# Patient Record
Sex: MALE | Race: WHITE | NOT HISPANIC OR LATINO | Employment: OTHER | URBAN - NONMETROPOLITAN AREA
[De-identification: names, ages, dates, MRNs, and addresses within clinical notes are randomized per-mention and may not be internally consistent; named-entity substitution may affect disease eponyms.]

---

## 2018-01-11 ENCOUNTER — OFFICE VISIT (OUTPATIENT)
Dept: CARDIOLOGY | Facility: CLINIC | Age: 53
End: 2018-01-11

## 2018-01-11 VITALS
OXYGEN SATURATION: 97 % | SYSTOLIC BLOOD PRESSURE: 110 MMHG | HEART RATE: 66 BPM | BODY MASS INDEX: 30.71 KG/M2 | WEIGHT: 247 LBS | DIASTOLIC BLOOD PRESSURE: 75 MMHG | HEIGHT: 75 IN

## 2018-01-11 DIAGNOSIS — R06.02 SHORTNESS OF BREATH: ICD-10-CM

## 2018-01-11 DIAGNOSIS — R07.9 CHEST PAIN, UNSPECIFIED TYPE: ICD-10-CM

## 2018-01-11 DIAGNOSIS — I42.9 CARDIOMYOPATHY, UNSPECIFIED TYPE (HCC): Primary | ICD-10-CM

## 2018-01-11 PROCEDURE — 93289 INTERROG DEVICE EVAL HEART: CPT | Performed by: INTERNAL MEDICINE

## 2018-01-11 PROCEDURE — 99204 OFFICE O/P NEW MOD 45 MIN: CPT | Performed by: PHYSICIAN ASSISTANT

## 2018-01-11 RX ORDER — PANTOPRAZOLE SODIUM 40 MG/1
40 TABLET, DELAYED RELEASE ORAL DAILY
COMMUNITY
Start: 2017-12-03 | End: 2018-06-27 | Stop reason: SDUPTHER

## 2018-01-11 RX ORDER — TRAZODONE HYDROCHLORIDE 100 MG/1
100 TABLET ORAL NIGHTLY
COMMUNITY

## 2018-01-11 RX ORDER — SIMVASTATIN 20 MG
20 TABLET ORAL NIGHTLY
COMMUNITY
Start: 2018-01-07 | End: 2019-01-28 | Stop reason: SDUPTHER

## 2018-01-11 RX ORDER — FLUOXETINE HYDROCHLORIDE 20 MG/1
20 CAPSULE ORAL DAILY
COMMUNITY
End: 2018-02-19 | Stop reason: DRUGHIGH

## 2018-01-11 RX ORDER — DICYCLOMINE HYDROCHLORIDE 10 MG/1
10 CAPSULE ORAL DAILY
COMMUNITY
Start: 2018-01-09 | End: 2018-06-27 | Stop reason: SDUPTHER

## 2018-01-11 RX ORDER — FUROSEMIDE 20 MG/1
20 TABLET ORAL DAILY
COMMUNITY
End: 2018-02-21 | Stop reason: DRUGHIGH

## 2018-01-11 RX ORDER — ALBUTEROL SULFATE 90 UG/1
2 AEROSOL, METERED RESPIRATORY (INHALATION) EVERY 4 HOURS PRN
COMMUNITY
End: 2018-06-27

## 2018-01-11 RX ORDER — RAMIPRIL 5 MG/1
5 CAPSULE ORAL DAILY
COMMUNITY
End: 2018-06-27

## 2018-01-11 RX ORDER — WARFARIN SODIUM 5 MG/1
5 TABLET ORAL
COMMUNITY
Start: 2017-11-06 | End: 2018-11-19 | Stop reason: SDUPTHER

## 2018-01-11 RX ORDER — NITROGLYCERIN 0.4 MG/1
TABLET SUBLINGUAL
Qty: 100 TABLET | Refills: 11 | Status: SHIPPED | OUTPATIENT
Start: 2018-01-11

## 2018-01-11 RX ORDER — BUSPIRONE HYDROCHLORIDE 10 MG/1
10 TABLET ORAL 2 TIMES DAILY
COMMUNITY
End: 2021-01-22

## 2018-01-11 RX ORDER — CARVEDILOL 25 MG/1
25 TABLET ORAL 2 TIMES DAILY WITH MEALS
COMMUNITY
Start: 2018-01-03 | End: 2018-07-03 | Stop reason: DRUGHIGH

## 2018-01-11 RX ORDER — AMIODARONE HYDROCHLORIDE 200 MG/1
200 TABLET ORAL DAILY
Qty: 30 TABLET | Refills: 5 | Status: SHIPPED | OUTPATIENT
Start: 2018-01-11 | End: 2018-02-09 | Stop reason: SDUPTHER

## 2018-01-11 RX ORDER — AMIODARONE HYDROCHLORIDE 200 MG/1
200 TABLET ORAL 3 TIMES DAILY
Qty: 42 TABLET | Refills: 0 | Status: SHIPPED | OUTPATIENT
Start: 2018-01-11 | End: 2018-02-09

## 2018-01-11 NOTE — PROGRESS NOTES
Subjective   Bill Umana is a 52 y.o. male     Chief Complaint   Patient presents with   • Atrial Fibrillation     presents as a new patient   • Palpitations   • Shortness of Breath       HPI    Problem list  1.  History of nonischemic dilated cardiomyopathy status post AICD implant ([employer])   1.1 recent interrogation July 11, 2018 demonstrates 4 episodes of nonsustained ventricular tachycardia and one episode of ventricular tachycardia converted with ATP without defibrillation  2.  Chronic systolic heart failure  3.  Atrial fibrillation on Coumadin therapy  4.  Dyslipidemia  5.  GERD    Patient is a 52-year-old male that presents today to establish care.  Patient recently moved here from [place] to be with family.  He presents today to establish cardiac care.    Patient describes that he has been feeling episodes of chest pressure and left arm discomfort.  This is been ongoing and intermittent approximately 4 times a week per his report.  It is short-lived and resolves spontaneously but he has noticed this change.  He also has moderate levels of dyspnea at baseline but nothing that has been progressive.  No PND orthopnea.  He does sense palpitations.  He has had no dizziness presyncope or syncope.  Otherwise he voices no other complaints      Current Outpatient Prescriptions   Medication Sig Dispense Refill   • albuterol (PROVENTIL HFA;VENTOLIN HFA) 108 (90 Base) MCG/ACT inhaler Inhale 2 puffs Every 4 (Four) Hours As Needed for Wheezing.     • busPIRone (BUSPAR) 10 MG tablet Take 10 mg by mouth 2 (Two) Times a Day.     • carvedilol (COREG) 25 MG tablet Take 25 mg by mouth Daily.     • dicyclomine (BENTYL) 10 MG capsule Take 10 mg by mouth.     • FLUoxetine (PROzac) 20 MG capsule Take 20 mg by mouth Daily.     • FLUOXETINE HCL PO Take 40 mg by mouth Daily.     • Fluticasone Furoate-Vilanterol (BREO ELLIPTA IN) Inhale.     • furosemide (LASIX) 20 MG tablet Take 20 mg by mouth Daily.     • pantoprazole  (PROTONIX) 40 MG EC tablet      • ramipril (ALTACE) 5 MG capsule Take 5 mg by mouth Daily.     • simvastatin (ZOCOR) 20 MG tablet      • traZODone (DESYREL) 100 MG tablet Take 100 mg by mouth 3 (Three) Times a Day.     • warfarin (COUMADIN) 5 MG tablet      • amiodarone (PACERONE) 200 MG tablet Take 1 tablet by mouth 3 (Three) Times a Day. 42 tablet 0   • amiodarone (PACERONE) 200 MG tablet Take 1 tablet by mouth Daily. 30 tablet 5   • nitroglycerin (NITROSTAT) 0.4 MG SL tablet 1 under the tongue as needed for angina, may repeat q5mins for up three doses 100 tablet 11     No current facility-administered medications for this visit.        Review of patient's allergies indicates no known allergies.    Past Medical History:   Diagnosis Date   • Atrial fibrillation    • Hypertension    • ICD (implantable cardioverter-defibrillator) in place        Social History     Social History   • Marital status:      Spouse name: N/A   • Number of children: N/A   • Years of education: N/A     Occupational History   • Not on file.     Social History Main Topics   • Smoking status: Never Smoker   • Smokeless tobacco: Never Used   • Alcohol use No   • Drug use: No   • Sexual activity: Defer     Other Topics Concern   • Not on file     Social History Narrative   • No narrative on file         History reviewed. No pertinent family history.    Review of Systems   Constitutional: Positive for fatigue.   HENT: Negative for sinus pressure.    Eyes: Positive for visual disturbance ( wears glasses).   Respiratory: Positive for shortness of breath.    Cardiovascular: Positive for chest pain, palpitations and leg swelling (ankle swelling).   Gastrointestinal: Negative for constipation and diarrhea.   Endocrine: Negative.    Genitourinary: Negative for difficulty urinating.   Musculoskeletal: Positive for myalgias (RLS and toes turning colors ).   Skin: Negative.    Allergic/Immunologic: Negative.  Negative for environmental allergies.  "  Neurological: Positive for headaches.   Hematological: Bruises/bleeds easily.   Psychiatric/Behavioral: The patient is nervous/anxious.        Objective   Vitals:    01/11/18 1251   BP: 110/75   BP Location: Left arm   Patient Position: Sitting   Pulse: 66   SpO2: 97%   Weight: 112 kg (247 lb)   Height: 190.5 cm (75\")      /75 (BP Location: Left arm, Patient Position: Sitting)  Pulse 66  Ht 190.5 cm (75\")  Wt 112 kg (247 lb)  SpO2 97%  BMI 30.87 kg/m2    Lab Results (most recent)     None          Physical Exam   Constitutional: He is oriented to person, place, and time. He appears well-developed and well-nourished. No distress.   HENT:   Head: Normocephalic and atraumatic.   Eyes: EOM are normal. Pupils are equal, round, and reactive to light.   Neck: No JVD present.   Cardiovascular: Normal rate, regular rhythm and normal heart sounds.  Exam reveals no gallop and no friction rub.    No murmur heard.  Pulmonary/Chest: Effort normal and breath sounds normal. No respiratory distress. He has no wheezes. He has no rales. He exhibits no tenderness.   Musculoskeletal: Normal range of motion. He exhibits no edema.   Neurological: He is alert and oriented to person, place, and time. No cranial nerve deficit.   Skin: Skin is warm and dry. No rash noted. No erythema. No pallor.   Psychiatric: He has a normal mood and affect. His behavior is normal.   Nursing note and vitals reviewed.      Procedure   Procedures         Assessment/Plan     Problems Addressed this Visit        Cardiovascular and Mediastinum    Cardiomyopathy - Primary    Relevant Medications    carvedilol (COREG) 25 MG tablet    nitroglycerin (NITROSTAT) 0.4 MG SL tablet    Other Relevant Orders    Stress Test With Myocardial Perfusion One Day    Adult Transthoracic Echo Complete W/ Cont if Necessary Per Protocol       Respiratory    Shortness of breath    Relevant Orders    Stress Test With Myocardial Perfusion One Day    Adult Transthoracic Echo " Complete W/ Cont if Necessary Per Protocol       Nervous and Auditory    Chest pain    Relevant Orders    Stress Test With Myocardial Perfusion One Day    Adult Transthoracic Echo Complete W/ Cont if Necessary Per Protocol                Recommendation  1.  Patient with chest discomfort and history of nonobstructive disease.  Would like to risk stratify at this time we'll schedule for Lexiscan stress test.  2.  Would like to obtain an echocardiogram as well because of ventricular tachycardia with chest pain and dyspnea.  We would like to reevaluate LV function.  3.  Because of patient's several runs of ventricular tachycardia, would like to start on amiodarone loading dose for 2 weeks and then 200 mg daily.  4.  We will see him back for follow-up after above testing.  Any chest pain not resolved by nitroglycerin, he will go to the ER.  He will follow-up with primary as scheduled.  Follow-up with our office as scheduled         Electronically signed by:

## 2018-01-30 ENCOUNTER — TELEPHONE (OUTPATIENT)
Dept: CARDIOLOGY | Facility: CLINIC | Age: 53
End: 2018-01-30

## 2018-01-30 ENCOUNTER — CLINICAL SUPPORT (OUTPATIENT)
Dept: CARDIOLOGY | Facility: CLINIC | Age: 53
End: 2018-01-30

## 2018-01-30 DIAGNOSIS — I42.9 CARDIOMYOPATHY, UNSPECIFIED TYPE (HCC): Primary | ICD-10-CM

## 2018-01-30 DIAGNOSIS — I47.29 NSVT (NONSUSTAINED VENTRICULAR TACHYCARDIA) (HCC): Primary | ICD-10-CM

## 2018-01-30 DIAGNOSIS — I47.29 NSVT (NONSUSTAINED VENTRICULAR TACHYCARDIA) (HCC): ICD-10-CM

## 2018-01-30 DIAGNOSIS — R00.0 TACHYCARDIA: Primary | ICD-10-CM

## 2018-01-30 PROCEDURE — 93289 INTERROG DEVICE EVAL HEART: CPT | Performed by: INTERNAL MEDICINE

## 2018-01-30 RX ORDER — DIGOXIN 125 MCG
125 TABLET ORAL DAILY
Qty: 30 TABLET | Refills: 5 | Status: SHIPPED | OUTPATIENT
Start: 2018-01-30 | End: 2018-03-05

## 2018-01-30 NOTE — TELEPHONE ENCOUNTER
PATIENT STATES DESPITE BEING ON THE AMIODARONE, WITH ACTIVITY PALPS ARE WORSE AND STILL HAS AT REST, AND HIS FINGER TIPS BECOME NUMB. VERBAL ORDER PER POLLO DURAN, PAC REFER TO [facility] DX: NSVT PER DEFIB. INTERROGATION, HAVE DEVICE INTERROGATED AGAIN, AND CONTINUE AMIODARONE AS PRESECRIBED. PATIENT FINE WITH HERE OR AIME. APPT. WHICHEVER IS SOONER. RIGOBERTO WITH [facility] COMING TODAY AT 2:15 TO INTERROGATE DEVICE, PATIENT AWARE AND TO CONTINUE AMIOD. PH,LPN

## 2018-01-30 NOTE — TELEPHONE ENCOUNTER
PATIENT HERE FOR DEFIB. INTERROGATION, CHECKED BY RIGOBERTO WITH [employer]. INTERROGATION DISCUSSED BY HER WITH POLLO DURAN, PAC. VERBAL ORDER PER HIM TO SEND IN DIGOXIN 0.125 MG PO DAILY # 30 WITH 5 REFILLS IN TO PHARMACY AND HAVE PATIENT CALL THE OFFICE IN 1 WEEK WITH SX CHECK AND V/S'S. PATIENT AWARE. PH,LPN

## 2018-01-30 NOTE — TELEPHONE ENCOUNTER
----- Message from Jewels Canales LPN sent at 1/30/2018 11:02 AM EST -----   Patient states he was recently started on a new medication for palpitations. He states he is still having these and would like to speak with a nurse.

## 2018-02-05 ENCOUNTER — HOSPITAL ENCOUNTER (OUTPATIENT)
Dept: CARDIOLOGY | Facility: HOSPITAL | Age: 53
Discharge: HOME | End: 2018-02-05

## 2018-02-05 ENCOUNTER — APPOINTMENT (OUTPATIENT)
Dept: CARDIOLOGY | Facility: HOSPITAL | Age: 53
End: 2018-02-05

## 2018-02-05 ENCOUNTER — OUTSIDE FACILITY SERVICE (OUTPATIENT)
Dept: CARDIOLOGY | Facility: CLINIC | Age: 53
End: 2018-02-05

## 2018-02-05 LAB
MAXIMAL PREDICTED HEART RATE: 167 BPM
MAXIMAL PREDICTED HEART RATE: 167 BPM
STRESS TARGET HR: 142 BPM
STRESS TARGET HR: 142 BPM

## 2018-02-05 PROCEDURE — 78452 HT MUSCLE IMAGE SPECT MULT: CPT

## 2018-02-05 PROCEDURE — A9500 TC99M SESTAMIBI: HCPCS | Performed by: INTERNAL MEDICINE

## 2018-02-05 PROCEDURE — 93017 CV STRESS TEST TRACING ONLY: CPT

## 2018-02-05 PROCEDURE — 25010000002 REGADENOSON 0.4 MG/5ML SOLUTION: Performed by: INTERNAL MEDICINE

## 2018-02-05 PROCEDURE — 93306 TTE W/DOPPLER COMPLETE: CPT

## 2018-02-05 PROCEDURE — 0 TECHNETIUM SESTAMIBI: Performed by: INTERNAL MEDICINE

## 2018-02-05 RX ADMIN — TECHNETIUM TC 99M SESTAMIBI 1 DOSE: 1 INJECTION INTRAVENOUS at 08:30

## 2018-02-05 RX ADMIN — REGADENOSON 0.4 MG: 0.08 INJECTION, SOLUTION INTRAVENOUS at 08:30

## 2018-02-06 PROCEDURE — 78452 HT MUSCLE IMAGE SPECT MULT: CPT | Performed by: INTERNAL MEDICINE

## 2018-02-06 PROCEDURE — 93306 TTE W/DOPPLER COMPLETE: CPT | Performed by: INTERNAL MEDICINE

## 2018-02-06 PROCEDURE — 93018 CV STRESS TEST I&R ONLY: CPT | Performed by: INTERNAL MEDICINE

## 2018-02-07 ENCOUNTER — DOCUMENTATION (OUTPATIENT)
Dept: CARDIOLOGY | Facility: CLINIC | Age: 53
End: 2018-02-07

## 2018-02-08 PROCEDURE — 78802 RP LOCLZJ TUM WHBDY 1 D IMG: CPT

## 2018-02-09 DIAGNOSIS — I48.91 ATRIAL FIBRILLATION, UNSPECIFIED TYPE (HCC): Primary | ICD-10-CM

## 2018-02-09 RX ORDER — AMIODARONE HYDROCHLORIDE 200 MG/1
200 TABLET ORAL DAILY
Qty: 90 TABLET | Refills: 3 | OUTPATIENT
Start: 2018-02-09 | End: 2018-03-23 | Stop reason: ALTCHOICE

## 2018-02-19 ENCOUNTER — OFFICE VISIT (OUTPATIENT)
Dept: CARDIOLOGY | Facility: CLINIC | Age: 53
End: 2018-02-19

## 2018-02-19 VITALS
DIASTOLIC BLOOD PRESSURE: 79 MMHG | OXYGEN SATURATION: 98 % | SYSTOLIC BLOOD PRESSURE: 117 MMHG | HEART RATE: 67 BPM | HEIGHT: 75 IN | BODY MASS INDEX: 30.59 KG/M2 | WEIGHT: 246 LBS

## 2018-02-19 DIAGNOSIS — R07.9 CHEST PAIN, UNSPECIFIED TYPE: ICD-10-CM

## 2018-02-19 DIAGNOSIS — R06.02 SHORTNESS OF BREATH: Primary | ICD-10-CM

## 2018-02-19 DIAGNOSIS — I42.0 DILATED CARDIOMYOPATHY (HCC): ICD-10-CM

## 2018-02-19 DIAGNOSIS — I50.20 SYSTOLIC CONGESTIVE HEART FAILURE, UNSPECIFIED CONGESTIVE HEART FAILURE CHRONICITY: ICD-10-CM

## 2018-02-19 PROCEDURE — 99214 OFFICE O/P EST MOD 30 MIN: CPT | Performed by: PHYSICIAN ASSISTANT

## 2018-02-19 RX ORDER — METHOCARBAMOL 750 MG/1
750 TABLET, FILM COATED ORAL 3 TIMES DAILY
Refills: 1 | COMMUNITY
Start: 2018-01-09 | End: 2022-10-24

## 2018-02-19 RX ORDER — IBUPROFEN 800 MG/1
800 TABLET ORAL EVERY 6 HOURS PRN
COMMUNITY
Start: 2018-02-01

## 2018-02-19 RX ORDER — GABAPENTIN 300 MG/1
CAPSULE ORAL
Refills: 2 | COMMUNITY
Start: 2018-01-29

## 2018-02-19 NOTE — PROGRESS NOTES
Problem list     Subjective   Bill Umana is a 52 y.o. male     Chief Complaint   Patient presents with   • Cardiomyopathy     Here for 2-3 week testing f/u   • Atrial Fibrillation   • Hypertension       HPI    Problem list  1.  History of nonischemic dilated cardiomyopathy status post AICD implant ([employer])   1.1 recent interrogation demonstrates 4 episodes of nonsustained ventricular tachycardia and one episode of ventricular tachycardia converted with ATP without defibrillation  1.2 patient on amiodarone with no evidence of ventricular tachycardia  2.  Chronic systolic heart failure  2.1 EF estimated at 35-40% by echocardiogram February 2018  2.2 EF estimated in the low 40s post stress December 2018  3.  Atrial fibrillation on Coumadin therapy  4.  Dyslipidemia  5.  GERD  6.  Mild mitral and tricuspid regurgitation    Patient is a 52-year-old male that presents back for follow-up.  He is here today to review stress test and echocardiogram findings.  Stress test did not demonstrate any evidence of ischemia.  There was questionable inferior infarct but not confirmed.  Post stress EF in the low 40s.  Echocardiogram demonstrated an EF of 35-40% with grade 2 diastolic dysfunction, mild-to-moderate TR, mild MR, and poorly pressures 35-40 mmHg.    Patient feels more shortness of breath since the last office visit.  He describes that he has had a chronic two-pillow orthopnea.  He has increase that to 3 pillows.  He has noticed lower extremity edema.  He has not noted a weight gain.  However he notices more shortness of breath when trying to exert.  He has felt sharp discomfort in his chest that is short-lived and resolving spontaneously.  It is not associated with nausea or diaphoresis or referral of pain.  He does sense palpitations.  No dizziness presyncope or syncope.  He has mild to moderate levels of fatigue but otherwise is feeling well        Outpatient Encounter Prescriptions as of 2/19/2018    Medication Sig Dispense Refill   • albuterol (PROVENTIL HFA;VENTOLIN HFA) 108 (90 Base) MCG/ACT inhaler Inhale 2 puffs Every 4 (Four) Hours As Needed for Wheezing.     • amiodarone (PACERONE) 200 MG tablet Take 1 tablet by mouth Daily. 90 tablet 3   • busPIRone (BUSPAR) 10 MG tablet Take 10 mg by mouth 2 (Two) Times a Day.     • carvedilol (COREG) 25 MG tablet Take 25 mg by mouth Daily.     • dicyclomine (BENTYL) 10 MG capsule Take 10 mg by mouth.     • digoxin (LANOXIN) 125 MCG tablet Take 1 tablet by mouth Daily. 30 tablet 5   • FLUOXETINE HCL PO Take 40 mg by mouth Daily.     • Fluticasone Furoate-Vilanterol (BREO ELLIPTA IN) Inhale.     • furosemide (LASIX) 20 MG tablet Take 20 mg by mouth Daily.     • gabapentin (NEURONTIN) 300 MG capsule 2 (Two) Times a Day.  2   • ibuprofen (ADVIL,MOTRIN) 800 MG tablet prn     • methocarbamol (ROBAXIN) 750 MG tablet 3 (Three) Times a Day.  1   • pantoprazole (PROTONIX) 40 MG EC tablet Take 40 mg by mouth Daily.     • ramipril (ALTACE) 5 MG capsule Take 5 mg by mouth Daily.     • simvastatin (ZOCOR) 20 MG tablet Take 20 mg by mouth Every Night.     • traZODone (DESYREL) 100 MG tablet Take 100 mg by mouth 3 (Three) Times a Day.     • warfarin (COUMADIN) 5 MG tablet Take 5 mg by mouth Daily.     • nitroglycerin (NITROSTAT) 0.4 MG SL tablet 1 under the tongue as needed for angina, may repeat q5mins for up three doses 100 tablet 11   • [DISCONTINUED] FLUoxetine (PROzac) 20 MG capsule Take 20 mg by mouth Daily.       No facility-administered encounter medications on file as of 2/19/2018.        Review of patient's allergies indicates no known allergies.    Past Medical History:   Diagnosis Date   • Atrial fibrillation    • Hypertension    • ICD (implantable cardioverter-defibrillator) in place        Social History     Social History   • Marital status:      Spouse name: N/A   • Number of children: N/A   • Years of education: N/A     Occupational History   • Not on file.  "    Social History Main Topics   • Smoking status: Never Smoker   • Smokeless tobacco: Never Used   • Alcohol use No   • Drug use: No   • Sexual activity: Defer     Other Topics Concern   • Not on file     Social History Narrative       History reviewed. No pertinent family history.    Review of Systems   Constitutional: Positive for fatigue.   HENT:        Scratchy throat   Eyes: Positive for visual disturbance (glasses).   Respiratory: Positive for shortness of breath.    Cardiovascular: Positive for chest pain, palpitations and leg swelling.   Gastrointestinal: Negative.    Endocrine: Negative.    Genitourinary: Negative.    Musculoskeletal: Negative.    Skin: Negative.    Allergic/Immunologic: Negative.    Neurological: Positive for light-headedness.   Hematological: Bruises/bleeds easily (bleed).   Psychiatric/Behavioral: Negative.        Objective   Vitals:    02/19/18 0840   BP: 117/79   BP Location: Left arm   Patient Position: Sitting   Pulse: 67   SpO2: 98%   Weight: 112 kg (246 lb)   Height: 190.5 cm (75\")      /79 (BP Location: Left arm, Patient Position: Sitting)  Pulse 67  Ht 190.5 cm (75\")  Wt 112 kg (246 lb)  SpO2 98%  BMI 30.75 kg/m2    Lab Results (most recent)     None          Physical Exam   Constitutional: He is oriented to person, place, and time. He appears well-developed and well-nourished. No distress.   HENT:   Head: Normocephalic and atraumatic.   Eyes: EOM are normal. Pupils are equal, round, and reactive to light.   Neck: No JVD present.   Cardiovascular: Normal rate, regular rhythm and normal heart sounds.  Exam reveals no gallop and no friction rub.    No murmur heard.  Pulmonary/Chest: Effort normal and breath sounds normal. No respiratory distress. He has no wheezes. He has no rales. He exhibits no tenderness.   Musculoskeletal: Normal range of motion. He exhibits no edema.   Neurological: He is alert and oriented to person, place, and time. No cranial nerve deficit. "   Skin: Skin is warm and dry. No rash noted. No erythema. No pallor.   Psychiatric: He has a normal mood and affect. His behavior is normal.   Nursing note and vitals reviewed.      Procedure   Procedures       Assessment/Plan     Problems Addressed this Visit        Cardiovascular and Mediastinum    Systolic congestive heart failure    Dilated cardiomyopathy       Respiratory    Shortness of breath - Primary       Nervous and Auditory    Chest pain              Recommendation  1.  I feel patient has mildly decompensated congestive heart failure based on patient's history and physical exam findings.  Stress test did not demonstrate any evidence of ischemia.  EF estimated in the low 40s based on stress test and echo cardiac exam findings.  I would like to adjust medications to hopefully improve the patient's symptoms.  I would like to stop ramipril and replace with entresto which would help in regards to patient's energy level but also with diuresis.  I would like to see him back in one to 2 weeks..  He is on maximum dose of carvedilol and at this time I would like to stop digoxin.  He does not have severe cardiomyopathy at this time like to stop at this time.  2.  I feel his chest discomfort shortness of breath and symptoms are likely from the LAD compensated heart failure with no evidence of ischemia on stress testing.  I would like to see him back in one to 2 weeks to reevaluate clinical course and adjust medications.  For any chest pain not resolved by nitroglycerin, he is to go to the ER.  Follow-up primary as scheduled       Electronically signed by:

## 2018-02-21 ENCOUNTER — TELEPHONE (OUTPATIENT)
Dept: CARDIOLOGY | Facility: CLINIC | Age: 53
End: 2018-02-21

## 2018-02-21 DIAGNOSIS — I42.9 CARDIOMYOPATHY, UNSPECIFIED TYPE (HCC): ICD-10-CM

## 2018-02-21 DIAGNOSIS — R07.2 PRECORDIAL PAIN: Primary | ICD-10-CM

## 2018-02-21 DIAGNOSIS — R06.02 SHORTNESS OF BREATH: ICD-10-CM

## 2018-02-21 DIAGNOSIS — R60.9 EDEMA, UNSPECIFIED TYPE: ICD-10-CM

## 2018-02-21 DIAGNOSIS — R94.39 ABNORMAL STRESS TEST: ICD-10-CM

## 2018-02-21 RX ORDER — RANOLAZINE 500 MG/1
500 TABLET, EXTENDED RELEASE ORAL EVERY 12 HOURS SCHEDULED
Qty: 60 TABLET | Refills: 5 | Status: SHIPPED | OUTPATIENT
Start: 2018-02-21 | End: 2018-03-05

## 2018-02-21 RX ORDER — POTASSIUM CHLORIDE 750 MG/1
10 TABLET, FILM COATED, EXTENDED RELEASE ORAL DAILY
Qty: 30 TABLET | Refills: 5 | Status: SHIPPED | OUTPATIENT
Start: 2018-02-21 | End: 2018-09-04 | Stop reason: SDUPTHER

## 2018-02-21 RX ORDER — FUROSEMIDE 40 MG/1
40 TABLET ORAL DAILY
Qty: 30 TABLET | Refills: 5 | Status: SHIPPED | OUTPATIENT
Start: 2018-02-21 | End: 2018-07-30 | Stop reason: SDUPTHER

## 2018-02-21 NOTE — TELEPHONE ENCOUNTER
----- Message from Jewels Canales LPN sent at 2/21/2018  2:08 PM EST -----   Patient states he was instructed to call back and speak with Devaugh[facility]'s nurse with a follow up on how he has been doing.

## 2018-02-21 NOTE — TELEPHONE ENCOUNTER
CALLED PATIENT BACK AND HE IS STILL LIGHTHEADED, DIAPHORETIC ALL THE TIME, CENTER CHEST PRESSURE/DULL PAIN AT REST AND GETS A LOT WORSE WITH ACTIVITY WITH SHARP PAINS THEN THAT COMES AND GOES. DOES HAVE SHORTNESS OF BREATH, PAIN IN MID BACK TO LEFT SIDE, MILD LE EDEMA BY EVENING BUT GOES AWAY BY MORNING. HE DOES HAVE SL NTG. AT HOME. B/P 120/83 H/R 71 AT FAMILY PCP THIS AM. POLLO DURAN, PAC AWARE OF ABOVE, FOLLOWING TELEPHONE ORDERS RECEIVED: START RANEXA 500 MG PO BID # 60 WITH 5 REFILLS, INCREASE LASIX TO 40 MG PO DAILY, START K+ 10 MEQ 1 PO DAILY, BMP IN 1 WEEK, SCHEDULE FOR [facility] DX: CARDIOMYOPATHY, C/P, SOB, ABNL. STRESS TEST, TO GO TO ER FOR WORSENING C/P ETC., CALL OFFICE NEXT WEEK WITH SX CHECK, AND KEEP F/U APPT. ON 3-5-18. ALL NEW ORDERS ABOVE DISCUSSED IN DETAIL WITH PATIENT AND HE VERBALIZED HE UNDERSTOOD. HE REQUESTED 1 WEEK LAB ORDER BE FAXED TO [facility]. ORDER FAXED. ABOVE MEDS CHANGES/ADDITION'S ELECTRO'D IN TO [facility]. PH,LPN

## 2018-03-05 ENCOUNTER — OFFICE VISIT (OUTPATIENT)
Dept: CARDIOLOGY | Facility: CLINIC | Age: 53
End: 2018-03-05

## 2018-03-05 ENCOUNTER — APPOINTMENT (OUTPATIENT)
Dept: LAB | Facility: HOSPITAL | Age: 53
End: 2018-03-05

## 2018-03-05 VITALS
BODY MASS INDEX: 29.89 KG/M2 | DIASTOLIC BLOOD PRESSURE: 80 MMHG | HEART RATE: 63 BPM | SYSTOLIC BLOOD PRESSURE: 122 MMHG | WEIGHT: 240.4 LBS | OXYGEN SATURATION: 97 % | HEIGHT: 75 IN

## 2018-03-05 DIAGNOSIS — Z95.810 AICD (AUTOMATIC CARDIOVERTER/DEFIBRILLATOR) PRESENT: ICD-10-CM

## 2018-03-05 DIAGNOSIS — I48.0 PAROXYSMAL ATRIAL FIBRILLATION (HCC): ICD-10-CM

## 2018-03-05 DIAGNOSIS — E78.5 HYPERLIPIDEMIA, UNSPECIFIED HYPERLIPIDEMIA TYPE: ICD-10-CM

## 2018-03-05 DIAGNOSIS — Z00.00 HEALTHCARE MAINTENANCE: ICD-10-CM

## 2018-03-05 DIAGNOSIS — I42.0 DILATED CARDIOMYOPATHY (HCC): ICD-10-CM

## 2018-03-05 DIAGNOSIS — I10 ESSENTIAL HYPERTENSION: ICD-10-CM

## 2018-03-05 DIAGNOSIS — R06.02 SHORTNESS OF BREATH: ICD-10-CM

## 2018-03-05 DIAGNOSIS — R94.39 ABNORMAL STRESS TEST: ICD-10-CM

## 2018-03-05 DIAGNOSIS — R07.9 CHEST PAIN, UNSPECIFIED TYPE: Primary | ICD-10-CM

## 2018-03-05 PROCEDURE — 93000 ELECTROCARDIOGRAM COMPLETE: CPT | Performed by: NURSE PRACTITIONER

## 2018-03-05 PROCEDURE — 99214 OFFICE O/P EST MOD 30 MIN: CPT | Performed by: NURSE PRACTITIONER

## 2018-03-05 PROCEDURE — 80048 BASIC METABOLIC PNL TOTAL CA: CPT | Performed by: NURSE PRACTITIONER

## 2018-03-05 PROCEDURE — 83735 ASSAY OF MAGNESIUM: CPT | Performed by: NURSE PRACTITIONER

## 2018-03-05 NOTE — PATIENT INSTRUCTIONS
MyPlate from [employer]  The general, healthful diet is based on the 2010 Dietary Guidelines for Americans. The amount of food you need to eat from each food group depends on your age, sex, and level of physical activity and can be individualized by a dietitian. Go to ChooseMyPlate.gov for more information.  What do I need to know about the MyPlate plan?  · Enjoy your food, but eat less.  · Avoid oversized portions.  ¨ ½ of your plate should include fruits and vegetables.  ¨ ¼ of your plate should be grains.  ¨ ¼ of your plate should be protein.  Grains   · Make at least half of your grains whole grains.  · For a 2,000 calorie daily food plan, eat 6 oz every day.  · 1 oz is about 1 slice bread, 1 cup cereal, or ½ cup cooked rice, cereal, or pasta.  Vegetables   · Make half your plate fruits and vegetables.  · For a 2,000 calorie daily food plan, eat 2½ cups every day.  · 1 cup is about 1 cup raw or cooked vegetables or vegetable juice or 2 cups raw leafy greens.  Fruits   · Make half your plate fruits and vegetables.  · For a 2,000 calorie daily food plan, eat 2 cups every day.  · 1 cup is about 1 cup fruit or 100% fruit juice or ½ cup dried fruit.  Protein   · For a 2,000 calorie daily food plan, eat 5½ oz every day.  · 1 oz is about 1 oz meat, poultry, or fish, ¼ cup cooked beans, 1 egg, 1 Tbsp peanut butter, or ½ oz nuts or seeds.  Dairy   · Switch to fat-free or low-fat (1%) milk.  · For a 2,000 calorie daily food plan, eat 3 cups every day.  · 1 cup is about 1 cup milk or yogurt or soy milk (soy beverage), 1½ oz natural cheese, or 2 oz processed cheese.  Fats, Oils, and Empty Calories   · Only small amounts of oils are recommended.  · Empty calories are calories from solid fats or added sugars.  · Compare sodium in foods like soup, bread, and frozen meals. Choose the foods with lower numbers.  · Drink water instead of sugary drinks.  What foods can I eat?  Grains   Whole grains such as whole wheat, quinoa, millet,  and bulgur. Bread, rolls, and pasta made from whole grains. Brown or wild rice. Hot or cold cereals made from whole grains and without added sugar.  Vegetables   All fresh vegetables, especially fresh red, dark green, or orange vegetables. Peas and beans. Low-sodium frozen or canned vegetables prepared without added salt. Low-sodium vegetable juices.  Fruits   All fresh, frozen, and dried fruits. Canned fruit packed in water or fruit juice without added sugar. Fruit juices without added sugar.  Meats and Other Protein Sources   Boiled, baked, or grilled lean meat trimmed of fat. Skinless poultry. Fresh seafood and shellfish. Canned seafood packed in water. Unsalted nuts and unsalted nut butters. Tofu. Dried beans and pea. Eggs.  Dairy   Low-fat or fat-free milk, yogurt, and cheeses.  Sweets and Desserts   Frozen desserts made from low-fat milk.  Fats and Oils   Olive, peanut, and canola oils and margarine. Salad dressing and mayonnaise made from these oils.  Other   Soups and casseroles made from allowed ingredients and without added fat or salt.  The items listed above may not be a complete list of recommended foods or beverages. Contact your dietitian for more options.   What foods are not recommended?  Grains   Sweetened, low-fiber cereals. Packaged baked goods. Snack crackers and chips. Cheese crackers, butter crackers, and biscuits. Frozen waffles, sweet breads, doughnuts, pastries, packaged baking mixes, pancakes, cakes, and cookies.  Vegetables   Regular canned or frozen vegetables or vegetables prepared with salt. Canned tomatoes. Canned tomato sauce. Fried vegetables. Vegetables in cream sauce or cheese sauce.  Fruits   Fruits packed in syrup or made with added sugar.  Meats and Other Protein Sources   Marbled or fatty meats such as ribs. Poultry with skin. Fried meats, poultry, eggs, or fish. Sausages, hot dogs, and deli meats such as pastrami, bologna, or salami.  Dairy   Whole milk, cream, cheeses made  from whole milk, sour cream. Ice cream or yogurt made from whole milk or with added sugar.  Beverages   For adults, no more than one alcoholic drink per day. Regular soft drinks or other sugary beverages. Juice drinks.  Sweets and Desserts   Sugary or fatty desserts, candy, and other sweets.  Fats and Oils   Solid shortening or partially hydrogenated oils. Solid margarine. Margarine that contains trans fats. Butter.  The items listed above may not be a   Coronary Angiogram With Stent  Coronary angiogram with stent placement is a procedure to widen or open a narrow blood vessel of the heart (coronary artery). Arteries may become blocked by cholesterol buildup (plaques) in the lining or wall. When a coronary artery becomes partially blocked, blood flow to that area decreases. This may lead to chest pain or a heart attack (myocardial infarction).  A stent is a small piece of metal that looks like mesh or a spring. Stent placement may be done as treatment for a heart attack or right after a coronary angiogram in which a blocked artery is found.  Let your health care provider know about:  · Any allergies you have.  · All medicines you are taking, including vitamins, herbs, eye drops, creams, and over-the-counter medicines.  · Any problems you or family members have had with anesthetic medicines.  · Any blood disorders you have.  · Any surgeries you have had.  · Any medical conditions you have.  · Whether you are pregnant or may be pregnant.  What are the risks?  Generally, this is a safe procedure. However, problems may occur, including:  · Damage to the heart or its blood vessels.  · A return of blockage.  · Bleeding, infection, or bruising at the insertion site.  · A collection of blood under the skin (hematoma) at the insertion site.  · A blood clot in another part of the body.  · Kidney injury.  · Allergic reaction to the dye or contrast that is used.  · Bleeding into the abdomen (retroperitoneal bleeding).  What  happens before the procedure?  Staying hydrated   Follow instructions from your health care provider about hydration, which may include:  · Up to 2 hours before the procedure - you may continue to drink clear liquids, such as water, clear fruit juice, black coffee, and plain tea.  Eating and drinking restrictions   Follow instructions from your health care provider about eating and drinking, which may include:  · 8 hours before the procedure - stop eating heavy meals or foods such as meat, fried foods, or fatty foods.  · 6 hours before the procedure - stop eating light meals or foods, such as toast or cereal.  · 2 hours before the procedure - stop drinking clear liquids.  Ask your health care provider about:  · Changing or stopping your regular medicines. This is especially important if you are taking diabetes medicines or blood thinners.  · Taking medicines such as ibuprofen. These medicines can thin your blood. Do not take these medicines before your procedure if your health care provider instructs you not to. Generally, aspirin is recommended before a procedure of passing a small, thin tube (catheter) through a blood vessel and into the heart (cardiac catheterization).  What happens during the procedure?  · An IV tube will be inserted into one of your veins.  · You will be given one or more of the following:  ¨ A medicine to help you relax (sedative).  ¨ A medicine to numb the area where the catheter will be inserted into an artery (local anesthetic).  · To reduce your risk of infection:  ¨ Your health care team will wash or sanitize their hands.  ¨ Your skin will be washed with soap.  ¨ Hair may be removed from the area where the catheter will be inserted.  · Using a guide wire, the catheter will be inserted into an artery. The location may be in your groin, in your wrist, or in the fold of your arm (near your elbow).  · A type of X-ray (fluoroscopy) will be used to help guide the catheter to the opening of the  arteries in the heart.  · A dye will be injected into the catheter, and X-rays will be taken. The dye will help to show where any narrowing or blockages are located in the arteries.  · A tiny wire will be guided to the blocked spot, and a balloon will be inflated to make the artery wider.  · The stent will be expanded and will crush the plaques into the wall of the vessel. The stent will hold the area open and improve the blood flow. Most stents have a drug coating to reduce the risk of the stent narrowing over time.  · The artery may be made wider using a drill, laser, or other tools to remove plaques.  · When the blood flow is better, the catheter will be removed. The lining of the artery will grow over the stent, which stays where it was placed.  This procedure may vary among health care providers and hospitals.  What happens after the procedure?  · If the procedure is done through the leg, you will be kept in bed lying flat for about 6 hours. You will be instructed to not bend and not cross your legs.  · The insertion site will be checked frequently.  · The pulse in your foot or wrist will be checked frequently.  · You may have additional blood tests, X-rays, and a test that records the electrical activity of your heart (electrocardiogram, or ECG).  This information is not intended to replace advice given to you by your health care provider. Make sure you discuss any questions you have with your health care provider.  Document Released: 06/23/2004 Document Revised: 08/17/2017 Document Reviewed: 07/23/2017  [employer] Interactive Patient Education © 2017 [employer]  complete list of foods and beverages to avoid. Contact your dietitian for more information.   This information is not intended to replace advice given to you by your health care provider. Make sure you discuss any questions you have with your health care provider.  Document Released: 01/06/2009 Document Revised: 05/25/2017 Document Reviewed:  11/26/2014  [employer] Interactive Patient Education © 2017 [employer]

## 2018-03-05 NOTE — PROGRESS NOTES
Subjective   Bill Umana is a 52 y.o. male     Chief Complaint   Patient presents with   • Results     presents for testing follow up   • Chest Pain   • Shortness of Breath       HPI    Problem list    1.  History of nonischemic dilated cardiomyopathy status post AICD implant ([employer])   1.1 recent interrogation demonstrates 4 episodes of nonsustained ventricular tachycardia and one episode of ventricular tachycardia converted with ATP without defibrillation  1.2 patient on amiodarone with no evidence of ventricular tachycardia  2.  Chronic systolic heart failure  2.1 EF estimated at 35-40% by echocardiogram February 2018  2.2 EF estimated in the low 40s post stress December 2018  2.3 Echo 2/5/18-mild LVH, EF 35-40%, diastolic dysfunction 2, mild MR, mild-to-moderate TR, PA 35-40  3.  Atrial fibrillation on Coumadin therapy  4.  Dyslipidemia  5.  GERD  6.  Mild mitral and tricuspid regurgitation  7.  Chest pain  7.1 stress test 2/5/18-possible inferior infarct, no ischemia, mildly to moderately decreased global LVEF    Patient is a 52-year-old male who presents today for a follow-up.  Patient has left anterior burning and pain that radiates into his left arm that he notices mostly with activity.  He says he will take a nitroglycerin and just 1 and with time and will resolve.  He does get nauseated and short of breath when this occurs.  He says he has palpitations almost every day in the last about 5 minutes.  He says this is very common for him and has not changed.  He does get dizzy with position change but denies any presyncope or syncope.  He denies any orthopnea, PND or edema.  He says that for about the last week and a half he's had increased shortness of breath with activity.  Devaughn had patient call back to advise if he was having any symptoms and he was.  He was advised over the phone that we will proceed with left heart catheter he is here today to have that set up.  He denies any signs of  bleeding or cerebral ischemic events.    Current Outpatient Prescriptions   Medication Sig Dispense Refill   • albuterol (PROVENTIL HFA;VENTOLIN HFA) 108 (90 Base) MCG/ACT inhaler Inhale 2 puffs Every 4 (Four) Hours As Needed for Wheezing.     • amiodarone (PACERONE) 200 MG tablet Take 1 tablet by mouth Daily. 90 tablet 3   • busPIRone (BUSPAR) 10 MG tablet Take 10 mg by mouth 2 (Two) Times a Day.     • carvedilol (COREG) 25 MG tablet Take 25 mg by mouth Daily.     • dicyclomine (BENTYL) 10 MG capsule Take 10 mg by mouth.     • FLUOXETINE HCL PO Take 40 mg by mouth Daily.     • Fluticasone Furoate-Vilanterol (BREO ELLIPTA IN) Inhale.     • furosemide (LASIX) 40 MG tablet Take 1 tablet by mouth Daily. 30 tablet 5   • gabapentin (NEURONTIN) 300 MG capsule 2 (Two) Times a Day.  2   • ibuprofen (ADVIL,MOTRIN) 800 MG tablet prn     • methocarbamol (ROBAXIN) 750 MG tablet 3 (Three) Times a Day.  1   • nitroglycerin (NITROSTAT) 0.4 MG SL tablet 1 under the tongue as needed for angina, may repeat q5mins for up three doses 100 tablet 11   • pantoprazole (PROTONIX) 40 MG EC tablet Take 40 mg by mouth Daily.     • potassium chloride (K-DUR) 10 MEQ CR tablet Take 1 tablet by mouth Daily. 30 tablet 5   • ramipril (ALTACE) 5 MG capsule Take 5 mg by mouth Daily.     • simvastatin (ZOCOR) 20 MG tablet Take 20 mg by mouth Every Night.     • traZODone (DESYREL) 100 MG tablet Take 100 mg by mouth 3 (Three) Times a Day.     • warfarin (COUMADIN) 5 MG tablet Take 5 mg by mouth Daily. PCP follows patient     • aspirin 81 MG tablet Take 1 tablet by mouth Daily. 30 tablet 11     No current facility-administered medications for this visit.        ALLERGIES    Review of patient's allergies indicates no known allergies.    Past Medical History:   Diagnosis Date   • Atrial fibrillation    • Hypertension    • ICD (implantable cardioverter-defibrillator) in place        Social History     Social History   • Marital status:      Spouse  "name: N/A   • Number of children: N/A   • Years of education: N/A     Occupational History   • Not on file.     Social History Main Topics   • Smoking status: Never Smoker   • Smokeless tobacco: Never Used   • Alcohol use No   • Drug use: No   • Sexual activity: Defer     Other Topics Concern   • Not on file     Social History Narrative       Family History   Problem Relation Age of Onset   • No Known Problems Mother    • No Known Problems Father        Review of Systems   Constitutional: Positive for diaphoresis (constantly ) and fatigue.   HENT: Positive for rhinorrhea. Negative for sinus pressure and sneezing.    Eyes: Positive for visual disturbance ( wears glasses).   Respiratory: Positive for cough, shortness of breath (with CP and with activity worse for the past 1 1/2 weeks ) and wheezing. Negative for chest tightness.    Cardiovascular: Positive for chest pain (left anterior burning and pain that radiates into the left arm mostly notices with activity; took nitro; only 1) and palpitations (everyday, common, last about 5 min ). Negative for leg swelling.   Gastrointestinal: Positive for diarrhea and nausea (with CP ). Negative for constipation and vomiting.   Endocrine: Negative.    Genitourinary: Negative for difficulty urinating.   Musculoskeletal: Positive for back pain (upper back ), myalgias and neck pain. Negative for arthralgias.   Skin: Negative.    Allergic/Immunologic: Negative for environmental allergies.   Neurological: Positive for dizziness (with position change ) and headaches ( migraines). Negative for syncope and light-headedness.   Hematological: Does not bruise/bleed easily.   Psychiatric/Behavioral: The patient is nervous/anxious.        Objective   /80 (BP Location: Left arm, Patient Position: Sitting)  Pulse 63  Ht 190.5 cm (75\")  Wt 109 kg (240 lb 6.4 oz)  SpO2 97%  BMI 30.05 kg/m2  Vitals:    03/05/18 0837   BP: 122/80   BP Location: Left arm   Patient Position: Sitting " "  Pulse: 63   SpO2: 97%   Weight: 109 kg (240 lb 6.4 oz)   Height: 190.5 cm (75\")      Lab Results (most recent)     None        Physical Exam   Constitutional: He is oriented to person, place, and time. Vital signs are normal. He appears well-developed and well-nourished. He is active and cooperative.   HENT:   Head: Normocephalic.   Eyes: Lids are normal.   Wears glasses    Neck: Normal carotid pulses, no hepatojugular reflux and no JVD present. Carotid bruit is not present.   Cardiovascular: Normal rate, regular rhythm and normal heart sounds.    Pulses:       Radial pulses are 2+ on the right side, and 2+ on the left side.        Dorsalis pedis pulses are 2+ on the right side, and 2+ on the left side.        Posterior tibial pulses are 2+ on the right side, and 2+ on the left side.   No edema BLE.    Pulmonary/Chest: Effort normal and breath sounds normal.   Abdominal: Normal appearance and bowel sounds are normal.   Neurological: He is alert and oriented to person, place, and time.   Skin: Skin is warm, dry and intact.   Psychiatric: He has a normal mood and affect. His speech is normal and behavior is normal. Judgment and thought content normal. Cognition and memory are normal.       Procedure     ECG 12 Lead  Date/Time: 3/5/2018 1:27 PM  Performed by: ANNA SANCHEZ  Authorized by: ANNA SANCHEZ   Comparison: not compared with previous ECG   Rhythm: sinus bradycardia  Rate: bradycardic  BPM: 54  T wave flattening noted on lead: nonspecific changes   QRS axis: normal  Clinical impression: non-specific ECG                 Assessment/Plan      Diagnosis Plan   1. Chest pain, unspecified type  Cardiac catheterization    aspirin 81 MG tablet   2. Dilated cardiomyopathy  Cardiac catheterization   3. Shortness of breath  Cardiac catheterization   4. Paroxysmal atrial fibrillation  Cardiac catheterization    Magnesium    Magnesium   5. AICD (automatic cardioverter/defibrillator) present  Cardiac catheterization "   6. Hyperlipidemia, unspecified hyperlipidemia type  Cardiac catheterization   7. Essential hypertension  Cardiac catheterization    Basic Metabolic Panel    Magnesium    Basic Metabolic Panel    Magnesium   8. Abnormal stress test  Cardiac catheterization   9. Healthcare maintenance  Basic Metabolic Panel    Magnesium    Basic Metabolic Panel    Magnesium       Return for After testing.       chest pain/cardiomyopathy/shortness of breath/A. fib/hyperlipidemia/hypertension/abnormal stress test-patient will proceed with left heart catheter.  He will start aspirin 81 mg.  He will hold Coumadin for 4 days prior to heart catheter and resume afterwards.  He will get a BMP and magnesium prior to heart catheter.  He will use nitroglycerin when necessary for chest pain no resolution he will go to the ER.  Patient will continue his medication regimen otherwise.  Patient will follow-up after left heart catheter or sooner if any changes.    Needed consider overnight pulse ox in the future due to the fact he has elevated pulmonary pressures.    Patient's BMI is above normal parameters. Follow-up plan includes:  educational material.      Electronically signed by:

## 2018-03-06 LAB
ANION GAP SERPL CALCULATED.3IONS-SCNC: 4.7 MMOL/L (ref 3.6–11.2)
BUN BLD-MCNC: 12 MG/DL (ref 7–21)
BUN/CREAT SERPL: 11.4 (ref 7–25)
CALCIUM SPEC-SCNC: 9.2 MG/DL (ref 7.7–10)
CHLORIDE SERPL-SCNC: 106 MMOL/L (ref 99–112)
CO2 SERPL-SCNC: 28.3 MMOL/L (ref 24.3–31.9)
CREAT BLD-MCNC: 1.05 MG/DL (ref 0.43–1.29)
GFR SERPL CREATININE-BSD FRML MDRD: 74 ML/MIN/1.73
GLUCOSE BLD-MCNC: 103 MG/DL (ref 70–110)
MAGNESIUM SERPL-MCNC: 2.1 MG/DL (ref 1.7–2.6)
OSMOLALITY SERPL CALC.SUM OF ELEC: 277.5 MOSM/KG (ref 273–305)
POTASSIUM BLD-SCNC: 4.5 MMOL/L (ref 3.5–5.3)
SODIUM BLD-SCNC: 139 MMOL/L (ref 135–153)

## 2018-03-15 ENCOUNTER — OUTSIDE FACILITY SERVICE (OUTPATIENT)
Dept: CARDIOLOGY | Facility: CLINIC | Age: 53
End: 2018-03-15

## 2018-03-15 PROCEDURE — 93458 L HRT ARTERY/VENTRICLE ANGIO: CPT | Performed by: INTERNAL MEDICINE

## 2018-03-21 NOTE — PROGRESS NOTES
Electrophysiology Consult     Bill Umana  1965  [phone]      03/23/18    DATE OF ADMISSION: (Not on file)  [facility] CARDIOLOGY    Les Mann MD  [address] [facility] [address]    Chief Complaint   Patient presents with   • Rapid Heart Rate     Problem List:    1. Nonischemic Cardiomyopathy   A. ICD Implant ([employer]) - 2005 - [facility]   B. Generator change 2012   C. LHC 3/15/18: no angiographically significant epicardial coronary disease, EF 40-45%  2. Ventricular Tachycardia   A. Device interrogation demonstrates 4 episodes of nonsustained ventricular tachycardia and one episode of ventricular tachycardia converted with ATP without  Defibrillation 1/2018   B. Initiation on Amiodarone 1/11/18  2.  Chronic systolic heart failure   A. Echocardiogram 2/5/2018: EF 35-40%,  diastolic dysfunction 2, mild MR, mild-to-moderate TR, PA 35-40   B. Stress Test 2/2018: EF low 40s, possible inferior infarct, no ischemia  3.  Atrial fibrillation    A. CHADSVasc = 2  On coumadin since 2016  4.  Dyslipidemia  5. HTN  6.  GERD  7. 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 He was seen in January and noted to have NSVT by device interrogation. He was symptomatic with these episodes with palpitations. He was started on Amiodarone and had a stress test and echocardiogram at that time showing EF 35-40%. Since then, he has been experiencing some chest pain and ended up having a LHC on 3/15/18, which showed essentially normal coronary arteries  and EF 40-45%. Since being on Amiodarone, his episodes have decreased. He still has episodes of tachycardia a couple times per day, that lasts a couple of minutes. He states that it is worse with activity. He is a  for his [employer] and is fairly active. He also has an apparent history of atrial fibrillation and has been on coumadin since 2016. He denies tobacco, ETOH, excessive caffeine. He drinks 2 cups of coffee daily. He has recently located from [place] to [place] in the last year and a half.     No Known Allergies     Cannot display prior to admission medications because the patient has not been admitted in this contact.            Current Outpatient Prescriptions:   •  albuterol (PROVENTIL HFA;VENTOLIN HFA) 108 (90 Base) MCG/ACT inhaler, Inhale 2 puffs Every 4 (Four) Hours As Needed for Wheezing., Disp: , Rfl:   •  amiodarone (PACERONE) 200 MG tablet, Take 1 tablet by mouth Daily., Disp: 90 tablet, Rfl: 3  •  aspirin 81 MG tablet, Take 1 tablet by mouth Daily., Disp: 30 tablet, Rfl: 11  •  busPIRone (BUSPAR) 10 MG tablet, Take 10 mg by mouth 2 (Two) Times a Day., Disp: , Rfl:   •  carvedilol (COREG) 25 MG tablet, Take 25 mg by mouth 2 (Two) Times a Day With Meals., Disp: , Rfl:   •  dicyclomine (BENTYL) 10 MG capsule, Take 10 mg by mouth Daily., Disp: , Rfl:   •  FLUOXETINE HCL PO, Take 40 mg by mouth Daily., Disp: , Rfl:   •  Fluticasone Furoate-Vilanterol (BREO ELLIPTA IN), Inhale., Disp: , Rfl:   •  furosemide (LASIX) 40 MG tablet, Take 1 tablet by mouth Daily., Disp: 30 tablet, Rfl: 5  •  gabapentin (NEURONTIN) 300 MG capsule, 2 (Two) Times a Day., Disp: , Rfl: 2  •  ibuprofen (ADVIL,MOTRIN) 800 MG tablet, prn, Disp: , Rfl:   •  methocarbamol (ROBAXIN) 750 MG tablet, 3 (Three) Times a Day., Disp: , Rfl: 1  •  nitroglycerin (NITROSTAT) 0.4 MG SL tablet, 1 under the tongue as needed for angina, may repeat q5mins for up three doses, Disp: 100 tablet, Rfl: 11  •  pantoprazole (PROTONIX) 40 MG EC  "tablet, Take 40 mg by mouth Daily., Disp: , Rfl:   •  potassium chloride (K-DUR) 10 MEQ CR tablet, Take 1 tablet by mouth Daily., Disp: 30 tablet, Rfl: 5  •  ramipril (ALTACE) 5 MG capsule, Take 5 mg by mouth Daily., Disp: , Rfl:   •  simvastatin (ZOCOR) 20 MG tablet, Take 20 mg by mouth Every Night., Disp: , Rfl:   •  traZODone (DESYREL) 100 MG tablet, Take 100 mg by mouth 3 (Three) Times a Day., Disp: , Rfl:   •  warfarin (COUMADIN) 5 MG tablet, Take 5 mg by mouth Daily. PCP follows patient, Disp: , Rfl:     Social History     Social History   • Marital status:      Social History Main Topics   • Smoking status: Never Smoker   • Smokeless tobacco: Never Used   • Alcohol use No   • Drug use: No   • Sexual activity: Defer     Other Topics Concern   • Not on file       Family History   Problem Relation Age of Onset   • No Known Problems Mother    • No Known Problems Father    Mother: CABG x 3, valve replacement, stents.  at age 84.   Father: \"hole in heart\".  in his 70s of emphysema   Sister: conduction disorder, alive age 56   Grandfather:  suddenly in his 20s    REVIEW OF SYSTEMS:   CONST:  No weight loss, fever, chills, weakness or fatigue.   HEENT:  No visual loss, blurred vision, double vision, yellow sclerae.                   No hearing loss, congestion, sore throat.   SKIN:      No rashes, urticaria, ulcers, sores.     RESP:     No shortness of breath, hemoptysis, cough, sputum.   GI:           No anorexia, nausea, vomiting, diarrhea. No abdominal pain, melena.   :         No burning on urination, hematuria or increased frequency.  ENDO:    No diaphoresis, cold or heat intolerance. No polyuria or polydipsia.   NEURO:  No headache, dizziness, syncope, paralysis, ataxia, or parasthesias.                  No change in bowel or bladder control. No history of CVA/TIA  MUSC:    No muscle, back pain, joint pain or stiffness.   HEME:    No anemia, bleeding, bruising. No history of " "DVT/PE.  PSYCH:  No history of depression, anxiety    Vitals:    03/23/18 1217   BP: 104/76   BP Location: Left arm   Patient Position: Sitting   Pulse: 78   Weight: 108 kg (237 lb)   Height: 190.5 cm (75\")                 Physical Exam:  GEN: Well nourished, Well- developed  No acute distress  HEENT: Normocephalic, Atraumatic, PERRLA, moist mucous membranes  NECK: supple, NO JVD, no thyromegaly, no lymphadenopathy  CARD: S1S2  RRR S3 No S4, gallop, rub  LUNGS: Clear to auscultataion, normal respiratory effort  ABDOMEN: Soft, nontender, normal bowel sounds  EXTREMITIES:No gross deformities,  No clubbing, cyanosis, or edema  SKIN: Warm, dry  NEURO: No focal deficits  PSYCHIATRIC: Normal affect and mood        ECG 12 Lead  Date/Time: 3/23/2018 1:06 PM  Performed by: MICAH KEARNEY  Authorized by: MICAH KEARNEY   Rhythm: sinus rhythm  BPM: 72               ICD Check: normal function, 5.5 years on battery. No AFIB or VT.       ICD-10-CM ICD-9-CM   1. VT (ventricular tachycardia) I47.2 427.1   2. Dilated cardiomyopathy I42.0 425.4   3. Chronic systolic congestive heart failure I50.22 428.22     428.0       Assessment and Plan:   1. VT: Currently on Amiodarone, due to potential side effects in his young age, would stop Amiodarone. After being off for one week, will start Sotalol 80 mg BID and lower Coreg to 12.5 mg BID. He will need an EKG 48 hours after starting Sotalol.   2. Dilated Cardiomyopathy/Chronic systolic CHF - EF 40-45%, Class II symptoms on Coreg and ACE. Normal ICD function  3. HTN - controlled.     Scribed for Micah Kearney MD by Chhaya German PA-C. 3/23/2018  1:08 PM     Micah SIN MD, personally performed the services described in this documentation as scribed by the above named individual in my presence, and it is both accurate and complete.  3/23/2018  1:10 PM    "

## 2018-03-23 ENCOUNTER — CONSULT (OUTPATIENT)
Dept: CARDIOLOGY | Facility: CLINIC | Age: 53
End: 2018-03-23

## 2018-03-23 VITALS
HEART RATE: 78 BPM | BODY MASS INDEX: 29.47 KG/M2 | HEIGHT: 75 IN | SYSTOLIC BLOOD PRESSURE: 104 MMHG | WEIGHT: 237 LBS | DIASTOLIC BLOOD PRESSURE: 76 MMHG

## 2018-03-23 DIAGNOSIS — I47.20 VT (VENTRICULAR TACHYCARDIA) (HCC): Primary | ICD-10-CM

## 2018-03-23 DIAGNOSIS — I50.22 CHRONIC SYSTOLIC CONGESTIVE HEART FAILURE (HCC): ICD-10-CM

## 2018-03-23 DIAGNOSIS — I42.0 DILATED CARDIOMYOPATHY (HCC): ICD-10-CM

## 2018-03-23 PROCEDURE — 99204 OFFICE O/P NEW MOD 45 MIN: CPT | Performed by: INTERNAL MEDICINE

## 2018-03-23 PROCEDURE — 93283 PRGRMG EVAL IMPLANTABLE DFB: CPT | Performed by: INTERNAL MEDICINE

## 2018-03-23 RX ORDER — SOTALOL HYDROCHLORIDE 80 MG/1
80 TABLET ORAL 2 TIMES DAILY
Qty: 60 TABLET | Refills: 5 | Status: SHIPPED | OUTPATIENT
Start: 2018-03-23 | End: 2018-08-31 | Stop reason: SDUPTHER

## 2018-03-29 ENCOUNTER — OFFICE VISIT (OUTPATIENT)
Dept: CARDIOLOGY | Facility: CLINIC | Age: 53
End: 2018-03-29

## 2018-03-29 VITALS
BODY MASS INDEX: 29.19 KG/M2 | SYSTOLIC BLOOD PRESSURE: 106 MMHG | WEIGHT: 234.8 LBS | DIASTOLIC BLOOD PRESSURE: 72 MMHG | HEIGHT: 75 IN | OXYGEN SATURATION: 98 % | HEART RATE: 67 BPM

## 2018-03-29 DIAGNOSIS — R06.02 SHORTNESS OF BREATH: Primary | ICD-10-CM

## 2018-03-29 DIAGNOSIS — I42.9 CARDIOMYOPATHY, UNSPECIFIED TYPE (HCC): ICD-10-CM

## 2018-03-29 DIAGNOSIS — I50.20 SYSTOLIC CONGESTIVE HEART FAILURE, UNSPECIFIED CONGESTIVE HEART FAILURE CHRONICITY: ICD-10-CM

## 2018-03-29 DIAGNOSIS — I48.0 PAROXYSMAL ATRIAL FIBRILLATION (HCC): ICD-10-CM

## 2018-03-29 DIAGNOSIS — I50.9 CONGESTIVE HEART FAILURE, UNSPECIFIED CONGESTIVE HEART FAILURE CHRONICITY, UNSPECIFIED CONGESTIVE HEART FAILURE TYPE: ICD-10-CM

## 2018-03-29 DIAGNOSIS — I42.0 DILATED CARDIOMYOPATHY (HCC): ICD-10-CM

## 2018-03-29 DIAGNOSIS — I47.20 VT (VENTRICULAR TACHYCARDIA) (HCC): ICD-10-CM

## 2018-03-29 DIAGNOSIS — R53.83 FATIGUE, UNSPECIFIED TYPE: ICD-10-CM

## 2018-03-29 DIAGNOSIS — R07.9 CHEST PAIN, UNSPECIFIED TYPE: ICD-10-CM

## 2018-03-29 DIAGNOSIS — I95.9 HYPOTENSION, UNSPECIFIED HYPOTENSION TYPE: ICD-10-CM

## 2018-03-29 PROCEDURE — 99213 OFFICE O/P EST LOW 20 MIN: CPT | Performed by: PHYSICIAN ASSISTANT

## 2018-03-29 NOTE — PROGRESS NOTES
Problem list     Subjective   Bill Umana is a 52 y.o. male     Chief Complaint   Patient presents with   • Congestive Heart Failure     Here for heart cath. f/u   • Cardiomyopathy   • Atrial Fibrillation   • Hyperlipidemia   • Hypertension       HPI    Problem list  1.  History of nonischemic dilated cardiomyopathy status post AICD implant ([employer])   1.1 recent interrogation demonstrates 4 episodes of nonsustained ventricular tachycardia and one episode of ventricular tachycardia converted with ATP without defibrillation  1.2 patient on Sotalol for ventricular tachycardia followed by EP services  2.  Chronic systolic heart failure  2.1 EF estimated at 35-40% by echocardiogram February 2018  2.2 EF estimated in the low 40s post stress December 2018  2.3 EF by ventriculography during catheterization March 2018 at 40-45%  3.  Atrial fibrillation on Coumadin therapy  4.  Dyslipidemia  5.  GERD  6.  Mild mitral and tricuspid regurgitation  7.  Cardiac catheterization March 2018 with normal coronaries and an EF estimated at 4045%    Patient is a 52-year-old male that presents back for follow-up he has done well.  Occasionally he will experience chest pressure but this happens on occasion and usually is experienced with exertion.  He will experience mild shortness of breath when exerting to moderate levels but nothing that has been progressive.  No PND orthopnea.  He doesn't palpitate or dysrhythmic symptoms and otherwise is doing well.  He recently seen in EP services who discontinued the amiodarone and start him on sotalol at this time.      Outpatient Encounter Prescriptions as of 3/29/2018   Medication Sig Dispense Refill   • albuterol (PROVENTIL HFA;VENTOLIN HFA) 108 (90 Base) MCG/ACT inhaler Inhale 2 puffs Every 4 (Four) Hours As Needed for Wheezing.     • aspirin 81 MG tablet Take 1 tablet by mouth Daily. 30 tablet 11   • busPIRone (BUSPAR) 10 MG tablet Take 10 mg by mouth 2 (Two) Times a Day.     •  carvedilol (COREG) 25 MG tablet Take 25 mg by mouth 2 (Two) Times a Day With Meals.     • dicyclomine (BENTYL) 10 MG capsule Take 10 mg by mouth Daily.     • FLUOXETINE HCL PO Take 40 mg by mouth Daily.     • Fluticasone Furoate-Vilanterol (BREO ELLIPTA IN) Inhale.     • furosemide (LASIX) 40 MG tablet Take 1 tablet by mouth Daily. 30 tablet 5   • gabapentin (NEURONTIN) 300 MG capsule 2 (Two) Times a Day.  2   • ibuprofen (ADVIL,MOTRIN) 800 MG tablet prn     • methocarbamol (ROBAXIN) 750 MG tablet 3 (Three) Times a Day.  1   • pantoprazole (PROTONIX) 40 MG EC tablet Take 40 mg by mouth Daily.     • potassium chloride (K-DUR) 10 MEQ CR tablet Take 1 tablet by mouth Daily. 30 tablet 5   • ramipril (ALTACE) 5 MG capsule Take 5 mg by mouth Daily.     • simvastatin (ZOCOR) 20 MG tablet Take 20 mg by mouth Every Night.     • sotalol (BETAPACE) 80 MG tablet Take 1 tablet by mouth 2 (Two) Times a Day. 60 tablet 5   • traZODone (DESYREL) 100 MG tablet Take 100 mg by mouth 3 (Three) Times a Day.     • warfarin (COUMADIN) 5 MG tablet Take 5 mg by mouth Daily. PCP follows.  Patient takes 7.5 mg 3 x week and 5 mg all other day's     • nitroglycerin (NITROSTAT) 0.4 MG SL tablet 1 under the tongue as needed for angina, may repeat q5mins for up three doses 100 tablet 11     No facility-administered encounter medications on file as of 3/29/2018.        Review of patient's allergies indicates no known allergies.    Past Medical History:   Diagnosis Date   • Atrial fibrillation    • Hypertension    • ICD (implantable cardioverter-defibrillator) in place        Social History     Social History   • Marital status:      Spouse name: N/A   • Number of children: N/A   • Years of education: N/A     Occupational History   • Not on file.     Social History Main Topics   • Smoking status: Never Smoker   • Smokeless tobacco: Never Used   • Alcohol use No   • Drug use: No   • Sexual activity: Defer     Other Topics Concern   • Not on  "file     Social History Narrative   • No narrative on file       Family History   Problem Relation Age of Onset   • No Known Problems Mother    • No Known Problems Father        Review of Systems   Constitutional: Positive for fatigue.   HENT: Negative.    Eyes: Positive for visual disturbance (glasses).   Respiratory: Positive for shortness of breath.    Cardiovascular: Positive for chest pain (\"tightness\"). Negative for palpitations and leg swelling.   Gastrointestinal: Negative.    Endocrine: Negative.    Genitourinary: Negative.    Musculoskeletal: Positive for arthralgias and myalgias.   Skin: Negative.    Allergic/Immunologic: Positive for environmental allergies.   Neurological: Negative.    Hematological: Bruises/bleeds easily (bleed).   Psychiatric/Behavioral: Positive for sleep disturbance.       Objective   Vitals:    03/29/18 0905   BP: 106/72   BP Location: Left arm   Patient Position: Sitting   Pulse: 67   SpO2: 98%   Weight: 107 kg (234 lb 12.8 oz)   Height: 190.5 cm (75\")      /72 (BP Location: Left arm, Patient Position: Sitting)   Pulse 67   Ht 190.5 cm (75\")   Wt 107 kg (234 lb 12.8 oz)   SpO2 98%   BMI 29.35 kg/m²     Lab Results (most recent)     None          Physical Exam   Constitutional: He is oriented to person, place, and time. He appears well-developed and well-nourished. No distress.   HENT:   Head: Normocephalic and atraumatic.   Eyes: EOM are normal. Pupils are equal, round, and reactive to light.   Neck: No JVD present.   Cardiovascular: Normal rate, regular rhythm, normal heart sounds and intact distal pulses.  Exam reveals no gallop and no friction rub.    No murmur heard.  Pulmonary/Chest: Effort normal and breath sounds normal. No respiratory distress. He has no wheezes. He has no rales. He exhibits no tenderness.   Musculoskeletal: Normal range of motion. He exhibits no edema.   Neurological: He is alert and oriented to person, place, and time. No cranial nerve " deficit.   Skin: Skin is warm and dry. No rash noted. No erythema. No pallor.   Psychiatric: He has a normal mood and affect. His behavior is normal.       Procedure   Procedures       Assessment/Plan     Problems Addressed this Visit        Cardiovascular and Mediastinum    Systolic congestive heart failure    Dilated cardiomyopathy    Paroxysmal atrial fibrillation    VT (ventricular tachycardia)       Respiratory    Shortness of breath - Primary       Nervous and Auditory    Chest pain      Other Visit Diagnoses    None.             Recommendation  1.  Patient doing well at this time.  We discussed possible therapy in regards to patient's recurrent chest pain but he would like to monitor for now.  He has good coronaries and systolic function is not critically depressed.  He will monitor his symptoms closely.  He will start sotalol on Sunday with recommendations by EP services and follow-up EKG.  We will need to sit back up for repeat interrogation.  2.  Otherwise he is doing remarkably well.  He is compensated and we will see him back for follow-up in 3 months.  Follow-up primary as scheduled              Discussed the patient's BMI with him. BMI is within normal parameters. No follow-up required.       Electronically signed by:

## 2018-05-24 ENCOUNTER — DOCUMENTATION (OUTPATIENT)
Dept: CARDIOLOGY | Facility: CLINIC | Age: 53
End: 2018-05-24

## 2018-05-24 NOTE — PROGRESS NOTES
Cardiac clearance req was received in office from  [facility]. This was reviewed by Devaughn Terrazas PA-C and faxed back . -;[facility]

## 2018-06-27 ENCOUNTER — OFFICE VISIT (OUTPATIENT)
Dept: CARDIOLOGY | Facility: CLINIC | Age: 53
End: 2018-06-27

## 2018-06-27 VITALS
BODY MASS INDEX: 28 KG/M2 | HEART RATE: 66 BPM | SYSTOLIC BLOOD PRESSURE: 102 MMHG | WEIGHT: 225.2 LBS | DIASTOLIC BLOOD PRESSURE: 67 MMHG | HEIGHT: 75 IN | OXYGEN SATURATION: 99 %

## 2018-06-27 DIAGNOSIS — R07.9 CHEST PAIN, UNSPECIFIED TYPE: ICD-10-CM

## 2018-06-27 DIAGNOSIS — I42.0 DILATED CARDIOMYOPATHY (HCC): ICD-10-CM

## 2018-06-27 DIAGNOSIS — R06.02 SHORTNESS OF BREATH: Primary | ICD-10-CM

## 2018-06-27 PROCEDURE — 99213 OFFICE O/P EST LOW 20 MIN: CPT | Performed by: PHYSICIAN ASSISTANT

## 2018-06-27 RX ORDER — DICYCLOMINE HYDROCHLORIDE 10 MG/1
10 CAPSULE ORAL DAILY
Qty: 30 CAPSULE | Refills: 11 | Status: SHIPPED | OUTPATIENT
Start: 2018-06-27

## 2018-06-27 RX ORDER — PANTOPRAZOLE SODIUM 40 MG/1
40 TABLET, DELAYED RELEASE ORAL DAILY
Qty: 30 TABLET | Refills: 11 | Status: SHIPPED | OUTPATIENT
Start: 2018-06-27 | End: 2019-05-24 | Stop reason: SDUPTHER

## 2018-06-27 NOTE — PATIENT INSTRUCTIONS
Obesity, Adult  Obesity is the condition of having too much total body fat. Being overweight or obese means that your weight is greater than what is considered healthy for your body size. Obesity is determined by a measurement called BMI. BMI is an estimate of body fat and is calculated from height and weight. For adults, a BMI of 30 or higher is considered obese.  Obesity can eventually lead to other health concerns and major illnesses, including:  · Stroke.  · Coronary artery disease (CAD).  · Type 2 diabetes.  · Some types of cancer, including cancers of the colon, breast, uterus, and gallbladder.  · Osteoarthritis.  · High blood pressure (hypertension).  · High cholesterol.  · Sleep apnea.  · Gallbladder stones.  · Infertility problems.    What are the causes?  The main cause of obesity is taking in (consuming) more calories than your body uses for energy. Other factors that contribute to this condition may include:  · Being born with genes that make you more likely to become obese.  · Having a medical condition that causes obesity. These conditions include:  ? Hypothyroidism.  ? Polycystic ovarian syndrome (PCOS).  ? Binge-eating disorder.  ? Cushing syndrome.  · Taking certain medicines, such as steroids, antidepressants, and seizure medicines.  · Not being physically active (sedentary lifestyle).  · Living where there are limited places to exercise safely or buy healthy foods.  · Not getting enough sleep.    What increases the risk?  The following factors may increase your risk of this condition:  · Having a family history of obesity.  · Being a woman of -American descent.  · Being a man of  descent.    What are the signs or symptoms?  Having excessive body fat is the main symptom of this condition.  How is this diagnosed?  This condition may be diagnosed based on:  · Your symptoms.  · Your medical history.  · A physical exam. Your health care provider may measure:  ? Your BMI. If you are an  adult with a BMI between 25 and less than 30, you are considered overweight. If you are an adult with a BMI of 30 or higher, you are considered obese.  ? The distances around your hips and your waist (circumferences). These may be compared to each other to help diagnose your condition.  ? Your skinfold thickness. Your health care provider may gently pinch a fold of your skin and measure it.    How is this treated?  Treatment for this condition often includes changing your lifestyle. Treatment may include some or all of the following:  · Dietary changes. Work with your health care provider and a dietitian to set a weight-loss goal that is healthy and reasonable for you. Dietary changes may include eating:  ? Smaller portions. A portion size is the amount of a particular food that is healthy for you to eat at one time. This varies from person to person.  ? Low-calorie or low-fat options.  ? More whole grains, fruits, and vegetables.  · Regular physical activity. This may include aerobic activity (cardio) and strength training.  · Medicine to help you lose weight. Your health care provider may prescribe medicine if you are unable to lose 1 pound a week after 6 weeks of eating more healthily and doing more physical activity.  · Surgery. Surgical options may include gastric banding and gastric bypass. Surgery may be done if:  ? Other treatments have not helped to improve your condition.  ? You have a BMI of 40 or higher.  ? You have life-threatening health problems related to obesity.    Follow these instructions at home:    Eating and drinking    · Follow recommendations from your health care provider about what you eat and drink. Your health care provider may advise you to:  ? Limit fast foods, sweets, and processed snack foods.  ? Choose low-fat options, such as low-fat milk instead of whole milk.  ? Eat 5 or more servings of fruits or vegetables every day.  ? Eat at home more often. This gives you more control over  what you eat.  ? Choose healthy foods when you eat out.  ? Learn what a healthy portion size is.  ? Keep low-fat snacks on hand.  ? Avoid sugary drinks, such as soda, fruit juice, iced tea sweetened with sugar, and flavored milk.  ? Eat a healthy breakfast.  · Drink enough water to keep your urine clear or pale yellow.  · Do not go without eating for long periods of time (do not fast) or follow a fad diet. Fasting and fad diets can be unhealthy and even dangerous.  Physical Activity  · Exercise regularly, as told by your health care provider. Ask your health care provider what types of exercise are safe for you and how often you should exercise.  · Warm up and stretch before being active.  · Cool down and stretch after being active.  · Rest between periods of activity.  Lifestyle  · Limit the time that you spend in front of your TV, computer, or video game system.  · Find ways to reward yourself that do not involve food.  · Limit alcohol intake to no more than 1 drink a day for nonpregnant women and 2 drinks a day for men. One drink equals 12 oz of beer, 5 oz of wine, or 1½ oz of hard liquor.  General instructions  · Keep a weight loss journal to keep track of the food you eat and how much you exercise you get.  · Take over-the-counter and prescription medicines only as told by your health care provider.  · Take vitamins and supplements only as told by your health care provider.  · Consider joining a support group. Your health care provider may be able to recommend a support group.  · Keep all follow-up visits as told by your health care provider. This is important.  Contact a health care provider if:  · You are unable to meet your weight loss goal after 6 weeks of dietary and lifestyle changes.  This information is not intended to replace advice given to you by your health care provider. Make sure you discuss any questions you have with your health care provider.  Document Released: 01/25/2006 Document Revised:  05/22/2017 Document Reviewed: 10/05/2016  [employer] Interactive Patient Education © 2018 Else[employer] Inc.  MyPlate from [employer]  The general, healthful diet is based on the 2010 Dietary Guidelines for Americans. The amount of food you need to eat from each food group depends on your age, sex, and level of physical activity and can be individualized by a dietitian. Go to ChooseMyPlate.gov for more information.  What do I need to know about the MyPlate plan?  · Enjoy your food, but eat less.  · Avoid oversized portions.  ? ½ of your plate should include fruits and vegetables.  ? ¼ of your plate should be grains.  ? ¼ of your plate should be protein.  Grains  · Make at least half of your grains whole grains.  · For a 2,000 calorie daily food plan, eat 6 oz every day.  · 1 oz is about 1 slice bread, 1 cup cereal, or ½ cup cooked rice, cereal, or pasta.  Vegetables  · Make half your plate fruits and vegetables.  · For a 2,000 calorie daily food plan, eat 2½ cups every day.  · 1 cup is about 1 cup raw or cooked vegetables or vegetable juice or 2 cups raw leafy greens.  Fruits  · Make half your plate fruits and vegetables.  · For a 2,000 calorie daily food plan, eat 2 cups every day.  · 1 cup is about 1 cup fruit or 100% fruit juice or ½ cup dried fruit.  Protein  · For a 2,000 calorie daily food plan, eat 5½ oz every day.  · 1 oz is about 1 oz meat, poultry, or fish, ¼ cup cooked beans, 1 egg, 1 Tbsp peanut butter, or ½ oz nuts or seeds.  Dairy  · Switch to fat-free or low-fat (1%) milk.  · For a 2,000 calorie daily food plan, eat 3 cups every day.  · 1 cup is about 1 cup milk or yogurt or soy milk (soy beverage), 1½ oz natural cheese, or 2 oz processed cheese.  Fats, Oils, and Empty Calories  · Only small amounts of oils are recommended.  · Empty calories are calories from solid fats or added sugars.  · Compare sodium in foods like soup, bread, and frozen meals. Choose the foods with lower numbers.  · Drink water instead of  sugary drinks.  What foods can I eat?  Grains  Whole grains such as whole wheat, quinoa, millet, and bulgur. Bread, rolls, and pasta made from whole grains. Brown or wild rice. Hot or cold cereals made from whole grains and without added sugar.  Vegetables  All fresh vegetables, especially fresh red, dark green, or orange vegetables. Peas and beans. Low-sodium frozen or canned vegetables prepared without added salt. Low-sodium vegetable juices.  Fruits  All fresh, frozen, and dried fruits. Canned fruit packed in water or fruit juice without added sugar. Fruit juices without added sugar.  Meats and Other Protein Sources  Boiled, baked, or grilled lean meat trimmed of fat. Skinless poultry. Fresh seafood and shellfish. Canned seafood packed in water. Unsalted nuts and unsalted nut butters. Tofu. Dried beans and pea. Eggs.  Dairy  Low-fat or fat-free milk, yogurt, and cheeses.  Sweets and Desserts  Frozen desserts made from low-fat milk.  Fats and Oils  Olive, peanut, and canola oils and margarine. Salad dressing and mayonnaise made from these oils.  Other  Soups and casseroles made from allowed ingredients and without added fat or salt.  The items listed above may not be a complete list of recommended foods or beverages. Contact your dietitian for more options.  What foods are not recommended?  Grains  Sweetened, low-fiber cereals. Packaged baked goods. Snack crackers and chips. Cheese crackers, butter crackers, and biscuits. Frozen waffles, sweet breads, doughnuts, pastries, packaged baking mixes, pancakes, cakes, and cookies.  Vegetables  Regular canned or frozen vegetables or vegetables prepared with salt. Canned tomatoes. Canned tomato sauce. Fried vegetables. Vegetables in cream sauce or cheese sauce.  Fruits  Fruits packed in syrup or made with added sugar.  Meats and Other Protein Sources  Marbled or fatty meats such as ribs. Poultry with skin. Fried meats, poultry, eggs, or fish. Sausages, hot dogs, and deli  meats such as pastrami, bologna, or salami.  Dairy  Whole milk, cream, cheeses made from whole milk, sour cream. Ice cream or yogurt made from whole milk or with added sugar.  Beverages  For adults, no more than one alcoholic drink per day. Regular soft drinks or other sugary beverages. Juice drinks.  Sweets and Desserts  Sugary or fatty desserts, candy, and other sweets.  Fats and Oils  Solid shortening or partially hydrogenated oils. Solid margarine. Margarine that contains trans fats. Butter.  The items listed above may not be a complete list of foods and beverages to avoid. Contact your dietitian for more information.  This information is not intended to replace advice given to you by your health care provider. Make sure you discuss any questions you have with your health care provider.  Document Released: 01/06/2009 Document Revised: 05/25/2017 Document Reviewed: 11/26/2014  [employer] Interactive Patient Education © 2018 Elsevier Inc.

## 2018-06-27 NOTE — PROGRESS NOTES
Problem list     Subjective   Bill Umana is a 53 y.o. male     Chief Complaint   Patient presents with   • Cardiomyopathy     Here for 3 mo. f/u   • Congestive Heart Failure   • Atrial Fibrillation   • Hyperlipidemia   • Hypertension       HPI    Problem list  1.  History of nonischemic dilated cardiomyopathy status post AICD implant ([employer])   1.1 recent interrogation demonstrates 4 episodes of nonsustained ventricular tachycardia and one episode of ventricular tachycardia converted with ATP without defibrillation  1.2 patient on Sotalol for ventricular tachycardia followed by EP services  2.  Chronic systolic heart failure  2.1 EF estimated at 35-40% by echocardiogram February 2018  2.2 EF estimated in the low 40s post stress December 2018  2.3 EF by ventriculography during catheterization March 2018 at 40-45%  3.  Atrial fibrillation on Coumadin therapy  4.  Dyslipidemia  5.  GERD  6.  Mild mitral and tricuspid regurgitation  7.  Cardiac catheterization March 2018 with normal coronaries and an EF estimated at 4045%    Patient is a 53-year-old male that presents back for follow-up.  Patient has been doing well other than fatigue.  He has noticed significant hypotension home.  He describes a lack anergy and fatigue which have been concerning.  He has chest tightness that occurs on occasion but that has not changed and is chronic.  Shortness of breath is mild but not progressive dyspnea.  No PND orthopnea.  Edema is controlled and otherwise he is doing well    Outpatient Encounter Prescriptions as of 6/27/2018   Medication Sig Dispense Refill   • aspirin 81 MG tablet Take 1 tablet by mouth Daily. 30 tablet 11   • busPIRone (BUSPAR) 10 MG tablet Take 10 mg by mouth 2 (Two) Times a Day.     • carvedilol (COREG) 25 MG tablet Take 25 mg by mouth 2 (Two) Times a Day With Meals.     • dicyclomine (BENTYL) 10 MG capsule Take 1 capsule by mouth Daily. 30 capsule 11   • FLUOXETINE HCL PO Take 40 mg by mouth  Daily.     • Fluticasone Furoate-Vilanterol (BREO ELLIPTA IN) Inhale 2 puffs Daily As Needed.     • furosemide (LASIX) 40 MG tablet Take 1 tablet by mouth Daily. 30 tablet 5   • gabapentin (NEURONTIN) 300 MG capsule 2 (Two) Times a Day.  2   • ibuprofen (ADVIL,MOTRIN) 800 MG tablet prn     • methocarbamol (ROBAXIN) 750 MG tablet 3 (Three) Times a Day.  1   • pantoprazole (PROTONIX) 40 MG EC tablet Take 1 tablet by mouth Daily. 30 tablet 11   • potassium chloride (K-DUR) 10 MEQ CR tablet Take 1 tablet by mouth Daily. 30 tablet 5   • simvastatin (ZOCOR) 20 MG tablet Take 20 mg by mouth Every Night.     • sotalol (BETAPACE) 80 MG tablet Take 1 tablet by mouth 2 (Two) Times a Day. 60 tablet 5   • traZODone (DESYREL) 100 MG tablet Take 200 mg by mouth Every Night.     • warfarin (COUMADIN) 5 MG tablet Take 5 mg by mouth Daily. PCP follows.  Patient takes 7.5 mg 3 x week and 5 mg all other day's     • [DISCONTINUED] dicyclomine (BENTYL) 10 MG capsule Take 10 mg by mouth Daily.     • [DISCONTINUED] pantoprazole (PROTONIX) 40 MG EC tablet Take 40 mg by mouth Daily.     • [DISCONTINUED] ramipril (ALTACE) 5 MG capsule Take 5 mg by mouth Daily.     • nitroglycerin (NITROSTAT) 0.4 MG SL tablet 1 under the tongue as needed for angina, may repeat q5mins for up three doses 100 tablet 11   • sacubitril-valsartan (ENTRESTO) 24-26 MG tablet Take 1 tablet by mouth Every 12 (Twelve) Hours. 60 tablet 6   • [DISCONTINUED] albuterol (PROVENTIL HFA;VENTOLIN HFA) 108 (90 Base) MCG/ACT inhaler Inhale 2 puffs Every 4 (Four) Hours As Needed for Wheezing.       No facility-administered encounter medications on file as of 6/27/2018.        Patient has no known allergies.    Past Medical History:   Diagnosis Date   • Atrial fibrillation    • Hyperlipidemia    • Hypertension    • ICD (implantable cardioverter-defibrillator) in place        Social History     Social History   • Marital status:      Spouse name: N/A   • Number of children:  "N/A   • Years of education: N/A     Occupational History   • Not on file.     Social History Main Topics   • Smoking status: Never Smoker   • Smokeless tobacco: Never Used   • Alcohol use No   • Drug use: No   • Sexual activity: Defer     Other Topics Concern   • Not on file     Social History Narrative   • No narrative on file       Family History   Problem Relation Age of Onset   • No Known Problems Mother    • No Known Problems Father        Review of Systems   Constitutional: Positive for fatigue.   HENT: Negative.    Eyes: Positive for visual disturbance (glasses).   Respiratory: Positive for shortness of breath (occas.).    Cardiovascular: Positive for chest pain (\"tightness\"), palpitations and leg swelling (occas.).   Gastrointestinal: Negative.    Endocrine: Negative.    Genitourinary: Negative.    Musculoskeletal: Negative.    Skin: Negative.    Allergic/Immunologic: Positive for environmental allergies.   Neurological: Positive for light-headedness.   Hematological: Bruises/bleeds easily.   Psychiatric/Behavioral: Positive for sleep disturbance.   All other systems reviewed and are negative.      Objective   Vitals:    06/27/18 0859   BP: 102/67   BP Location: Left arm   Patient Position: Sitting   Pulse: 66   SpO2: 99%   Weight: 102 kg (225 lb 3.2 oz)   Height: 190.5 cm (75\")      /67 (BP Location: Left arm, Patient Position: Sitting)   Pulse 66   Ht 190.5 cm (75\")   Wt 102 kg (225 lb 3.2 oz)   SpO2 99%   BMI 28.15 kg/m²     Lab Results (most recent)     None          Physical Exam   Constitutional: He is oriented to person, place, and time. He appears well-developed and well-nourished. No distress.   HENT:   Head: Normocephalic and atraumatic.   Eyes: EOM are normal. Pupils are equal, round, and reactive to light.   Neck: No JVD present.   Cardiovascular: Normal rate, regular rhythm, normal heart sounds and intact distal pulses.  Exam reveals no gallop and no friction rub.    No murmur " heard.  Pulmonary/Chest: Effort normal and breath sounds normal. No respiratory distress. He has no wheezes. He has no rales. He exhibits no tenderness.   Musculoskeletal: Normal range of motion. He exhibits no edema.   Neurological: He is alert and oriented to person, place, and time. No cranial nerve deficit.   Skin: Skin is warm and dry. No rash noted. No erythema. No pallor.   Psychiatric: He has a normal mood and affect. His behavior is normal.   Nursing note and vitals reviewed.      Procedure   Procedures       Assessment/Plan     Problems Addressed this Visit        Cardiovascular and Mediastinum    Dilated cardiomyopathy    Relevant Medications    sacubitril-valsartan (ENTRESTO) 24-26 MG tablet       Respiratory    Shortness of breath - Primary       Nervous and Auditory    Chest pain            Recommendation  1.  To help treat patient's cardiomyopathy, I would like to stop his ramipril and transition to entresto.  We use a low-dose of entresto because of hypotension.  He will start this on Friday after stopping ramipril today.  He will monitor blood pressures closely.  We may have to decrease carvedilol to help in regards to patient's fatigue and hypotension.  He will call back in a week in that regard.  2.  Chest tightness is atypical and recent catheterization demonstrating normal coronaries.  He is on appropriate medications otherwise.  We will see back for follow-up in 6 months or sooner symptoms discussed.  Follow-up primary as scheduled          Patient's Body mass index is 28.15 kg/m². BMI is above normal parameters. Recommendations include: educational material.       Electronically signed by:

## 2018-07-02 ENCOUNTER — PRIOR AUTHORIZATION (OUTPATIENT)
Dept: CARDIOLOGY | Facility: CLINIC | Age: 53
End: 2018-07-02

## 2018-07-03 ENCOUNTER — TELEPHONE (OUTPATIENT)
Dept: CARDIOLOGY | Facility: CLINIC | Age: 53
End: 2018-07-03

## 2018-07-03 DIAGNOSIS — I42.9 CARDIOMYOPATHY, UNSPECIFIED TYPE (HCC): Primary | ICD-10-CM

## 2018-07-03 RX ORDER — CARVEDILOL 12.5 MG/1
12.5 TABLET ORAL 2 TIMES DAILY
Qty: 60 TABLET | Refills: 5 | Status: SHIPPED | OUTPATIENT
Start: 2018-07-03 | End: 2019-01-28

## 2018-07-03 NOTE — TELEPHONE ENCOUNTER
"PATIENT STATES STILL WITH DIZZINESS AND LIGHTHEADEDNESS AND B/P RUNNING 99/60. STATES WENT TO [facility] LAST NIGHT DUE TO \"PASSED OUT COMPLETELY\", WAS OUT FOR APPROX. A MINUTE OR SO. ER RECORDS PRINTED. PER POLLO DURAN, PAC DECREASE COREG TO 12.5 MG PO BID, MONITOR B/P AND H/R. PATIENT IS AWARE OF THIS AND TO CALL OFFICE BACK IN 1-2 WEEKS WITH SX UPDATE AND HOW B/P AND H/R IS. VERBALIZED HE WOULD. [place] FRAGOSO          ----- Message from April Johnston MA sent at 7/3/2018  1:25 PM EDT -----  Patient called stating that Pollo recently started him on entresto. Patient states he has been having issues with his blood pressure and has passed out. Call back is [phone]    "

## 2018-07-09 ENCOUNTER — TELEPHONE (OUTPATIENT)
Dept: CARDIOLOGY | Facility: CLINIC | Age: 53
End: 2018-07-09

## 2018-07-09 NOTE — TELEPHONE ENCOUNTER
PATIENT STATES B/P HAS BEEN RUNNING 90/62 TODAY, 80-90'S / 50-60'S AND H/R IN 50'S. STATES THE BACK OF HIS HEAD HAS BEEN POUNDING FOR 4 DAYS NOW. STATES HE ALMOST PASSED OUT AGAIN OVER WEEKEND, DIZZINESS.PER POLLO DURAN, PAC HOLD LASIX FOR A COUPLE OF DAYS, INCREASE FLUID INTAKE, INCREASE NA INTAKE, AND CALL OFFICE ON Thursday WITH B/P AND SX CHECK. PATIENT AWARE VERBALIZED OK. [place] FRAGOSO        ----- Message from April Johnston MA sent at 7/9/2018  9:32 AM EDT -----  Patient called to report blood pressure log. He also states he has had a massive headache recently and cloudy vision in left eye. Call back for the patient is [phone]

## 2018-07-12 ENCOUNTER — TELEPHONE (OUTPATIENT)
Dept: CARDIOLOGY | Facility: CLINIC | Age: 53
End: 2018-07-12

## 2018-07-12 DIAGNOSIS — I95.9 HYPOTENSION, UNSPECIFIED HYPOTENSION TYPE: Primary | ICD-10-CM

## 2018-07-12 DIAGNOSIS — I42.9 CARDIOMYOPATHY, UNSPECIFIED TYPE (HCC): ICD-10-CM

## 2018-07-12 DIAGNOSIS — R53.83 FATIGUE, UNSPECIFIED TYPE: ICD-10-CM

## 2018-07-12 DIAGNOSIS — I50.9 CONGESTIVE HEART FAILURE, UNSPECIFIED CONGESTIVE HEART FAILURE CHRONICITY, UNSPECIFIED CONGESTIVE HEART FAILURE TYPE: ICD-10-CM

## 2018-07-12 RX ORDER — FLUDROCORTISONE ACETATE 0.1 MG/1
0.1 TABLET ORAL DAILY
Qty: 30 TABLET | Refills: 5 | Status: SHIPPED | OUTPATIENT
Start: 2018-07-12 | End: 2020-06-09 | Stop reason: SDUPTHER

## 2018-07-12 NOTE — TELEPHONE ENCOUNTER
V/O PER POLLO DURAN, PAC TO HAVE CBC,CMP,TSH DRAWN AND SEND IN FLORINEF 0.1 MG PO DAILY, MONITOR B/P AND H/R AND CALL OFFICE BACK IN A WEEK WITH B/P READINGS. PATIENT HAS STILL BEEN HOLDING COREG, EXNTRESTO,LASIX ETC. AND B/P STILL HAS REBOUNDED MUCH AT ALL. PATIENT AWARE OF ABOVE AND HE VERBALIZED HE UNDERSTOOD. GOING TO [facility] IN AM FOR LABS. LAB ORDERS FAXED TO HOSP. LAB. [place] FRAGOSO        ----- Message from April Johnston MA sent at 7/12/2018 11:17 AM EDT -----  Patient called with blood pressure readings. Systolic ranges from  and diastolic ranges is 59/63 call back is [phone]

## 2018-07-18 ENCOUNTER — TELEPHONE (OUTPATIENT)
Dept: CARDIOLOGY | Facility: CLINIC | Age: 53
End: 2018-07-18

## 2018-07-18 NOTE — TELEPHONE ENCOUNTER
----- Message from April Johnston MA sent at 7/18/2018 11:03 AM EDT -----  Patient is calling today to report blood pressures. Today's was 80/63 and they have been averaging 90/63's and highest has been 108/72. Patient states he has been dizzy and is noticing a dip in his pulse. Call back is [phone]

## 2018-07-18 NOTE — TELEPHONE ENCOUNTER
After calling patient to verify what medications he was holding and what medication he was placed on, he also stated that he has been feeling like he is going to pass out. Verbal orders per Solo Jackson PA-C we will increase his florinef on Monday, Wednesday and Friday. Patient was instructed to keep an eye out for shortness of breath and edema. If these occur he is to go down to daily and call us immediately. Patient was advised to continue his blood pressure log and to call us with any issues. He was also advised to call us on wednesday with a symptom check.

## 2018-07-26 ENCOUNTER — TELEPHONE (OUTPATIENT)
Dept: CARDIOLOGY | Facility: CLINIC | Age: 53
End: 2018-07-26

## 2018-07-26 NOTE — TELEPHONE ENCOUNTER
PATIENT CALLING WITH UPDATE ON B/P'S SINCE STARTING FLORINEF 0.1 MG DAILY ON 7-12-18. B/P'S HAVE BEEN RUNNING 110-115/60'S, LOWEST SYST. IS 98. STILL WITH SOME MILD DIZZINESS BUT IT IS MUCH BETTER. STATES HAD BACK SURGERY TODAY. HE IS NILA CONTINUE TO MONITOR HIS B/PA ND SX'S AND CALL THE OFFICE BACK WITH CONCERNS. VERBALIZED HE WOULD. [place] FRAGOSO        ----- Message from Ravinder Rodriguez sent at 7/26/2018  1:31 PM EDT -----  PT REQUESTED TO TALK TO YOU. PLEASE CALL

## 2018-07-30 DIAGNOSIS — I42.9 CARDIOMYOPATHY, UNSPECIFIED TYPE (HCC): ICD-10-CM

## 2018-07-30 DIAGNOSIS — R06.02 SHORTNESS OF BREATH: ICD-10-CM

## 2018-07-30 DIAGNOSIS — R60.9 EDEMA, UNSPECIFIED TYPE: ICD-10-CM

## 2018-07-30 RX ORDER — FUROSEMIDE 40 MG/1
40 TABLET ORAL DAILY
Qty: 30 TABLET | Refills: 11 | Status: SHIPPED | OUTPATIENT
Start: 2018-07-30 | End: 2019-02-13 | Stop reason: SDUPTHER

## 2018-08-24 ENCOUNTER — OFFICE VISIT (OUTPATIENT)
Dept: CARDIOLOGY | Facility: CLINIC | Age: 53
End: 2018-08-24

## 2018-08-24 DIAGNOSIS — I42.0 DILATED CARDIOMYOPATHY (HCC): Primary | ICD-10-CM

## 2018-08-24 PROCEDURE — 93289 INTERROG DEVICE EVAL HEART: CPT | Performed by: INTERNAL MEDICINE

## 2018-08-31 RX ORDER — SOTALOL HYDROCHLORIDE 80 MG/1
TABLET ORAL
Qty: 60 TABLET | Refills: 6 | Status: SHIPPED | OUTPATIENT
Start: 2018-08-31 | End: 2019-02-17 | Stop reason: SDUPTHER

## 2018-09-04 DIAGNOSIS — I42.9 CARDIOMYOPATHY, UNSPECIFIED TYPE (HCC): ICD-10-CM

## 2018-09-04 DIAGNOSIS — R06.02 SHORTNESS OF BREATH: ICD-10-CM

## 2018-09-04 DIAGNOSIS — R60.9 EDEMA, UNSPECIFIED TYPE: ICD-10-CM

## 2018-09-04 RX ORDER — POTASSIUM CHLORIDE 750 MG/1
10 TABLET, FILM COATED, EXTENDED RELEASE ORAL DAILY
Qty: 30 TABLET | Refills: 5 | Status: SHIPPED | OUTPATIENT
Start: 2018-09-04 | End: 2019-05-24 | Stop reason: SDUPTHER

## 2018-10-10 ENCOUNTER — TELEPHONE (OUTPATIENT)
Dept: CARDIOLOGY | Facility: CLINIC | Age: 53
End: 2018-10-10

## 2018-10-10 NOTE — TELEPHONE ENCOUNTER
Patient called concerning scheduling an appointment with Dr. Kearney. I tried to call him back. No answer. I left a message.

## 2018-11-19 RX ORDER — WARFARIN SODIUM 5 MG/1
5 TABLET ORAL
Qty: 30 TABLET | Refills: 11 | Status: SHIPPED | OUTPATIENT
Start: 2018-11-19 | End: 2020-06-09 | Stop reason: SDUPTHER

## 2018-12-11 ENCOUNTER — DOCUMENTATION (OUTPATIENT)
Dept: CARDIOLOGY | Facility: CLINIC | Age: 53
End: 2018-12-11

## 2018-12-11 NOTE — PROGRESS NOTES
Cardiac clearance req was received in office from . This was reviewed by Devaughn Terrazas PA-C and [facility] back[facility]

## 2018-12-26 ENCOUNTER — DOCUMENTATION (OUTPATIENT)
Dept: CARDIOLOGY | Facility: CLINIC | Age: 53
End: 2018-12-26

## 2018-12-26 NOTE — PROGRESS NOTES
Cardiac clearance req was received in office from  [facility]. This was reviewed by Devaughn Terrazas PA-C and faxed back. [facility]

## 2019-01-08 ENCOUNTER — DOCUMENTATION (OUTPATIENT)
Dept: CARDIOLOGY | Facility: CLINIC | Age: 54
End: 2019-01-08

## 2019-01-08 NOTE — PROGRESS NOTES
Anne-Marie from  [facility] called req call back. Called back at 1118 AM, she wanted to know if patient can HOLD ASA and Coumadin for SCS. Discussed with provider Devaughn Terrazas PA-C , chart was reviewed again. Verbal orders were given that patient may HOLD ASA and Coumadin 5 days prior to procedure. Anne-Marie was notified , clearance was addend and faxed back. -;[facility]

## 2019-01-25 ENCOUNTER — OFFICE VISIT (OUTPATIENT)
Dept: CARDIOLOGY | Facility: CLINIC | Age: 54
End: 2019-01-25

## 2019-01-25 DIAGNOSIS — I42.0 DILATED CARDIOMYOPATHY (HCC): Primary | ICD-10-CM

## 2019-01-25 PROCEDURE — 93289 INTERROG DEVICE EVAL HEART: CPT | Performed by: INTERNAL MEDICINE

## 2019-01-28 ENCOUNTER — OFFICE VISIT (OUTPATIENT)
Dept: CARDIOLOGY | Facility: CLINIC | Age: 54
End: 2019-01-28

## 2019-01-28 VITALS
HEIGHT: 75 IN | OXYGEN SATURATION: 96 % | WEIGHT: 217.4 LBS | BODY MASS INDEX: 27.03 KG/M2 | DIASTOLIC BLOOD PRESSURE: 75 MMHG | HEART RATE: 70 BPM | SYSTOLIC BLOOD PRESSURE: 111 MMHG

## 2019-01-28 DIAGNOSIS — R00.2 PALPITATIONS: Primary | ICD-10-CM

## 2019-01-28 DIAGNOSIS — R06.02 SOB (SHORTNESS OF BREATH): ICD-10-CM

## 2019-01-28 DIAGNOSIS — I42.9 CARDIOMYOPATHY, UNSPECIFIED TYPE (HCC): ICD-10-CM

## 2019-01-28 DIAGNOSIS — Z79.899 ENCOUNTER FOR MONITORING SOTALOL THERAPY: ICD-10-CM

## 2019-01-28 DIAGNOSIS — R55 SYNCOPE, UNSPECIFIED SYNCOPE TYPE: ICD-10-CM

## 2019-01-28 DIAGNOSIS — Z51.81 ENCOUNTER FOR MONITORING SOTALOL THERAPY: ICD-10-CM

## 2019-01-28 PROCEDURE — 93000 ELECTROCARDIOGRAM COMPLETE: CPT | Performed by: PHYSICIAN ASSISTANT

## 2019-01-28 PROCEDURE — 99213 OFFICE O/P EST LOW 20 MIN: CPT | Performed by: PHYSICIAN ASSISTANT

## 2019-01-28 RX ORDER — CARVEDILOL 6.25 MG/1
6.25 TABLET ORAL 2 TIMES DAILY
Qty: 60 TABLET | Refills: 11 | Status: SHIPPED | OUTPATIENT
Start: 2019-01-28 | End: 2020-09-17 | Stop reason: SDUPTHER

## 2019-01-28 RX ORDER — SIMVASTATIN 20 MG
20 TABLET ORAL NIGHTLY
Qty: 30 TABLET | Refills: 11 | Status: SHIPPED | OUTPATIENT
Start: 2019-01-28 | End: 2019-05-24 | Stop reason: SDUPTHER

## 2019-01-28 NOTE — PROGRESS NOTES
Problem list     Subjective   Bill Umana is a 53 y.o. male     Chief Complaint   Patient presents with   • Follow-up     presents for 6 month f/u   • Palpitations   • Shortness of Breath   • Loss of Consciousness   • Cardiomyopathy       HPI      Problem list  1.  History of nonischemic dilated cardiomyopathy status post AICD implant ([employer])   1.1 recent interrogation demonstrates 4 episodes of nonsustained ventricular tachycardia and one episode of ventricular tachycardia converted with ATP without defibrillation  1.2 patient on Sotalol for ventricular tachycardia followed by EP services  2.  Chronic systolic heart failure  2.1 EF estimated at 35-40% by echocardiogram February 2018  2.2 EF estimated in the low 40s post stress December 2018  2.3 EF by ventriculography during catheterization March 2018 at 40-45%  3.  Atrial fibrillation on Coumadin therapy  4.  Dyslipidemia  5.  GERD  6.  Mild mitral and tricuspid regurgitation  7.  Cardiac catheterization March 2018 with normal coronaries and an EF estimated at 4045%      Patient is a 53-year-old male that presents back to the office for follow-up.  Patient describes that over the last few weeks he has had syncopal episodes.  These syncopal episodes did not seem positional.  Patient will have a sensation of lightheadedness feel weak and then have syncope.  He will occasional chest tightness that is not always the case presyncope.  Last Friday he had AICD interrogated which was normal.  Patient has noted that his blood pressure is been really well.  Looking over the last few visits here his systolic is barely been over 100 and he describes that being low at home.    Otherwise, he has no chest discomfort.  Shortness of breath is moderate but nothing progressing.  No PND orthopnea.  He doesn't notice any palpitations.  He otherwise is feeling quite well      Outpatient Encounter Medications as of 1/28/2019   Medication Sig Dispense Refill   • aspirin 81  MG tablet Take 1 tablet by mouth Daily. 30 tablet 11   • busPIRone (BUSPAR) 10 MG tablet Take 10 mg by mouth 2 (Two) Times a Day.     • dicyclomine (BENTYL) 10 MG capsule Take 1 capsule by mouth Daily. 30 capsule 11   • fludrocortisone 0.1 MG tablet Take 1 tablet by mouth Daily. 30 tablet 5   • FLUOXETINE HCL PO Take 40 mg by mouth Daily.     • Fluticasone Furoate-Vilanterol (BREO ELLIPTA IN) Inhale 2 puffs Daily As Needed.     • furosemide (LASIX) 40 MG tablet Take 1 tablet by mouth Daily. 30 tablet 11   • gabapentin (NEURONTIN) 300 MG capsule 2 (Two) Times a Day.  2   • ibuprofen (ADVIL,MOTRIN) 800 MG tablet prn     • methocarbamol (ROBAXIN) 750 MG tablet 3 (Three) Times a Day.  1   • nitroglycerin (NITROSTAT) 0.4 MG SL tablet 1 under the tongue as needed for angina, may repeat q5mins for up three doses 100 tablet 11   • pantoprazole (PROTONIX) 40 MG EC tablet Take 1 tablet by mouth Daily. 30 tablet 11   • potassium chloride (K-DUR) 10 MEQ CR tablet Take 1 tablet by mouth Daily. 30 tablet 5   • sacubitril-valsartan (ENTRESTO) 24-26 MG tablet Take 1 tablet by mouth Every 12 (Twelve) Hours. 60 tablet 6   • simvastatin (ZOCOR) 20 MG tablet Take 1 tablet by mouth Every Night. 30 tablet 11   • sotalol (BETAPACE) 80 MG tablet TAKE 1 TABLET BY MOUTH TWICE A DAY 60 tablet 6   • traZODone (DESYREL) 100 MG tablet Take 200 mg by mouth Every Night.     • warfarin (COUMADIN) 5 MG tablet Take 1 tablet by mouth Daily. PCP follows.  Patient takes 7.5 mg 3 x week and 5 mg all other day's 30 tablet 11   • [DISCONTINUED] carvedilol (COREG) 12.5 MG tablet Take 1 tablet by mouth 2 (Two) Times a Day. 60 tablet 5   • [DISCONTINUED] simvastatin (ZOCOR) 20 MG tablet Take 20 mg by mouth Every Night.     • carvedilol (COREG) 6.25 MG tablet Take 1 tablet by mouth 2 (Two) Times a Day. 60 tablet 11     No facility-administered encounter medications on file as of 1/28/2019.        Patient has no known allergies.    Past Medical History:    Diagnosis Date   • Atrial fibrillation (CMS/HCC)    • Hyperlipidemia    • Hypertension    • ICD (implantable cardioverter-defibrillator) in place        Social History     Socioeconomic History   • Marital status:      Spouse name: Not on file   • Number of children: Not on file   • Years of education: Not on file   • Highest education level: Not on file   Social Needs   • Financial resource strain: Not on file   • Food insecurity - worry: Not on file   • Food insecurity - inability: Not on file   • Transportation needs - medical: Not on file   • Transportation needs - non-medical: Not on file   Occupational History   • Not on file   Tobacco Use   • Smoking status: Never Smoker   • Smokeless tobacco: Never Used   Substance and Sexual Activity   • Alcohol use: No   • Drug use: No   • Sexual activity: Defer   Other Topics Concern   • Not on file   Social History Narrative   • Not on file       Family History   Problem Relation Age of Onset   • No Known Problems Mother    • No Known Problems Father        Review of Systems   Constitutional: Positive for diaphoresis (night sweats) and fatigue ( more the last few weeks ).   Eyes: Positive for visual disturbance (wears glasses).   Respiratory: Positive for chest tightness and shortness of breath (with day to day activity and times at rest).    Cardiovascular: Positive for palpitations (occas racing). Negative for chest pain and leg swelling.   Gastrointestinal: Negative.    Endocrine: Negative.    Genitourinary: Negative.    Musculoskeletal: Positive for back pain.   Skin: Negative.    Allergic/Immunologic: Negative.    Neurological: Positive for dizziness (more the last few weeks; orthos negative), syncope (last episode 3 days ago), light-headedness and headaches (migraines).   Hematological: Negative.    Psychiatric/Behavioral: Positive for agitation. The patient is nervous/anxious.    All other systems reviewed and are negative.      Objective   Vitals:     "01/28/19 0951 01/28/19 0952 01/28/19 0953   BP: 114/70 115/76 111/75   BP Location: Left arm Left arm Left arm   Patient Position: Lying Sitting Standing   Pulse: 56 58 70   SpO2: 96%     Weight: 98.6 kg (217 lb 6.4 oz)     Height: 190.5 cm (75\")        /75 (BP Location: Left arm, Patient Position: Standing)   Pulse 70   Ht 190.5 cm (75\")   Wt 98.6 kg (217 lb 6.4 oz)   SpO2 96%   BMI 27.17 kg/m²     Lab Results (most recent)     None          Physical Exam   Constitutional: He is oriented to person, place, and time. He appears well-developed and well-nourished. No distress.   HENT:   Head: Normocephalic and atraumatic.   Eyes: EOM are normal. Pupils are equal, round, and reactive to light.   Neck: No JVD present.   Cardiovascular: Normal rate, regular rhythm, normal heart sounds and intact distal pulses. Exam reveals no gallop and no friction rub.   No murmur heard.  Pulmonary/Chest: Effort normal and breath sounds normal. No respiratory distress. He has no wheezes. He has no rales. He exhibits no tenderness.   Musculoskeletal: Normal range of motion. He exhibits no edema.   Neurological: He is alert and oriented to person, place, and time. No cranial nerve deficit.   Skin: Skin is warm and dry. No rash noted. No erythema. No pallor.   Psychiatric: He has a normal mood and affect. His behavior is normal.   Nursing note and vitals reviewed.      Procedure     ECG 12 Lead  Date/Time: 1/28/2019 10:04 AM  Performed by: Devaughn Terrazas PA  Authorized by: Devaughn Terrazas PA   Comments: EKG demonstrates sinus bradycardia 56 bpm, normal axis, septal wall MI treated time, no acute ST changes               Assessment/Plan     Problems Addressed this Visit        Cardiovascular and Mediastinum    Cardiomyopathy (CMS/HCC)    Relevant Medications    carvedilol (COREG) 6.25 MG tablet    Other Relevant Orders    ECG 12 Lead    Palpitations - Primary    Relevant Orders    ECG 12 Lead    Syncope    Relevant Orders "    ECG 12 Lead       Respiratory    SOB (shortness of breath)    Relevant Orders    ECG 12 Lead      Other Visit Diagnoses     Encounter for monitoring sotalol therapy        Relevant Orders    ECG 12 Lead            Recommendation  1.  Patient syncopal episodes are likely related to his hypotension.  AICD did not demonstrate any arrhythmias as a cause.  He had normal corneas by catheterization in March 2018 with an EF of approximately 40-45%.  With significant hypotension, would like to decrease his carvedilol to 6.25 twice a day.  He will monitor blood pressures and he will call back in one week with symptoms check and I would like to see him officially back in 3-4 weeks.  2 patient doing well otherwise we will see back for follow-up in a few weeks.  Follow-up primary as scheduled             Body mass index is 27.17 kg/m². BMI is within normal parameters. No follow-up required..       Electronically signed by:

## 2019-02-13 ENCOUNTER — OFFICE VISIT (OUTPATIENT)
Dept: CARDIOLOGY | Facility: CLINIC | Age: 54
End: 2019-02-13

## 2019-02-13 VITALS
OXYGEN SATURATION: 98 % | DIASTOLIC BLOOD PRESSURE: 82 MMHG | HEIGHT: 75 IN | SYSTOLIC BLOOD PRESSURE: 121 MMHG | HEART RATE: 60 BPM | BODY MASS INDEX: 27.3 KG/M2 | WEIGHT: 219.6 LBS

## 2019-02-13 DIAGNOSIS — I42.9 CARDIOMYOPATHY, UNSPECIFIED TYPE (HCC): ICD-10-CM

## 2019-02-13 DIAGNOSIS — R06.02 SHORTNESS OF BREATH: ICD-10-CM

## 2019-02-13 DIAGNOSIS — R60.9 EDEMA, UNSPECIFIED TYPE: ICD-10-CM

## 2019-02-13 DIAGNOSIS — I95.1 ORTHOSTATIC HYPOTENSION: Primary | ICD-10-CM

## 2019-02-13 PROCEDURE — 99213 OFFICE O/P EST LOW 20 MIN: CPT | Performed by: PHYSICIAN ASSISTANT

## 2019-02-13 RX ORDER — FUROSEMIDE 40 MG/1
40 TABLET ORAL DAILY
Qty: 90 TABLET | Refills: 3 | Status: SHIPPED | OUTPATIENT
Start: 2019-02-13 | End: 2019-08-16 | Stop reason: SDUPTHER

## 2019-02-13 NOTE — PROGRESS NOTES
"Problem list     Subjective   Bill Umana is a 53 y.o. male     Chief Complaint   Patient presents with   • Follow-up     presents for 2 week f/u   • Palpitations   • Cardiomyopathy   • Dizziness       HPI         Problem list  1.  History of nonischemic dilated cardiomyopathy status post AICD implant ([employer])   1.1 recent interrogation demonstrates 4 episodes of nonsustained ventricular tachycardia and one episode of ventricular tachycardia converted with ATP without defibrillation  1.2 patient on Sotalol for ventricular tachycardia followed by EP services  2.  Chronic systolic heart failure  2.1 EF estimated at 35-40% by echocardiogram February 2018  2.2 EF estimated in the low 40s post stress December 2018  2.3 EF by ventriculography during catheterization March 2018 at 40-45%  3.  Atrial fibrillation on Coumadin therapy  4.  Dyslipidemia  5.  GERD  6.  Mild mitral and tricuspid regurgitation  7.  Cardiac catheterization March 2018 with normal coronaries and an EF estimated at 4045%         Patient is a 53-year-old male that presents back to the office for follow-up.  Patient has been experiencing symptoms of presyncope.  Last office visit, we had AICD interrogated.  No arrhythmias were noted.  Patient's EF is estimated at 40-45%.  However, he had significant hypotension.  We decrease Coreg to 6.25 twice a day to see if this will help.    Patient does note improvement.  He still have occasional lightheadedness but does not describe presyncope.  He has no chest pain other than occasional \"twinges\" that occurs at random.  Shortness of breath is moderate at baseline but not progressive shortness of breath.  No PND orthopnea.    Otherwise patient is doing well    Outpatient Encounter Medications as of 2/13/2019   Medication Sig Dispense Refill   • aspirin 81 MG tablet Take 1 tablet by mouth Daily. 30 tablet 11   • busPIRone (BUSPAR) 10 MG tablet Take 10 mg by mouth 2 (Two) Times a Day.     • carvedilol " (COREG) 6.25 MG tablet Take 1 tablet by mouth 2 (Two) Times a Day. 60 tablet 11   • dicyclomine (BENTYL) 10 MG capsule Take 1 capsule by mouth Daily. 30 capsule 11   • fludrocortisone 0.1 MG tablet Take 1 tablet by mouth Daily. 30 tablet 5   • FLUOXETINE HCL PO Take 40 mg by mouth Daily.     • Fluticasone Furoate-Vilanterol (BREO ELLIPTA IN) Inhale 2 puffs Daily As Needed.     • furosemide (LASIX) 40 MG tablet Take 1 tablet by mouth Daily. 90 tablet 3   • gabapentin (NEURONTIN) 300 MG capsule 2 (Two) Times a Day.  2   • ibuprofen (ADVIL,MOTRIN) 800 MG tablet prn     • methocarbamol (ROBAXIN) 750 MG tablet 3 (Three) Times a Day.  1   • nitroglycerin (NITROSTAT) 0.4 MG SL tablet 1 under the tongue as needed for angina, may repeat q5mins for up three doses 100 tablet 11   • pantoprazole (PROTONIX) 40 MG EC tablet Take 1 tablet by mouth Daily. 30 tablet 11   • potassium chloride (K-DUR) 10 MEQ CR tablet Take 1 tablet by mouth Daily. 30 tablet 5   • sacubitril-valsartan (ENTRESTO) 24-26 MG tablet Take 1 tablet by mouth Every 12 (Twelve) Hours. 60 tablet 6   • simvastatin (ZOCOR) 20 MG tablet Take 1 tablet by mouth Every Night. 30 tablet 11   • sotalol (BETAPACE) 80 MG tablet TAKE 1 TABLET BY MOUTH TWICE A DAY 60 tablet 6   • traZODone (DESYREL) 100 MG tablet Take 200 mg by mouth Every Night.     • warfarin (COUMADIN) 5 MG tablet Take 1 tablet by mouth Daily. PCP follows.  Patient takes 7.5 mg 3 x week and 5 mg all other day's 30 tablet 11   • [DISCONTINUED] furosemide (LASIX) 40 MG tablet Take 1 tablet by mouth Daily. 30 tablet 11     No facility-administered encounter medications on file as of 2/13/2019.        Patient has no known allergies.    Past Medical History:   Diagnosis Date   • Atrial fibrillation (CMS/HCC)    • Hyperlipidemia    • Hypertension    • ICD (implantable cardioverter-defibrillator) in place        Social History     Socioeconomic History   • Marital status:      Spouse name: Not on file   •  "Number of children: Not on file   • Years of education: Not on file   • Highest education level: Not on file   Social Needs   • Financial resource strain: Not on file   • Food insecurity - worry: Not on file   • Food insecurity - inability: Not on file   • Transportation needs - medical: Not on file   • Transportation needs - non-medical: Not on file   Occupational History   • Not on file   Tobacco Use   • Smoking status: Never Smoker   • Smokeless tobacco: Never Used   Substance and Sexual Activity   • Alcohol use: No   • Drug use: No   • Sexual activity: Defer   Other Topics Concern   • Not on file   Social History Narrative   • Not on file       Family History   Problem Relation Age of Onset   • No Known Problems Mother    • No Known Problems Father        Review of Systems   Constitutional: Positive for diaphoresis (night sweats) and fatigue.   HENT: Negative.    Eyes: Positive for visual disturbance (wears glasses).   Respiratory: Positive for shortness of breath (on exertion).    Cardiovascular: Positive for palpitations. Negative for chest pain and leg swelling.   Gastrointestinal: Negative.    Endocrine: Negative.    Genitourinary: Negative.    Musculoskeletal: Negative.    Skin: Negative.    Allergic/Immunologic: Negative.    Neurological: Positive for dizziness.   Hematological: Bruises/bleeds easily.   Psychiatric/Behavioral: The patient is nervous/anxious.    All other systems reviewed and are negative.      Objective   Vitals:    02/13/19 0844   BP: 121/82   BP Location: Left arm   Patient Position: Sitting   Pulse: 60   SpO2: 98%   Weight: 99.6 kg (219 lb 9.6 oz)   Height: 190.5 cm (75\")      /82 (BP Location: Left arm, Patient Position: Sitting)   Pulse 60   Ht 190.5 cm (75\")   Wt 99.6 kg (219 lb 9.6 oz)   SpO2 98%   BMI 27.45 kg/m²     Lab Results (most recent)     None          Physical Exam   Constitutional: He is oriented to person, place, and time. He appears well-developed and " well-nourished. No distress.   HENT:   Head: Normocephalic and atraumatic.   Eyes: EOM are normal. Pupils are equal, round, and reactive to light.   Neck: No JVD present.   Cardiovascular: Normal rate, regular rhythm, normal heart sounds and intact distal pulses. Exam reveals no gallop and no friction rub.   No murmur heard.  Pulmonary/Chest: Effort normal and breath sounds normal. No respiratory distress. He has no wheezes. He has no rales. He exhibits no tenderness.   Musculoskeletal: Normal range of motion. He exhibits no edema.   Neurological: He is alert and oriented to person, place, and time. No cranial nerve deficit.   Skin: Skin is warm and dry. No rash noted. No erythema. No pallor.   Psychiatric: He has a normal mood and affect. His behavior is normal.   Nursing note and vitals reviewed.      Procedure   Procedures       Assessment/Plan     Problems Addressed this Visit        Cardiovascular and Mediastinum    Cardiomyopathy (CMS/HCC)    Relevant Medications    furosemide (LASIX) 40 MG tablet    Orthostatic hypotension - Primary       Respiratory    Shortness of breath    Relevant Medications    furosemide (LASIX) 40 MG tablet      Other Visit Diagnoses     Edema, unspecified type        Relevant Medications    furosemide (LASIX) 40 MG tablet            Recommendation  1.  Patient with cardiomyopathy but recent evaluation shows an EF of approximately 40-45%.  On appropriate medications and will continue.  2.  Patient's orthostatic symptoms have improved.  We will continue current dosing.  If symptoms worsen or change in any way, he will contact our office.  3.  Otherwise, patient is euvolemic by examination and dyspnea is stable.  We will see back for follow-up in 6 months or sooner symptoms discussed.  He will follow-up primary as scheduled            Patient's Body mass index is 27.45 kg/m². BMI is within normal parameters. No follow-up required..       Electronically signed by:

## 2019-02-18 RX ORDER — SOTALOL HYDROCHLORIDE 80 MG/1
TABLET ORAL
Qty: 60 TABLET | Refills: 11 | Status: SHIPPED | OUTPATIENT
Start: 2019-02-18 | End: 2019-05-24 | Stop reason: SDUPTHER

## 2019-04-26 ENCOUNTER — OFFICE VISIT (OUTPATIENT)
Dept: CARDIOLOGY | Facility: CLINIC | Age: 54
End: 2019-04-26

## 2019-04-26 DIAGNOSIS — I42.0 DILATED CARDIOMYOPATHY (HCC): Primary | ICD-10-CM

## 2019-04-26 PROCEDURE — 93289 INTERROG DEVICE EVAL HEART: CPT | Performed by: INTERNAL MEDICINE

## 2019-05-08 ENCOUNTER — TELEPHONE (OUTPATIENT)
Dept: CARDIOLOGY | Facility: CLINIC | Age: 54
End: 2019-05-08

## 2019-05-08 NOTE — TELEPHONE ENCOUNTER
CARDIAC CLEARANCE LETTER FAXED.  LISA,LACY    FAX RECEIVED FROM [facility] REQUESTING CARDIAC CLEARANCE FOR EGD& COLONOSCOPY SCHEDULED 5/30/19.REQUEST WAITING REVIEW WITH MAURICIO LOPEZ PA-C.

## 2019-05-09 NOTE — TELEPHONE ENCOUNTER
5/9/19 Rec'd a 2nd request for cardiac clearance. This is still pending and is on desk for Solo Jackson to review.  , PARDEEPA

## 2019-05-22 ENCOUNTER — TELEPHONE (OUTPATIENT)
Dept: CARDIOLOGY | Facility: CLINIC | Age: 54
End: 2019-05-22

## 2019-05-22 NOTE — TELEPHONE ENCOUNTER
5/21/19  Rec'd a faxed request from [facility] for a cardiac clearance.  Pt will be having a permanent spinal cord stimulator implanted on 6/6/19.  Request placed on Devaughn Terrazas PA-C's desk for review.  JIA HURTADO

## 2019-05-24 DIAGNOSIS — R06.02 SHORTNESS OF BREATH: ICD-10-CM

## 2019-05-24 DIAGNOSIS — I42.9 CARDIOMYOPATHY, UNSPECIFIED TYPE (HCC): ICD-10-CM

## 2019-05-24 DIAGNOSIS — R60.9 EDEMA, UNSPECIFIED TYPE: ICD-10-CM

## 2019-05-24 RX ORDER — SIMVASTATIN 20 MG
20 TABLET ORAL NIGHTLY
Qty: 90 TABLET | Refills: 3 | Status: SHIPPED | OUTPATIENT
Start: 2019-05-24 | End: 2020-05-07

## 2019-05-24 RX ORDER — SOTALOL HYDROCHLORIDE 80 MG/1
80 TABLET ORAL 2 TIMES DAILY
Qty: 180 TABLET | Refills: 3 | Status: SHIPPED | OUTPATIENT
Start: 2019-05-24 | End: 2019-05-28 | Stop reason: SDUPTHER

## 2019-05-24 RX ORDER — PANTOPRAZOLE SODIUM 40 MG/1
40 TABLET, DELAYED RELEASE ORAL DAILY
Qty: 90 TABLET | Refills: 3 | Status: SHIPPED | OUTPATIENT
Start: 2019-05-24 | End: 2020-06-25

## 2019-05-24 RX ORDER — POTASSIUM CHLORIDE 750 MG/1
10 TABLET, FILM COATED, EXTENDED RELEASE ORAL DAILY
Qty: 90 TABLET | Refills: 3 | Status: SHIPPED | OUTPATIENT
Start: 2019-05-24 | End: 2021-05-28 | Stop reason: SDUPTHER

## 2019-05-28 RX ORDER — SOTALOL HYDROCHLORIDE 80 MG/1
80 TABLET ORAL 2 TIMES DAILY
Qty: 180 TABLET | Refills: 3 | Status: SHIPPED | OUTPATIENT
Start: 2019-05-28 | End: 2019-08-22 | Stop reason: SDUPTHER

## 2019-05-29 NOTE — TELEPHONE ENCOUNTER
5/29/19  CARDIAC CLEARANCE LETTER FAXED TO [facility] PER MAURICIO LOPEZ PA-C.  LISA,CMA

## 2019-05-31 NOTE — TELEPHONE ENCOUNTER
Dr. Villa's office requesting patient cleared to d/c ASA for upcoming surgery.  I advised to re-submit via fax request for Solo Jackson PA-C to re-consider and determine.  LISA,LACY

## 2019-06-04 ENCOUNTER — TELEPHONE (OUTPATIENT)
Dept: CARDIOLOGY | Facility: CLINIC | Age: 54
End: 2019-06-04

## 2019-06-04 NOTE — TELEPHONE ENCOUNTER
"Patient calling about the status of cardiac clearance.  I let patient know it was being reviewed by Devaughn Terrazas PA-C.  \"s office will contact him when they receive the clearance.  Patient verbalized understanding.  LACY GONZALEZ  "

## 2019-06-18 ENCOUNTER — PRIOR AUTHORIZATION (OUTPATIENT)
Dept: CARDIOLOGY | Facility: CLINIC | Age: 54
End: 2019-06-18

## 2019-06-25 NOTE — TELEPHONE ENCOUNTER
Fax received from Dr. Castellanos office requesting cardiac clearance for revision of Bilateral mastectomy scars, scheduled 7/1/19. On the desk of Devaughn Terrazas PA-C for review.  LISA,LACY

## 2019-07-26 ENCOUNTER — OFFICE VISIT (OUTPATIENT)
Dept: CARDIOLOGY | Facility: CLINIC | Age: 54
End: 2019-07-26

## 2019-07-26 DIAGNOSIS — I42.0 DILATED CARDIOMYOPATHY (HCC): Primary | ICD-10-CM

## 2019-07-26 PROCEDURE — 93283 PRGRMG EVAL IMPLANTABLE DFB: CPT | Performed by: INTERNAL MEDICINE

## 2019-08-16 DIAGNOSIS — I42.9 CARDIOMYOPATHY, UNSPECIFIED TYPE (HCC): ICD-10-CM

## 2019-08-16 DIAGNOSIS — R60.9 EDEMA, UNSPECIFIED TYPE: ICD-10-CM

## 2019-08-16 DIAGNOSIS — R06.02 SHORTNESS OF BREATH: ICD-10-CM

## 2019-08-16 RX ORDER — FUROSEMIDE 40 MG/1
40 TABLET ORAL DAILY
Qty: 90 TABLET | Refills: 0 | Status: SHIPPED | OUTPATIENT
Start: 2019-08-16 | End: 2019-10-28 | Stop reason: SDUPTHER

## 2019-08-21 ENCOUNTER — TELEPHONE (OUTPATIENT)
Dept: CARDIOLOGY | Facility: CLINIC | Age: 54
End: 2019-08-21

## 2019-08-21 NOTE — TELEPHONE ENCOUNTER
Patient is concerned about heart rate during exercise yesterday his HR was 190, he became nauseated and had some chest pain and SOB.  He states this morning he feels slightly nauseas and resting HR 80, which usually is 65.  He has pacemaker/defribullator.  His last pacemaker check was in July.  He has a follow up in October.

## 2019-08-21 NOTE — TELEPHONE ENCOUNTER
Per Devaughn Terrazas PA-C contacted Amelia Aris with [employer] scheduled patient for Pacemaker check, 8/22/19 @09:00 , he also wants a nurse visit scheduled for vital check and review findings of pacemaker check.  Sent to Front Office Pool to schedule.  LACY GONZALEZ

## 2019-08-22 ENCOUNTER — CLINICAL SUPPORT (OUTPATIENT)
Dept: CARDIOLOGY | Facility: CLINIC | Age: 54
End: 2019-08-22

## 2019-08-22 VITALS
HEART RATE: 50 BPM | HEIGHT: 75 IN | BODY MASS INDEX: 27.98 KG/M2 | DIASTOLIC BLOOD PRESSURE: 78 MMHG | WEIGHT: 225 LBS | SYSTOLIC BLOOD PRESSURE: 113 MMHG | OXYGEN SATURATION: 96 %

## 2019-08-22 DIAGNOSIS — R06.02 SOB (SHORTNESS OF BREATH): ICD-10-CM

## 2019-08-22 DIAGNOSIS — I42.0 DILATED CARDIOMYOPATHY (HCC): Primary | ICD-10-CM

## 2019-08-22 DIAGNOSIS — R00.2 PALPITATIONS: ICD-10-CM

## 2019-08-22 PROCEDURE — 93000 ELECTROCARDIOGRAM COMPLETE: CPT | Performed by: PHYSICIAN ASSISTANT

## 2019-08-22 PROCEDURE — 93283 PRGRMG EVAL IMPLANTABLE DFB: CPT | Performed by: INTERNAL MEDICINE

## 2019-08-22 RX ORDER — SOTALOL HYDROCHLORIDE 120 MG/1
120 TABLET ORAL 2 TIMES DAILY
Qty: 60 TABLET | Refills: 11 | Status: SHIPPED | OUTPATIENT
Start: 2019-08-22 | End: 2020-09-17 | Stop reason: SDUPTHER

## 2019-08-22 NOTE — PROGRESS NOTES
Bill Umana  1965 8/22/2019   ?   Chief Complaint   Patient presents with   • Cardiomyopathy     presents as nurse visit for vital check   • Palpitations      ?   HPI:   ?   ?   ? Patient presents as nurse visit for palpitations. Monday he noticed his heart rate go up to 190 while walking on the treadmill. When he's been at work the last two days his HR runs around 130-140. Normally his heart rate runs in the 50s. He states mild chest pain but typical due to cardiomyopathy and increased shortness of breath. Amelia with [employer] in office today for pacer interrogation. He stopped the Entresto 3 weeks ago due to cost. EKG done as ordered and to be reviewed by Devaughn Terrazas PA-C. Evelin Zhou MA        Current Outpatient Medications:   •  aspirin 81 MG tablet, Take 1 tablet by mouth Daily., Disp: 30 tablet, Rfl: 11  •  busPIRone (BUSPAR) 10 MG tablet, Take 10 mg by mouth 2 (Two) Times a Day., Disp: , Rfl:   •  carvedilol (COREG) 6.25 MG tablet, Take 1 tablet by mouth 2 (Two) Times a Day., Disp: 60 tablet, Rfl: 11  •  dicyclomine (BENTYL) 10 MG capsule, Take 1 capsule by mouth Daily., Disp: 30 capsule, Rfl: 11  •  fludrocortisone 0.1 MG tablet, Take 1 tablet by mouth Daily., Disp: 30 tablet, Rfl: 5  •  FLUOXETINE HCL PO, Take 40 mg by mouth Daily., Disp: , Rfl:   •  Fluticasone Furoate-Vilanterol (BREO ELLIPTA IN), Inhale 2 puffs Daily As Needed., Disp: , Rfl:   •  furosemide (LASIX) 40 MG tablet, Take 1 tablet by mouth Daily., Disp: 90 tablet, Rfl: 0  •  gabapentin (NEURONTIN) 300 MG capsule, 2 (Two) Times a Day., Disp: , Rfl: 2  •  ibuprofen (ADVIL,MOTRIN) 800 MG tablet, prn, Disp: , Rfl:   •  methocarbamol (ROBAXIN) 750 MG tablet, 3 (Three) Times a Day., Disp: , Rfl: 1  •  nitroglycerin (NITROSTAT) 0.4 MG SL tablet, 1 under the tongue as needed for angina, may repeat q5mins for up three doses, Disp: 100 tablet, Rfl: 11  •  pantoprazole (PROTONIX) 40 MG EC tablet, Take 1 tablet by mouth Daily.,  Disp: 90 tablet, Rfl: 3  •  potassium chloride (K-DUR) 10 MEQ CR tablet, Take 1 tablet by mouth Daily., Disp: 90 tablet, Rfl: 3  •  sacubitril-valsartan (ENTRESTO) 24-26 MG tablet, Take 1 tablet by mouth Every 12 (Twelve) Hours., Disp: 60 tablet, Rfl: 6  •  simvastatin (ZOCOR) 20 MG tablet, Take 1 tablet by mouth Every Night., Disp: 90 tablet, Rfl: 3  •  sotalol (BETAPACE) 80 MG tablet, Take 1 tablet by mouth 2 (Two) Times a Day., Disp: 180 tablet, Rfl: 3  •  traZODone (DESYREL) 100 MG tablet, Take 200 mg by mouth Every Night., Disp: , Rfl:   •  warfarin (COUMADIN) 5 MG tablet, Take 1 tablet by mouth Daily. PCP follows.  Patient takes 7.5 mg 3 x week and 5 mg all other day's, Disp: 30 tablet, Rfl: 11   ?   ?   Patient has no known allergies.         ECG 12 Lead  Date/Time: 8/22/2019 9:24 AM  Performed by: Devaughn Terrazas PA  Authorized by: Devaughn Terrazas PA   Comparison: not compared with previous ECG                ?   Assessment/Plan    ?   ?   ?  1. Palpitations      2. Cardiomypathy    EKG, vitals, symptoms and pacer interrogation reviewed by Devaughn Terrazas PA-C. Verbal order per Devaughn Terrazas PA-C to increase Sotalol to 120mg BID on Saturday with nurse visit for EKG on Monday. Verbal order read back and verified by Devaughn Terrazas PA-C. Patient verbalized understanding. Patient given samples of Entresto along with [employer] information for patient assistance. Changes made to pacer by Amelia to monitor zone at 160 bpm. Patient scheduled for device check in one month and EKG on Monday. He will call the office with any questions or concerns. Evelin Zhou MA

## 2019-08-26 ENCOUNTER — CLINICAL SUPPORT (OUTPATIENT)
Dept: CARDIOLOGY | Facility: CLINIC | Age: 54
End: 2019-08-26

## 2019-08-26 VITALS
OXYGEN SATURATION: 100 % | SYSTOLIC BLOOD PRESSURE: 108 MMHG | BODY MASS INDEX: 27.98 KG/M2 | WEIGHT: 225 LBS | HEART RATE: 64 BPM | DIASTOLIC BLOOD PRESSURE: 71 MMHG | HEIGHT: 75 IN

## 2019-08-26 DIAGNOSIS — Z79.899 ENCOUNTER FOR MONITORING SOTALOL THERAPY: ICD-10-CM

## 2019-08-26 DIAGNOSIS — Z51.81 ENCOUNTER FOR MONITORING SOTALOL THERAPY: ICD-10-CM

## 2019-08-26 DIAGNOSIS — R00.2 PALPITATIONS: Primary | ICD-10-CM

## 2019-08-26 PROCEDURE — 93000 ELECTROCARDIOGRAM COMPLETE: CPT | Performed by: PHYSICIAN ASSISTANT

## 2019-08-26 NOTE — PROGRESS NOTES
Bill Umana  1965 8/26/2019   ?   Chief Complaint   Patient presents with   • Palpitations     Presents as nurse visit for EKG      ?   HPI:   ?    Patient presents as nurse visit for EKG due to palpitations/tachycardia. He was seen on 8/22/19 for initial symptoms and pacer interrogation. Sotalol was increased to 120 mg BID with first dose on Saturday 8/24/19. He is tolerating the medications well. He still experiences palpitations and flucation in heart rate causing dizziness. He states while he is on the treadmill his heart rate will increase to mid 70s, once he is done exercising it will usually jump to 120 for a few minutes then drops to normal rate of mid 50s. He denies chest pain and shortness of breath. EKG done as ordered and to be reviewed by Devaughn ocampo PA-C. Evelin Zhou MA    ?     Current Outpatient Medications:   •  aspirin 81 MG tablet, Take 1 tablet by mouth Daily., Disp: 30 tablet, Rfl: 11  •  busPIRone (BUSPAR) 10 MG tablet, Take 10 mg by mouth 2 (Two) Times a Day., Disp: , Rfl:   •  carvedilol (COREG) 6.25 MG tablet, Take 1 tablet by mouth 2 (Two) Times a Day., Disp: 60 tablet, Rfl: 11  •  dicyclomine (BENTYL) 10 MG capsule, Take 1 capsule by mouth Daily., Disp: 30 capsule, Rfl: 11  •  fludrocortisone 0.1 MG tablet, Take 1 tablet by mouth Daily., Disp: 30 tablet, Rfl: 5  •  FLUOXETINE HCL PO, Take 40 mg by mouth Daily., Disp: , Rfl:   •  Fluticasone Furoate-Vilanterol (BREO ELLIPTA IN), Inhale 2 puffs Daily As Needed., Disp: , Rfl:   •  furosemide (LASIX) 40 MG tablet, Take 1 tablet by mouth Daily., Disp: 90 tablet, Rfl: 0  •  gabapentin (NEURONTIN) 300 MG capsule, 2 (Two) Times a Day., Disp: , Rfl: 2  •  ibuprofen (ADVIL,MOTRIN) 800 MG tablet, prn, Disp: , Rfl:   •  methocarbamol (ROBAXIN) 750 MG tablet, 3 (Three) Times a Day., Disp: , Rfl: 1  •  nitroglycerin (NITROSTAT) 0.4 MG SL tablet, 1 under the tongue as needed for angina, may repeat q5mins for up three doses, Disp: 100  tablet, Rfl: 11  •  pantoprazole (PROTONIX) 40 MG EC tablet, Take 1 tablet by mouth Daily., Disp: 90 tablet, Rfl: 3  •  potassium chloride (K-DUR) 10 MEQ CR tablet, Take 1 tablet by mouth Daily., Disp: 90 tablet, Rfl: 3  •  sacubitril-valsartan (ENTRESTO) 24-26 MG tablet, Take 1 tablet by mouth Every 12 (Twelve) Hours., Disp: 60 tablet, Rfl: 6  •  simvastatin (ZOCOR) 20 MG tablet, Take 1 tablet by mouth Every Night., Disp: 90 tablet, Rfl: 3  •  sotalol (BETAPACE) 120 MG tablet, Take 1 tablet by mouth 2 (Two) Times a Day., Disp: 60 tablet, Rfl: 11  •  traZODone (DESYREL) 100 MG tablet, Take 200 mg by mouth Every Night., Disp: , Rfl:   •  warfarin (COUMADIN) 5 MG tablet, Take 1 tablet by mouth Daily. PCP follows.  Patient takes 7.5 mg 3 x week and 5 mg all other day's, Disp: 30 tablet, Rfl: 11   ?   ?   Patient has no known allergies.         ECG 12 Lead  Date/Time: 8/26/2019 4:48 PM  Performed by: Devaughn Terrazas PA  Authorized by: Devaughn Terrazas PA   Comparison: not compared with previous ECG                ?   Assessment/Plan    ?   ?   ? 1. Palpitations    EKG, vitals and symptoms reviewed by Devaughn Terrazas PA-C. No new orders at this time. Patient will continue medications as ordered. He will call the office in one week with symptoms or sooner if symptoms persist or worsen. Pacer interrogation and office visit scheduled in one month. Evelin Zhou MA

## 2019-10-02 ENCOUNTER — OFFICE VISIT (OUTPATIENT)
Dept: CARDIOLOGY | Facility: CLINIC | Age: 54
End: 2019-10-02

## 2019-10-02 VITALS
HEIGHT: 75 IN | WEIGHT: 225 LBS | HEART RATE: 62 BPM | BODY MASS INDEX: 27.98 KG/M2 | OXYGEN SATURATION: 98 % | SYSTOLIC BLOOD PRESSURE: 117 MMHG | DIASTOLIC BLOOD PRESSURE: 80 MMHG

## 2019-10-02 DIAGNOSIS — I50.20 SYSTOLIC CONGESTIVE HEART FAILURE, UNSPECIFIED HF CHRONICITY (HCC): ICD-10-CM

## 2019-10-02 DIAGNOSIS — R06.02 SHORTNESS OF BREATH: Primary | ICD-10-CM

## 2019-10-02 DIAGNOSIS — I42.0 DILATED CARDIOMYOPATHY (HCC): ICD-10-CM

## 2019-10-02 DIAGNOSIS — I47.29 NONSUSTAINED VENTRICULAR TACHYCARDIA (HCC): ICD-10-CM

## 2019-10-02 PROCEDURE — 99214 OFFICE O/P EST MOD 30 MIN: CPT | Performed by: PHYSICIAN ASSISTANT

## 2019-10-02 NOTE — PROGRESS NOTES
Problem list     Subjective   Bill Umana is a 54 y.o. male     Chief Complaint   Patient presents with   • Cardiomyopathy     Here for a follow up    • Palpitations       HPI       Problem list  1.  History of nonischemic dilated cardiomyopathy status post AICD implant ([place] [employer])   1.1 recent interrogation demonstrates 4 episodes of nonsustained ventricular tachycardia and one episode of ventricular tachycardia converted with ATP without defibrillation  1.2 patient on Sotalol for ventricular tachycardia followed by EP services  2.  Chronic systolic heart failure  2.1 EF estimated at 35-40% by echocardiogram February 2018  2.2 EF estimated in the low 40s post stress December 2018  2.3 EF by ventriculography during catheterization March 2018 at 40-45%  3.  Atrial fibrillation on Coumadin therapy  4.  Dyslipidemia  5.  GERD  6.  Mild mitral and tricuspid regurgitation  7.  Cardiac catheterization March 2018 with normal coronaries and an EF estimated at 4045%     Patient is a 54-year-old male who presents back to the office for follow-up.  Last office visit we reviewed AICD interrogation demonstrating frequent PVCs and one nonsustained V. tach.  Sotalol was increased.  Patient notices improvement.  However, sometimes when he tries to exert he notices his heart rate increasing significantly.  Heart rates in the 130s and 140s.    Patient does feel better but notices tachycardia at times.  Shortness of breath is moderate.  He occasionally has chest tightness but this is stable.  This is a chronic condition without change.  Patient has no PND orthopnea.  He does not have any significant lower extremity edema.    Otherwise patient describes doing well      Outpatient Encounter Medications as of 10/2/2019   Medication Sig Dispense Refill   • aspirin 81 MG tablet Take 1 tablet by mouth Daily. 30 tablet 11   • busPIRone (BUSPAR) 10 MG tablet Take 10 mg by mouth 2 (Two) Times a Day.     • carvedilol (COREG) 6.25 MG  tablet Take 1 tablet by mouth 2 (Two) Times a Day. 60 tablet 11   • dicyclomine (BENTYL) 10 MG capsule Take 1 capsule by mouth Daily. 30 capsule 11   • fludrocortisone 0.1 MG tablet Take 1 tablet by mouth Daily. 30 tablet 5   • FLUOXETINE HCL PO Take 40 mg by mouth Daily.     • Fluticasone Furoate-Vilanterol (BREO ELLIPTA IN) Inhale 2 puffs Daily As Needed.     • furosemide (LASIX) 40 MG tablet Take 1 tablet by mouth Daily. 90 tablet 0   • gabapentin (NEURONTIN) 300 MG capsule 2 (Two) Times a Day.  2   • ibuprofen (ADVIL,MOTRIN) 800 MG tablet prn     • linaclotide (LINZESS) 72 MCG capsule capsule Take 72 mcg by mouth Every Morning Before Breakfast. Takes 2 capsules daily     • methocarbamol (ROBAXIN) 750 MG tablet 3 (Three) Times a Day.  1   • nitroglycerin (NITROSTAT) 0.4 MG SL tablet 1 under the tongue as needed for angina, may repeat q5mins for up three doses 100 tablet 11   • pantoprazole (PROTONIX) 40 MG EC tablet Take 1 tablet by mouth Daily. 90 tablet 3   • potassium chloride (K-DUR) 10 MEQ CR tablet Take 1 tablet by mouth Daily. 90 tablet 3   • sacubitril-valsartan (ENTRESTO) 24-26 MG tablet Take 1 tablet by mouth Every 12 (Twelve) Hours. 60 tablet 6   • simvastatin (ZOCOR) 20 MG tablet Take 1 tablet by mouth Every Night. 90 tablet 3   • sotalol (BETAPACE) 120 MG tablet Take 1 tablet by mouth 2 (Two) Times a Day. 60 tablet 11   • traZODone (DESYREL) 100 MG tablet Take 200 mg by mouth Every Night.     • warfarin (COUMADIN) 5 MG tablet Take 1 tablet by mouth Daily. PCP follows.  Patient takes 7.5 mg 3 x week and 5 mg all other day's 30 tablet 11     No facility-administered encounter medications on file as of 10/2/2019.        Patient has no known allergies.    Past Medical History:   Diagnosis Date   • Atrial fibrillation (CMS/HCC)    • Hyperlipidemia    • Hypertension    • ICD (implantable cardioverter-defibrillator) in place        Social History     Socioeconomic History   • Marital status:       "Spouse name: Not on file   • Number of children: Not on file   • Years of education: Not on file   • Highest education level: Not on file   Tobacco Use   • Smoking status: Never Smoker   • Smokeless tobacco: Never Used   Substance and Sexual Activity   • Alcohol use: No   • Drug use: No   • Sexual activity: Defer       Family History   Problem Relation Age of Onset   • No Known Problems Mother    • No Known Problems Father        Review of Systems   Constitutional: Positive for fatigue (stays tired ). Negative for chills and fever.   HENT: Negative.    Eyes: Positive for visual disturbance (glasses daily ).   Respiratory: Positive for chest tightness (Tightness at times even at rest ) and shortness of breath (SOA at rest, episodes are happening more often ).    Cardiovascular: Positive for palpitations (Races at times ). Negative for chest pain and leg swelling.   Gastrointestinal: Positive for abdominal pain. Negative for blood in stool, nausea and vomiting.   Endocrine: Positive for cold intolerance (stays cold ). Negative for heat intolerance.   Genitourinary: Negative.    Musculoskeletal: Positive for arthralgias (joints ). Negative for back pain.   Skin: Negative.  Negative for rash and wound.   Allergic/Immunologic: Negative.  Negative for environmental allergies and food allergies.   Neurological: Positive for light-headedness (when standing too fast ). Negative for dizziness and weakness.   Hematological: Bruises/bleeds easily (bruises and bleeds easily ).   Psychiatric/Behavioral: Positive for sleep disturbance (Has awaken with smothering ).   All other systems reviewed and are negative.      Objective   Vitals:    10/02/19 0843   BP: 117/80   BP Location: Left arm   Patient Position: Sitting   Pulse: 62   SpO2: 98%   Weight: 102 kg (225 lb)   Height: 190.5 cm (75\")      /80 (BP Location: Left arm, Patient Position: Sitting)   Pulse 62   Ht 190.5 cm (75\")   Wt 102 kg (225 lb)   SpO2 98%   BMI " 28.12 kg/m²     Lab Results (most recent)     None          Physical Exam   Constitutional: He is oriented to person, place, and time. He appears well-developed and well-nourished. No distress.   HENT:   Head: Normocephalic and atraumatic.   Eyes: EOM are normal. Pupils are equal, round, and reactive to light.   Neck: No JVD present.   Cardiovascular: Normal rate, regular rhythm, normal heart sounds and intact distal pulses. Exam reveals no gallop and no friction rub.   No murmur heard.  Pulmonary/Chest: Effort normal and breath sounds normal. No respiratory distress. He has no wheezes. He has no rales. He exhibits no tenderness.   Musculoskeletal: Normal range of motion. He exhibits no edema.   Neurological: He is alert and oriented to person, place, and time. No cranial nerve deficit.   Skin: Skin is warm and dry. No rash noted. No erythema. No pallor.   Psychiatric: He has a normal mood and affect. His behavior is normal.   Nursing note and vitals reviewed.      Procedure   Procedures       Assessment/Plan     Problems Addressed this Visit        Cardiovascular and Mediastinum    Systolic congestive heart failure (CMS/HCC)    Dilated cardiomyopathy (CMS/HCC)    Nonsustained ventricular tachycardia (CMS/HCC)       Respiratory    Shortness of breath - Primary            Recommendation  1.  Nonischemic dilated cardiomyopathy with stable ejection fraction.  Patient with frequent PVCs and one episode of nonsustained V. tach.  Sotalol was increased and he feels better.  He continues to have tachycardia.  I would like to set him up to have repeat interrogation to evaluate further.  We may have to consider EP evaluation.  2.  Chronic systolic heart failure but euvolemic by examination.  We will continue current therapy.  3.  Dyspnea is mild to moderate at baseline.  Chest tightness is chronic.  For now we will continue.  I am stopping Florinef as he had no improvement of symptoms since starting that medication.  We will  we may have to adjust medicine if patient has significant tachycardia.  We will see him back for follow-up 1 to 2 months.  Will follow with primary as scheduled         Patient's Body mass index is 28.12 kg/m². BMI is above normal parameters. Recommendations include: educational material and referral to primary care.       Electronically signed by:

## 2019-10-02 NOTE — PATIENT INSTRUCTIONS
"Fat and Cholesterol Restricted Eating Plan  Getting too much fat and cholesterol in your diet may cause health problems. Choosing the right foods helps keep your fat and cholesterol at normal levels. This can keep you from getting certain diseases.  Your doctor may recommend an eating plan that includes:  · Total fat: ______% or less of total calories a day.  · Saturated fat: ______% or less of total calories a day.  · Cholesterol: less than _________mg a day.  · Fiber: ______g a day.  What are tips for following this plan?  General tips    · Work with your doctor to lose weight if you need to.  · Avoid:  ? Foods with added sugar.  ? Fried foods.  ? Foods with partially hydrogenated oils.  · Limit alcohol intake to no more than 1 drink a day for nonpregnant women and 2 drinks a day for men. One drink equals 12 oz of beer, 5 oz of wine, or 1½ oz of hard liquor.  Reading food labels  · Check food labels for:  ? Trans fats.  ? Partially hydrogenated oils.  ? Saturated fat (g) in each serving.  ? Cholesterol (mg) in each serving.  ? Fiber (g) in each serving.  · Choose foods with healthy fats, such as:  ? Monounsaturated fats.  ? Polyunsaturated fats.  ? Omega-3 fats.  · Choose grain products that have whole grains. Look for the word \"whole\" as the first word in the ingredient list.  Cooking  · Cook foods using low-fat methods. These include baking, boiling, grilling, and broiling.  · Eat more home-cooked foods. Eat at restaurants and buffets less often.  · Avoid cooking using saturated fats, such as butter, cream, palm oil, palm kernel oil, and coconut oil.  Meal planning    · At meals, divide your plate into four equal parts:  ? Fill one-half of your plate with vegetables and green salads.  ? Fill one-fourth of your plate with whole grains.  ? Fill one-fourth of your plate with low-fat (lean) protein foods.  · Eat fish that is high in omega-3 fats at least two times a week. This includes mackerel, tuna, sardines, and " salmon.  · Eat foods that are high in fiber, such as whole grains, beans, apples, broccoli, carrots, peas, and barley.  Recommended foods  Grains  · Whole grains, such as whole wheat or whole grain breads, crackers, cereals, and pasta. Unsweetened oatmeal, bulgur, barley, quinoa, or brown rice. Corn or whole wheat flour tortillas.  Vegetables  · Fresh or frozen vegetables (raw, steamed, roasted, or grilled). Green salads.  Fruits  · All fresh, canned (in natural juice), or frozen fruits.  Meats and other protein foods  · Ground beef (85% or leaner), grass-fed beef, or beef trimmed of fat. Skinless chicken or turkey. Ground chicken or turkey. Pork trimmed of fat. All fish and seafood. Egg whites. Dried beans, peas, or lentils. Unsalted nuts or seeds. Unsalted canned beans. Nut butters without added sugar or oil.  Dairy  · Low-fat or nonfat dairy products, such as skim or 1% milk, 2% or reduced-fat cheeses, low-fat and fat-free ricotta or cottage cheese, or plain low-fat and nonfat yogurt.  Fats and oils  · Tub margarine without trans fats. Light or reduced-fat mayonnaise and salad dressings. Avocado. Olive, canola, sesame, or safflower oils.  The items listed above may not be a complete list of recommended foods or beverages. Contact your dietitian for more options.  The items listed above may not be a complete list of foods and beverages [you/your child] can eat. Contact a dietitian for more information.  Foods to avoid  Grains  · White bread. White pasta. White rice. Cornbread. Bagels, pastries, and croissants. Crackers and snack foods that contain trans fat and hydrogenated oils.  Vegetables  · Vegetables cooked in cheese, cream, or butter sauce. Fried vegetables.  Fruits  · Canned fruit in heavy syrup. Fruit in cream or butter sauce. Fried fruit.  Meats and other protein foods  · Fatty cuts of meat. Ribs, chicken wings, chaves, sausage, bologna, salami, chitterlings, fatback, hot dogs, bratwurst, and packaged  lunch meats. Liver and organ meats. Whole eggs and egg yolks. Chicken and turkey with skin. Fried meat.  Dairy  · Whole or 2% milk, cream, half-and-half, and cream cheese. Whole milk cheeses. Whole-fat or sweetened yogurt. Full-fat cheeses. Nondairy creamers and whipped toppings. Processed cheese, cheese spreads, and cheese curds.  Beverages  · Alcohol. Sugar-sweetened drinks such as sodas, lemonade, and fruit drinks.  Fats and oils  · Butter, stick margarine, lard, shortening, ghee, or chaves fat. Coconut, palm kernel, and palm oils.  Sweets and desserts  · Corn syrup, sugars, honey, and molasses. Candy. Jam and jelly. Syrup. Sweetened cereals. Cookies, pies, cakes, donuts, muffins, and ice cream.  The items listed above may not be a complete list of foods and beverages to avoid. Contact your dietitian for more information.  The items listed above may not be a complete list of foods and beverages [you/your child] should avoid. Contact a dietitian for more information.  Summary  · Choosing the right foods helps keep your fat and cholesterol at normal levels. This can keep you from getting certain diseases.  · At meals, fill one-half of your plate with vegetables and green salads.  · Eat high-fiber foods, like whole grains, beans, apples, carrots, peas, and barley.  · Limit added sugar, saturated fats, alcohol, and fried foods.  This information is not intended to replace advice given to you by your health care provider. Make sure you discuss any questions you have with your health care provider.  Document Released: 06/18/2013 Document Revised: 09/04/2018 Document Reviewed: 09/04/2018  [employer] Interactive Patient Education © 2019 [employer]  BMI for Adults    Body mass index (BMI) is a number that is calculated from a person's weight and height. BMI may help to estimate how much of a person's weight is composed of fat. BMI can help identify those who may be at higher risk for certain medical problems.  How is BMI  "used with adults?  BMI is used as a screening tool to identify possible weight problems. It is used to check whether a person is obese, overweight, healthy weight, or underweight.  How is BMI calculated?  BMI measures your weight and compares it to your height. This can be done either in English ([place] or metric measurements. Note that charts are available to help you find your BMI quickly and easily without having to do these calculations yourself.  To calculate your BMI in English ([place] measurements, your health care provider will:  1. Measure your weight in pounds (lb).  2. Multiply the number of pounds by 703.  ? For example, for a person who weighs 180 lb, multiply that number by 703, which equals 126,540.  3. Measure your height in inches (in). Then multiply that number by itself to get a measurement called \"inches squared.\"  ? For example, for a person who is 70 in tall, the \"inches squared\" measurement is 70 in x 70 in, which equals 4900 inches squared.  4. Divide the total from Step 2 (number of lb x 703) by the total from Step 3 (inches squared): 126,540 ÷ 4900 = 25.8. This is your BMI.  To calculate your BMI in metric measurements, your health care provider will:  1. Measure your weight in kilograms (kg).  2. Measure your height in meters (m). Then multiply that number by itself to get a measurement called \"meters squared.\"  ? For example, for a person who is 1.75 m tall, the \"meters squared\" measurement is 1.75 m x 1.75 m, which is equal to 3.1 meters squared.  3. Divide the number of kilograms (your weight) by the meters squared number. In this example: 70 ÷ 3.1 = 22.6. This is your BMI.  How is BMI interpreted?  To interpret your results, your health care provider will use BMI charts to identify whether you are underweight, normal weight, overweight, or obese. The following guidelines will be used:  · Underweight: BMI less than 18.5.  · Normal weight: BMI between 18.5 and 24.9.  · Overweight: BMI " between 25 and 29.9.  · Obese: BMI of 30 and above.  Please note:  · Weight includes both fat and muscle, so someone with a muscular build, such as an athlete, may have a BMI that is higher than 24.9. In cases like these, BMI is not an accurate measure of body fat.  · To determine if excess body fat is the cause of a BMI of 25 or higher, further assessments may need to be done by a health care provider.  · BMI is usually interpreted in the same way for men and women.  Why is BMI a useful tool?  BMI is useful in two ways:  · Identifying a weight problem that may be related to a medical condition, or that may increase the risk for medical problems.  · Promoting lifestyle and diet changes in order to reach a healthy weight.  Summary  · Body mass index (BMI) is a number that is calculated from a person's weight and height.  · BMI may help to estimate how much of a person's weight is composed of fat. BMI can help identify those who may be at higher risk for certain medical problems.  · BMI can be measured using English measurements or metric measurements.  · To interpret your results, your health care provider will use BMI charts to identify whether you are underweight, normal weight, overweight, or obese.  This information is not intended to replace advice given to you by your health care provider. Make sure you discuss any questions you have with your health care provider.  Document Released: 08/29/2005 Document Revised: 10/31/2018 Document Reviewed: 10/31/2018  [employer] Interactive Patient Education © 2019 [employer]

## 2019-10-25 ENCOUNTER — OFFICE VISIT (OUTPATIENT)
Dept: CARDIOLOGY | Facility: CLINIC | Age: 54
End: 2019-10-25

## 2019-10-25 DIAGNOSIS — I42.0 DILATED CARDIOMYOPATHY (HCC): Primary | ICD-10-CM

## 2019-10-25 PROCEDURE — 93289 INTERROG DEVICE EVAL HEART: CPT | Performed by: INTERNAL MEDICINE

## 2019-10-28 DIAGNOSIS — I42.9 CARDIOMYOPATHY, UNSPECIFIED TYPE (HCC): ICD-10-CM

## 2019-10-28 DIAGNOSIS — R60.9 EDEMA, UNSPECIFIED TYPE: ICD-10-CM

## 2019-10-28 DIAGNOSIS — R06.02 SHORTNESS OF BREATH: ICD-10-CM

## 2019-10-28 RX ORDER — FUROSEMIDE 40 MG/1
TABLET ORAL
Qty: 90 TABLET | Refills: 3 | Status: SHIPPED | OUTPATIENT
Start: 2019-10-28 | End: 2020-06-09 | Stop reason: SDUPTHER

## 2019-10-31 ENCOUNTER — TELEPHONE (OUTPATIENT)
Dept: CARDIOLOGY | Facility: CLINIC | Age: 54
End: 2019-10-31

## 2019-10-31 NOTE — TELEPHONE ENCOUNTER
----- Message from ROSEANNE Ugarte sent at 10/30/2019  2:07 PM EDT -----  Regarding: FW: pt fall w/injury  Contact: [phone]  What do we need to do about this  ----- Message -----  From: Liya Starkey  Sent: 10/29/2019   4:37 PM  To: ROSEANNE Ugarte  Subject: RE: pt fall w/injury                             Called patient back but could not reach him.  ----- Message -----  From: Devaughn Terrazas PA  Sent: 10/28/2019  12:30 PM  To: Liya Starkey  Subject: FW: pt fall w/injury                             Can we have patient have a nurse visit this week bp check  ----- Message -----  From: Liya Starkey  Sent: 10/24/2019   1:28 PM  To: ROSEANNE Ugarte  Subject: pt fall w/injury                                 Patient called stated that he had blacked out and fallen. Felt he may have damaged his pacemaker during the incident. So he followed up with ER visit. He is very bruised but otherwise okay. He wanted Provider to know about the incident and will be in tomorrow for a pacemaker check service.

## 2019-11-01 ENCOUNTER — OFFICE VISIT (OUTPATIENT)
Dept: CARDIOLOGY | Facility: CLINIC | Age: 54
End: 2019-11-01

## 2019-11-01 VITALS
WEIGHT: 229.8 LBS | OXYGEN SATURATION: 96 % | BODY MASS INDEX: 28.57 KG/M2 | SYSTOLIC BLOOD PRESSURE: 101 MMHG | HEART RATE: 70 BPM | HEIGHT: 75 IN | DIASTOLIC BLOOD PRESSURE: 73 MMHG

## 2019-11-01 DIAGNOSIS — R42 DIZZINESS: ICD-10-CM

## 2019-11-01 DIAGNOSIS — R06.02 SHORTNESS OF BREATH: ICD-10-CM

## 2019-11-01 DIAGNOSIS — I42.0 DILATED CARDIOMYOPATHY (HCC): ICD-10-CM

## 2019-11-01 DIAGNOSIS — R55 SYNCOPE AND COLLAPSE: Primary | ICD-10-CM

## 2019-11-01 PROCEDURE — 99214 OFFICE O/P EST MOD 30 MIN: CPT | Performed by: PHYSICIAN ASSISTANT

## 2019-11-01 RX ORDER — TAMSULOSIN HYDROCHLORIDE 0.4 MG/1
1 CAPSULE ORAL DAILY
Refills: 1 | COMMUNITY
Start: 2019-10-15 | End: 2023-03-28

## 2019-11-01 RX ORDER — HYDROCODONE BITARTRATE AND ACETAMINOPHEN 5; 325 MG/1; MG/1
1 TABLET ORAL AS NEEDED
Refills: 0 | COMMUNITY
Start: 2019-10-16 | End: 2021-11-29

## 2019-11-01 RX ORDER — METRONIDAZOLE 500 MG/1
500 TABLET ORAL 3 TIMES DAILY
Refills: 0 | COMMUNITY
Start: 2019-10-16 | End: 2019-12-04

## 2019-11-01 NOTE — PATIENT INSTRUCTIONS

## 2019-11-01 NOTE — PROGRESS NOTES
Problem list     Subjective   Bill Umana is a 54 y.o. male     Chief Complaint   Patient presents with   • Loss of Consciousness   • Dizziness   Problem list  1.  History of nonischemic dilated cardiomyopathy status post AICD implant ([employer])   1.1 recent interrogation demonstrates 4 episodes of nonsustained ventricular tachycardia and one episode of ventricular tachycardia converted with ATP without defibrillation  1.2 patient on Sotalol for ventricular tachycardia followed by EP services  2.  Chronic systolic heart failure  2.1 EF estimated at 35-40% by echocardiogram February 2018  2.2 EF estimated in the low 40s post stress December 2018  2.3 EF by ventriculography during catheterization March 2018 at 40-45%  3.  Atrial fibrillation on Coumadin therapy  4.  Dyslipidemia  5.  GERD  6.  Mild mitral and tricuspid regurgitation  7.  Cardiac catheterization March 2018 with normal coronaries and an EF estimated at 40-45%    HPI    The patient presents in today at the request of the emergency room.  He was evaluated in the emergency room following a syncopal episode last week.  The patient tells me that he was in usual state of health.  He was at his local [employer] helping with a [employer] event.  He developed immediate onset weakness and dizziness.  He then had blackened vision and eventually complete syncope.  He was out for several seconds to a couple of minutes.  He was eventually taken on to the emergency room.  His laboratory evaluations were unremarkable.  Orthostatic blood pressure checks were unremarkable, although the patient does describe a component of orthostasis at that time.  His EKG was benign.  He did have an interrogation of his ICD which indicated normal function with no dysrhythmic activity.  He was discharged for outpatient work-up and presents today in that setting.  He has had one other syncopal episode.  This occurred while at home.  He does not recall the circumstances or symptoms  preceding his syncope.  He cannot recall if there was any evidence of orthostatic changes at that time or not.  Irregardless, the patient rarely has diaphoretic episodes with his presyncopal and syncopal episodes.  He has no nausea at that time however.  He has had no associated chest pain.  He has had no further complaints otherwise.  Current Outpatient Medications on File Prior to Visit   Medication Sig Dispense Refill   • aspirin 81 MG tablet Take 1 tablet by mouth Daily. 30 tablet 11   • busPIRone (BUSPAR) 10 MG tablet Take 10 mg by mouth 2 (Two) Times a Day.     • carvedilol (COREG) 6.25 MG tablet Take 1 tablet by mouth 2 (Two) Times a Day. (Patient taking differently: Take 3.125 mg by mouth 2 (Two) Times a Day.) 60 tablet 11   • dicyclomine (BENTYL) 10 MG capsule Take 1 capsule by mouth Daily. 30 capsule 11   • fludrocortisone 0.1 MG tablet Take 1 tablet by mouth Daily. 30 tablet 5   • FLUOXETINE HCL PO Take 40 mg by mouth Daily.     • Fluticasone Furoate-Vilanterol (BREO ELLIPTA IN) Inhale 2 puffs Daily As Needed.     • furosemide (LASIX) 40 MG tablet TAKE 1 TABLET EVERY DAY 90 tablet 3   • gabapentin (NEURONTIN) 300 MG capsule 2 (Two) Times a Day.  2   • HYDROcodone-acetaminophen (NORCO) 5-325 MG per tablet Take 1 tablet by mouth As Needed.  0   • ibuprofen (ADVIL,MOTRIN) 800 MG tablet prn     • linaclotide (LINZESS) 72 MCG capsule capsule Take 72 mcg by mouth Every Morning Before Breakfast. Takes 2 capsules daily     • methocarbamol (ROBAXIN) 750 MG tablet 3 (Three) Times a Day.  1   • metroNIDAZOLE (FLAGYL) 500 MG tablet Take 500 mg by mouth 3 (Three) Times a Day.  0   • nitroglycerin (NITROSTAT) 0.4 MG SL tablet 1 under the tongue as needed for angina, may repeat q5mins for up three doses 100 tablet 11   • pantoprazole (PROTONIX) 40 MG EC tablet Take 1 tablet by mouth Daily. 90 tablet 3   • potassium chloride (K-DUR) 10 MEQ CR tablet Take 1 tablet by mouth Daily. 90 tablet 3   • sacubitril-valsartan  (ENTRESTO) 24-26 MG tablet Take 1 tablet by mouth Every 12 (Twelve) Hours. 60 tablet 6   • simvastatin (ZOCOR) 20 MG tablet Take 1 tablet by mouth Every Night. 90 tablet 3   • sotalol (BETAPACE) 120 MG tablet Take 1 tablet by mouth 2 (Two) Times a Day. 60 tablet 11   • tamsulosin (FLOMAX) 0.4 MG capsule 24 hr capsule Take 1 capsule by mouth Daily.  1   • traZODone (DESYREL) 100 MG tablet Take 200 mg by mouth Every Night.     • warfarin (COUMADIN) 5 MG tablet Take 1 tablet by mouth Daily. PCP follows.  Patient takes 7.5 mg 3 x week and 5 mg all other day's 30 tablet 11     No current facility-administered medications on file prior to visit.        Patient has no known allergies.    Past Medical History:   Diagnosis Date   • Atrial fibrillation (CMS/HCC)    • Diverticulitis    • Hyperlipidemia    • Hypertension    • ICD (implantable cardioverter-defibrillator) in place        Social History     Socioeconomic History   • Marital status:      Spouse name: Not on file   • Number of children: Not on file   • Years of education: Not on file   • Highest education level: Not on file   Tobacco Use   • Smoking status: Never Smoker   • Smokeless tobacco: Never Used   Substance and Sexual Activity   • Alcohol use: No   • Drug use: No   • Sexual activity: Defer       Family History   Problem Relation Age of Onset   • No Known Problems Mother    • No Known Problems Father        Review of Systems   Constitutional: Positive for fatigue (easily fatigued).   HENT: Negative for congestion, rhinorrhea and sneezing.    Eyes: Positive for visual disturbance (wears glasses daily).   Respiratory: Positive for shortness of breath (easily SOA ; worse on exertion ). Negative for cough, chest tightness and wheezing.    Cardiovascular: Positive for palpitations (occasional palpitations/flutters). Negative for chest pain and leg swelling.   Gastrointestinal: Negative.  Negative for abdominal pain, nausea and vomiting.   Endocrine:  "Negative.  Negative for cold intolerance and heat intolerance.   Genitourinary: Negative.  Negative for difficulty urinating, frequency and urgency.   Musculoskeletal: Positive for arthralgias (joints) and back pain (back pain from arthritis). Negative for neck pain and neck stiffness.   Skin: Negative.  Negative for rash and wound.   Allergic/Immunologic: Negative.  Negative for environmental allergies and food allergies.   Neurological: Positive for dizziness (dizziness ), syncope (x2 syncopal episodes), light-headedness (light headedness ) and headaches (headaches with H/O migraines). Negative for weakness.   Hematological: Bruises/bleeds easily (bleeds easily).   Psychiatric/Behavioral: Negative for agitation, confusion and sleep disturbance (denies waking up smothering/SOA). The patient is nervous/anxious (easily nervous/anxious).        Objective   Vitals:    11/01/19 0854   BP: 101/73   BP Location: Left arm   Patient Position: Sitting   Pulse: 70   SpO2: 96%   Weight: 104 kg (229 lb 12.8 oz)   Height: 190.5 cm (75\")      /73 (BP Location: Left arm, Patient Position: Sitting)   Pulse 70   Ht 190.5 cm (75\")   Wt 104 kg (229 lb 12.8 oz)   SpO2 96%   BMI 28.72 kg/m²    Lab Results (most recent)     None        Physical Exam   Constitutional: He is oriented to person, place, and time. He appears well-developed and well-nourished. No distress.   HENT:   Head: Normocephalic and atraumatic.   Eyes: EOM are normal. Pupils are equal, round, and reactive to light.   Neck: No JVD present.   Cardiovascular: Normal rate, regular rhythm, normal heart sounds and intact distal pulses. Exam reveals no gallop and no friction rub.   No murmur heard.  Pulmonary/Chest: Effort normal and breath sounds normal. No respiratory distress. He has no wheezes. He has no rales. He exhibits no tenderness.   Musculoskeletal: Normal range of motion. He exhibits no edema.   Neurological: He is alert and oriented to person, place, " and time. No cranial nerve deficit.   Skin: Skin is warm and dry. No rash noted. No erythema. No pallor.   Psychiatric: He has a normal mood and affect. His behavior is normal.   Nursing note and vitals reviewed.        Procedure   Procedures       Assessment/Plan      Diagnosis Plan   1. Syncope and collapse  Adult Transthoracic Echo Complete W/ Cont if Necessary Per Protocol    Duplex Carotid Ultrasound CAR   2. Dizziness  Adult Transthoracic Echo Complete W/ Cont if Necessary Per Protocol    Duplex Carotid Ultrasound CAR   3. Dilated cardiomyopathy (CMS/HCC)  Adult Transthoracic Echo Complete W/ Cont if Necessary Per Protocol    Duplex Carotid Ultrasound CAR   4. Shortness of breath  Adult Transthoracic Echo Complete W/ Cont if Necessary Per Protocol    Duplex Carotid Ultrasound CAR   1.  With the patient's syncopal episode which prompted recent ER evaluation, I do feel that we need to repeat some cardiac testing.  I would like to schedule for an echocardiogram to reevaluate the patient structurally.  I will schedule for carotid duplex as well.    2.  Even orthostatic blood pressure checks were unremarkable at time of ER evaluation, the patient describes clear-cut components of orthostasis at times.  He is on Florinef and has felt improved in that with regards to orthostatic changes.  I would like to decrease his furosemide however from 40 to 20 mg daily may eventually consider holding it completely depending on his clinical course.  If he tolerates that with regards to his dilated cardiomyopathy, we may utilize Lasix as needed.    3.  All of his work-up for syncope was benign during the ER evaluation.  Telemetry, laboratories, radiologic exams, etc. all were benign.  His interrogation of his ICD at that time indicated normal function with no dysrhythmic activity to explain syncope.    4.  I do not feel that we need to repeat ischemia assessment in the setting of syncope.  He had basically a normal  catheterization most recently in work-up for his dilated cardiomyopathy.  I feel nothing further is warranted in that regard.    5.  We will see him back as soon as we have results of the above studies.  We will follow him very closely to the clinic.  He will let me know in a few days how he feels on decreased dosing of diuretic therapies.  He will continue his medical regimen otherwise.  We may have to augment his medical regimen further in the future, but we will see how he responds at this time to the above subtle changes and will review results of the above studies before we recommend further.  He will call for any further problems.             Patient's Body mass index is 28.72 kg/m². BMI is above normal parameters. Recommendations include: educational material and referral to primary care.             Electronically signed by:

## 2019-11-11 ENCOUNTER — HOSPITAL ENCOUNTER (OUTPATIENT)
Dept: CARDIOLOGY | Facility: HOSPITAL | Age: 54
Discharge: HOME | End: 2019-11-11
Admitting: PHYSICIAN ASSISTANT

## 2019-11-11 ENCOUNTER — HOSPITAL ENCOUNTER (OUTPATIENT)
Dept: CARDIOLOGY | Facility: HOSPITAL | Age: 54
Discharge: HOME | End: 2019-11-11

## 2019-11-11 DIAGNOSIS — R55 SYNCOPE AND COLLAPSE: ICD-10-CM

## 2019-11-11 DIAGNOSIS — I42.0 DILATED CARDIOMYOPATHY (HCC): ICD-10-CM

## 2019-11-11 DIAGNOSIS — R42 DIZZINESS: ICD-10-CM

## 2019-11-11 DIAGNOSIS — R06.02 SHORTNESS OF BREATH: ICD-10-CM

## 2019-11-11 PROCEDURE — 93306 TTE W/DOPPLER COMPLETE: CPT

## 2019-11-11 PROCEDURE — 93306 TTE W/DOPPLER COMPLETE: CPT | Performed by: INTERNAL MEDICINE

## 2019-11-11 PROCEDURE — 93880 EXTRACRANIAL BILAT STUDY: CPT | Performed by: INTERNAL MEDICINE

## 2019-11-11 PROCEDURE — 93880 EXTRACRANIAL BILAT STUDY: CPT

## 2019-11-22 ENCOUNTER — OFFICE VISIT (OUTPATIENT)
Dept: CARDIOLOGY | Facility: CLINIC | Age: 54
End: 2019-11-22

## 2019-11-22 DIAGNOSIS — I42.0 DILATED CARDIOMYOPATHY (HCC): Primary | ICD-10-CM

## 2019-11-22 PROCEDURE — 93289 INTERROG DEVICE EVAL HEART: CPT | Performed by: INTERNAL MEDICINE

## 2019-11-24 LAB
BH CV ECHO MEAS - ACS: 2.3 CM
BH CV ECHO MEAS - AO MAX PG: 5 MMHG
BH CV ECHO MEAS - AO MEAN PG: 3 MMHG
BH CV ECHO MEAS - AO ROOT AREA (BSA CORRECTED): 1.6
BH CV ECHO MEAS - AO ROOT AREA: 10.5 CM^2
BH CV ECHO MEAS - AO ROOT DIAM: 3.7 CM
BH CV ECHO MEAS - AO V2 MAX: 112 CM/SEC
BH CV ECHO MEAS - AO V2 MEAN: 86.5 CM/SEC
BH CV ECHO MEAS - AO V2 VTI: 25.3 CM
BH CV ECHO MEAS - BSA(HAYCOCK): 2.4 M^2
BH CV ECHO MEAS - BSA(HAYCOCK): 2.4 M^2
BH CV ECHO MEAS - BSA: 2.3 M^2
BH CV ECHO MEAS - BSA: 2.3 M^2
BH CV ECHO MEAS - BZI_BMI: 28.6 KILOGRAMS/M^2
BH CV ECHO MEAS - BZI_BMI: 28.6 KILOGRAMS/M^2
BH CV ECHO MEAS - BZI_METRIC_HEIGHT: 190.5 CM
BH CV ECHO MEAS - BZI_METRIC_HEIGHT: 190.5 CM
BH CV ECHO MEAS - BZI_METRIC_WEIGHT: 103.9 KG
BH CV ECHO MEAS - BZI_METRIC_WEIGHT: 103.9 KG
BH CV ECHO MEAS - EDV(CUBED): 131.9 ML
BH CV ECHO MEAS - EDV(MOD-SP4): 144 ML
BH CV ECHO MEAS - EDV(TEICH): 123.2 ML
BH CV ECHO MEAS - EF(CUBED): 51.8 %
BH CV ECHO MEAS - EF(MOD-SP4): 35.8 %
BH CV ECHO MEAS - EF(TEICH): 43.5 %
BH CV ECHO MEAS - ESV(CUBED): 63.5 ML
BH CV ECHO MEAS - ESV(MOD-SP4): 92.5 ML
BH CV ECHO MEAS - ESV(TEICH): 69.6 ML
BH CV ECHO MEAS - FS: 21.6 %
BH CV ECHO MEAS - IVS/LVPW: 1.2
BH CV ECHO MEAS - IVSD: 1.4 CM
BH CV ECHO MEAS - LA DIMENSION: 4.6 CM
BH CV ECHO MEAS - LA/AO: 1.3
BH CV ECHO MEAS - LV DIASTOLIC VOL/BSA (35-75): 62 ML/M^2
BH CV ECHO MEAS - LV IVRT: 0.09 SEC
BH CV ECHO MEAS - LV MASS(C)D: 269.2 GRAMS
BH CV ECHO MEAS - LV MASS(C)DI: 115.8 GRAMS/M^2
BH CV ECHO MEAS - LV SYSTOLIC VOL/BSA (12-30): 39.8 ML/M^2
BH CV ECHO MEAS - LVIDD: 5.1 CM
BH CV ECHO MEAS - LVIDS: 4 CM
BH CV ECHO MEAS - LVLD AP4: 7.7 CM
BH CV ECHO MEAS - LVLS AP4: 7.2 CM
BH CV ECHO MEAS - LVOT AREA (M): 4.9 CM^2
BH CV ECHO MEAS - LVOT AREA: 4.9 CM^2
BH CV ECHO MEAS - LVOT DIAM: 2.5 CM
BH CV ECHO MEAS - LVPWD: 1.2 CM
BH CV ECHO MEAS - MV A MAX VEL: 60.4 CM/SEC
BH CV ECHO MEAS - MV DEC SLOPE: 113 CM/SEC^2
BH CV ECHO MEAS - MV E MAX VEL: 36 CM/SEC
BH CV ECHO MEAS - MV E/A: 0.6
BH CV ECHO MEAS - RAP SYSTOLE: 10 MMHG
BH CV ECHO MEAS - RVDD: 3.2 CM
BH CV ECHO MEAS - RVSP: 32.3 MMHG
BH CV ECHO MEAS - SI(AO): 113.9 ML/M^2
BH CV ECHO MEAS - SI(CUBED): 29.4 ML/M^2
BH CV ECHO MEAS - SI(MOD-SP4): 22.2 ML/M^2
BH CV ECHO MEAS - SI(TEICH): 23.1 ML/M^2
BH CV ECHO MEAS - SV(AO): 264.7 ML
BH CV ECHO MEAS - SV(CUBED): 68.4 ML
BH CV ECHO MEAS - SV(MOD-SP4): 51.5 ML
BH CV ECHO MEAS - SV(TEICH): 53.7 ML
BH CV ECHO MEAS - TR MAX VEL: 236 CM/SEC
BH CV XLRA MEAS LEFT BULB EDV: -12.7 CM/SEC
BH CV XLRA MEAS LEFT BULB PSV: -55.5 CM/SEC
BH CV XLRA MEAS LEFT CCA RATIO VEL: -85.6 CM/SEC
BH CV XLRA MEAS LEFT DIST CCA EDV: -20.5 CM/SEC
BH CV XLRA MEAS LEFT DIST CCA PSV: -85.6 CM/SEC
BH CV XLRA MEAS LEFT DIST ICA EDV: -34.4 CM/SEC
BH CV XLRA MEAS LEFT DIST ICA PSV: -86.2 CM/SEC
BH CV XLRA MEAS LEFT ICA RATIO VEL: -86.2 CM/SEC
BH CV XLRA MEAS LEFT ICA/CCA RATIO: 1
BH CV XLRA MEAS LEFT MID ICA EDV: -28.3 CM/SEC
BH CV XLRA MEAS LEFT MID ICA PSV: -80.8 CM/SEC
BH CV XLRA MEAS LEFT PROX CCA EDV: 18.1 CM/SEC
BH CV XLRA MEAS LEFT PROX CCA PSV: 83.8 CM/SEC
BH CV XLRA MEAS LEFT PROX ECA EDV: -20.5 CM/SEC
BH CV XLRA MEAS LEFT PROX ECA PSV: -104 CM/SEC
BH CV XLRA MEAS LEFT PROX ICA EDV: -28.6 CM/SEC
BH CV XLRA MEAS LEFT PROX ICA PSV: -63 CM/SEC
BH CV XLRA MEAS LEFT VERTEBRAL A EDV: 13.3 CM/SEC
BH CV XLRA MEAS LEFT VERTEBRAL A PSV: 46.4 CM/SEC
BH CV XLRA MEAS RIGHT BULB EDV: -22.3 CM/SEC
BH CV XLRA MEAS RIGHT BULB PSV: -98 CM/SEC
BH CV XLRA MEAS RIGHT CCA RATIO VEL: -97.1 CM/SEC
BH CV XLRA MEAS RIGHT DIST CCA EDV: -18 CM/SEC
BH CV XLRA MEAS RIGHT DIST CCA PSV: -97.1 CM/SEC
BH CV XLRA MEAS RIGHT DIST ICA EDV: -30.2 CM/SEC
BH CV XLRA MEAS RIGHT DIST ICA PSV: -73 CM/SEC
BH CV XLRA MEAS RIGHT ICA RATIO VEL: -73 CM/SEC
BH CV XLRA MEAS RIGHT ICA/CCA RATIO: 0.75
BH CV XLRA MEAS RIGHT MID ICA EDV: -22.9 CM/SEC
BH CV XLRA MEAS RIGHT MID ICA PSV: -65.1 CM/SEC
BH CV XLRA MEAS RIGHT PROX CCA EDV: 15.5 CM/SEC
BH CV XLRA MEAS RIGHT PROX CCA PSV: 93.7 CM/SEC
BH CV XLRA MEAS RIGHT PROX ECA EDV: -12.9 CM/SEC
BH CV XLRA MEAS RIGHT PROX ECA PSV: -112 CM/SEC
BH CV XLRA MEAS RIGHT PROX ICA EDV: -20.2 CM/SEC
BH CV XLRA MEAS RIGHT PROX ICA PSV: -54.6 CM/SEC
BH CV XLRA MEAS RIGHT VERTEBRAL A EDV: 7.2 CM/SEC
BH CV XLRA MEAS RIGHT VERTEBRAL A PSV: 39.8 CM/SEC
MAXIMAL PREDICTED HEART RATE: 165 BPM
STRESS TARGET HR: 140 BPM

## 2019-12-04 ENCOUNTER — OFFICE VISIT (OUTPATIENT)
Dept: CARDIOLOGY | Facility: CLINIC | Age: 54
End: 2019-12-04

## 2019-12-04 VITALS
HEART RATE: 60 BPM | OXYGEN SATURATION: 96 % | SYSTOLIC BLOOD PRESSURE: 102 MMHG | BODY MASS INDEX: 29.22 KG/M2 | HEIGHT: 75 IN | WEIGHT: 235 LBS | DIASTOLIC BLOOD PRESSURE: 69 MMHG

## 2019-12-04 DIAGNOSIS — R07.9 CHEST PAIN, UNSPECIFIED TYPE: ICD-10-CM

## 2019-12-04 DIAGNOSIS — I42.0 NONISCHEMIC DILATED CARDIOMYOPATHY (HCC): ICD-10-CM

## 2019-12-04 DIAGNOSIS — R53.82 CHRONIC FATIGUE: Primary | ICD-10-CM

## 2019-12-04 DIAGNOSIS — I47.29 NONSUSTAINED VENTRICULAR TACHYCARDIA (HCC): ICD-10-CM

## 2019-12-04 PROCEDURE — 99214 OFFICE O/P EST MOD 30 MIN: CPT | Performed by: PHYSICIAN ASSISTANT

## 2019-12-04 NOTE — PROGRESS NOTES
Problem list     Subjective   Bill Umana is a 54 y.o. male     Chief Complaint   Patient presents with   • Cardiomyopathy     Here for a follow up    • Shortness of Breath   Problem list  1.  History of nonischemic dilated cardiomyopathy status post AICD implant ([employer])   1.1 recent interrogation demonstrates 4 episodes of nonsustained ventricular tachycardia and one episode of ventricular tachycardia converted with ATP without defibrillation  1.2 patient on Sotalol for ventricular tachycardia followed by EP services  2.  Chronic systolic heart failure  2.1 EF estimated at 35-40% by echocardiogram February 2018  2.2 EF estimated in the low 40s post stress December 2018  2.3 EF by ventriculography during catheterization March 2018 at 40-45%  2.4 EF by echocardiogram November 2019 at 35 to 40% with grade 1 diastolic dysfunction  3.  Atrial fibrillation on Coumadin therapy  4.  Dyslipidemia  5.  GERD  6.  Mild mitral and tricuspid regurgitation  7.  Cardiac catheterization March 2018 with normal coronaries and an EF estimated at 40-45%  8.  Nonobstructive carotid artery stenosis with 16 to 49% bilateral internal carotid artery stenosis by duplex November 2019       HPI    Patient is a 54-year-old male who presents back to the office for follow-up.  Patient has felt poorly.  Last office visit, there was concern that patient had recurrent syncope.  It appeared to be neurocardiogenic related to orthostasis.  Diuretics were decreased as concern for possible dehydration or at least aggressive diuresis was contributing to symptoms.    Patient describes not feeling much improvement but has noticed significant hypotension at baseline.  He describes getting in to the shower.  He describes having a syncopal episode in the shower.  Patient underwent testing here.  Echo suggested an EF stable at 35 to 40%.  Grade 1 diastolic dysfunction.  Patient is currently on 20 mg of Lasix.  Carotid demonstrated nonobstructive  disease bilaterally.    He has occasional sharp chest discomfort.  No progressive chest pain.  He does not describe significant palpitations.  Recent interrogation in November 2019 suggest one episode of nonsustained V. tach otherwise was normal.      Current Outpatient Medications on File Prior to Visit   Medication Sig Dispense Refill   • aspirin 81 MG tablet Take 1 tablet by mouth Daily. 30 tablet 11   • busPIRone (BUSPAR) 10 MG tablet Take 10 mg by mouth 2 (Two) Times a Day.     • carvedilol (COREG) 6.25 MG tablet Take 1 tablet by mouth 2 (Two) Times a Day. (Patient taking differently: Take 3.125 mg by mouth 2 (Two) Times a Day.) 60 tablet 11   • dicyclomine (BENTYL) 10 MG capsule Take 1 capsule by mouth Daily. 30 capsule 11   • fludrocortisone 0.1 MG tablet Take 1 tablet by mouth Daily. 30 tablet 5   • FLUOXETINE HCL PO Take 40 mg by mouth Daily.     • Fluticasone Furoate-Vilanterol (BREO ELLIPTA IN) Inhale 2 puffs Daily As Needed.     • furosemide (LASIX) 40 MG tablet TAKE 1 TABLET EVERY DAY (Patient taking differently: Takes 1/2 tablet qd) 90 tablet 3   • gabapentin (NEURONTIN) 300 MG capsule 2 (Two) Times a Day.  2   • HYDROcodone-acetaminophen (NORCO) 5-325 MG per tablet Take 1 tablet by mouth As Needed.  0   • ibuprofen (ADVIL,MOTRIN) 800 MG tablet prn     • linaclotide (LINZESS) 72 MCG capsule capsule Take 72 mcg by mouth Every Morning Before Breakfast. Takes 2 capsules daily     • methocarbamol (ROBAXIN) 750 MG tablet 3 (Three) Times a Day.  1   • nitroglycerin (NITROSTAT) 0.4 MG SL tablet 1 under the tongue as needed for angina, may repeat q5mins for up three doses 100 tablet 11   • pantoprazole (PROTONIX) 40 MG EC tablet Take 1 tablet by mouth Daily. 90 tablet 3   • potassium chloride (K-DUR) 10 MEQ CR tablet Take 1 tablet by mouth Daily. 90 tablet 3   • simvastatin (ZOCOR) 20 MG tablet Take 1 tablet by mouth Every Night. 90 tablet 3   • sotalol (BETAPACE) 120 MG tablet Take 1 tablet by mouth 2 (Two)  Times a Day. 60 tablet 11   • tamsulosin (FLOMAX) 0.4 MG capsule 24 hr capsule Take 1 capsule by mouth Daily.  1   • traZODone (DESYREL) 100 MG tablet Take 200 mg by mouth Every Night.     • warfarin (COUMADIN) 5 MG tablet Take 1 tablet by mouth Daily. PCP follows.  Patient takes 7.5 mg 3 x week and 5 mg all other day's (Patient taking differently: Take 5 mg by mouth Daily. PCP follows.  Patient takes 10 mg 3 x week and 5 mg all other day's) 30 tablet 11   • [DISCONTINUED] sacubitril-valsartan (ENTRESTO) 24-26 MG tablet Take 1 tablet by mouth Every 12 (Twelve) Hours. 60 tablet 6   • [DISCONTINUED] metroNIDAZOLE (FLAGYL) 500 MG tablet Take 500 mg by mouth 3 (Three) Times a Day.  0     No current facility-administered medications on file prior to visit.        Patient has no known allergies.    Past Medical History:   Diagnosis Date   • Atrial fibrillation (CMS/HCC)    • Diverticulitis    • Hyperlipidemia    • Hypertension    • ICD (implantable cardioverter-defibrillator) in place        Social History     Socioeconomic History   • Marital status:      Spouse name: Not on file   • Number of children: Not on file   • Years of education: Not on file   • Highest education level: Not on file   Tobacco Use   • Smoking status: Never Smoker   • Smokeless tobacco: Never Used   Substance and Sexual Activity   • Alcohol use: No   • Drug use: No   • Sexual activity: Defer       Family History   Problem Relation Age of Onset   • No Known Problems Mother    • No Known Problems Father        Review of Systems   Constitutional: Positive for fatigue (tires easily). Negative for chills and fever.   HENT: Negative.    Eyes: Positive for visual disturbance (glasses ).   Respiratory: Positive for chest tightness (Tightness ) and shortness of breath (SOA with mild exertion ).    Cardiovascular: Positive for palpitations (occasional palps ). Negative for chest pain and leg swelling.   Gastrointestinal: Positive for abdominal  "distention, diarrhea, nausea and vomiting. Negative for blood in stool.        Diverticulitis flare right now    Endocrine: Negative.  Negative for cold intolerance and heat intolerance.   Genitourinary: Negative.    Musculoskeletal: Positive for back pain (low back pain ). Negative for arthralgias.   Skin: Negative.  Negative for rash and wound.   Allergic/Immunologic: Negative.  Negative for environmental allergies and food allergies.   Neurological: Positive for syncope (Passed out twice in the last 3 weeks. Had stood up to go to shower when the first happened, the 2nd time was while doing activities at [employer]. ), weakness and light-headedness (when standing up and bending over ). Negative for dizziness.   Hematological: Bruises/bleeds easily (bruises easily).   Psychiatric/Behavioral: Positive for sleep disturbance (has awaken with smothering at times ).   All other systems reviewed and are negative.      Objective   Vitals:    12/04/19 0833   BP: 102/69   BP Location: Left arm   Patient Position: Sitting   Pulse: 60   SpO2: 96%   Weight: 107 kg (235 lb)   Height: 190.5 cm (75\")      /69 (BP Location: Left arm, Patient Position: Sitting)   Pulse 60   Ht 190.5 cm (75\")   Wt 107 kg (235 lb)   SpO2 96%   BMI 29.37 kg/m²     Lab Results (most recent)     None          Physical Exam   Constitutional: He is oriented to person, place, and time. He appears well-developed and well-nourished. No distress.   HENT:   Head: Normocephalic and atraumatic.   Eyes: EOM are normal. Pupils are equal, round, and reactive to light.   Neck: No JVD present.   Cardiovascular: Normal rate, regular rhythm, normal heart sounds and intact distal pulses. Exam reveals no gallop and no friction rub.   No murmur heard.  Pulmonary/Chest: Effort normal and breath sounds normal. No respiratory distress. He has no wheezes. He has no rales. He exhibits no tenderness.   Musculoskeletal: Normal range of motion. He exhibits no edema. "   Neurological: He is alert and oriented to person, place, and time. No cranial nerve deficit.   Skin: Skin is warm and dry. No rash noted. No erythema. No pallor.   Psychiatric: He has a normal mood and affect. His behavior is normal.   Nursing note and vitals reviewed.      Procedure   Procedures       Assessment/Plan     Problems Addressed this Visit        Cardiovascular and Mediastinum    Nonischemic dilated cardiomyopathy (CMS/HCC)    Nonsustained ventricular tachycardia (CMS/HCC)       Nervous and Auditory    Chest pain       Other    Chronic fatigue - Primary          Recommendation  1.  Patient is a 54-year-old male with recurrent episodes of syncope.  Patient described an episode of syncope in the shower.  I am wondering if his hypotension is a contributing factor.  I do feel that patient has an orthostatic component and that his baseline hypotension is contributing to much of his symptoms.  This is likely from medication.  I am stopping Entresto.  I discussed about discontinuing Lasix as it does not appear that he has significant failure.  However, he describes to me an episode in [place] in which he had heart failure and required Lasix therapy.  I will continue 20 mg Lasix therapy but I would like to stop Entresto.  Hopefully we can continue carvedilol because of his cardiomyopathy and hopes that it can help stabilize his EF.  He will call back in 1 week in regard to symptom check.  2.  Otherwise chest pain is atypical and patient has had most normal coronaries.  Patient with one episode of nonsustained V. tach but I do not feel that that is the cause of patient's syncopal episodes or feel that it is related to his blood pressure.  3.  Chronic fatigue.  I hope that this improves after adjustment of medication and I would like to see him back closely.  He will follow with primary as scheduled call our office for any change in symptoms              Patient's Body mass index is 29.37 kg/m². BMI is above  normal parameters. Recommendations include: educational material and referral to primary care.       Electronically signed by:

## 2019-12-04 NOTE — PATIENT INSTRUCTIONS
"Fat and Cholesterol Restricted Eating Plan  Getting too much fat and cholesterol in your diet may cause health problems. Choosing the right foods helps keep your fat and cholesterol at normal levels. This can keep you from getting certain diseases.  Your doctor may recommend an eating plan that includes:  · Total fat: ______% or less of total calories a day.  · Saturated fat: ______% or less of total calories a day.  · Cholesterol: less than _________mg a day.  · Fiber: ______g a day.  What are tips for following this plan?  Meal planning  · At meals, divide your plate into four equal parts:  ? Fill one-half of your plate with vegetables and green salads.  ? Fill one-fourth of your plate with whole grains.  ? Fill one-fourth of your plate with low-fat (lean) protein foods.  · Eat fish that is high in omega-3 fats at least two times a week. This includes mackerel, tuna, sardines, and salmon.  · Eat foods that are high in fiber, such as whole grains, beans, apples, broccoli, carrots, peas, and barley.  General tips    · Work with your doctor to lose weight if you need to.  · Avoid:  ? Foods with added sugar.  ? Fried foods.  ? Foods with partially hydrogenated oils.  · Limit alcohol intake to no more than 1 drink a day for nonpregnant women and 2 drinks a day for men. One drink equals 12 oz of beer, 5 oz of wine, or 1½ oz of hard liquor.  Reading food labels  · Check food labels for:  ? Trans fats.  ? Partially hydrogenated oils.  ? Saturated fat (g) in each serving.  ? Cholesterol (mg) in each serving.  ? Fiber (g) in each serving.  · Choose foods with healthy fats, such as:  ? Monounsaturated fats.  ? Polyunsaturated fats.  ? Omega-3 fats.  · Choose grain products that have whole grains. Look for the word \"whole\" as the first word in the ingredient list.  Cooking  · Cook foods using low-fat methods. These include baking, boiling, grilling, and broiling.  · Eat more home-cooked foods. Eat at restaurants and buffets " less often.  · Avoid cooking using saturated fats, such as butter, cream, palm oil, palm kernel oil, and coconut oil.  Recommended foods    Fruits  · All fresh, canned (in natural juice), or frozen fruits.  Vegetables  · Fresh or frozen vegetables (raw, steamed, roasted, or grilled). Green salads.  Grains  · Whole grains, such as whole wheat or whole grain breads, crackers, cereals, and pasta. Unsweetened oatmeal, bulgur, barley, quinoa, or brown rice. Corn or whole wheat flour tortillas.  Meats and other protein foods  · Ground beef (85% or leaner), grass-fed beef, or beef trimmed of fat. Skinless chicken or turkey. Ground chicken or turkey. Pork trimmed of fat. All fish and seafood. Egg whites. Dried beans, peas, or lentils. Unsalted nuts or seeds. Unsalted canned beans. Nut butters without added sugar or oil.  Dairy  · Low-fat or nonfat dairy products, such as skim or 1% milk, 2% or reduced-fat cheeses, low-fat and fat-free ricotta or cottage cheese, or plain low-fat and nonfat yogurt.  Fats and oils  · Tub margarine without trans fats. Light or reduced-fat mayonnaise and salad dressings. Avocado. Olive, canola, sesame, or safflower oils.  The items listed above may not be a complete list of foods and beverages you can eat. Contact a dietitian for more information.  Foods to avoid  Fruits  · Canned fruit in heavy syrup. Fruit in cream or butter sauce. Fried fruit.  Vegetables  · Vegetables cooked in cheese, cream, or butter sauce. Fried vegetables.  Grains  · White bread. White pasta. White rice. Cornbread. Bagels, pastries, and croissants. Crackers and snack foods that contain trans fat and hydrogenated oils.  Meats and other protein foods  · Fatty cuts of meat. Ribs, chicken wings, chaves, sausage, bologna, salami, chitterlings, fatback, hot dogs, bratwurst, and packaged lunch meats. Liver and organ meats. Whole eggs and egg yolks. Chicken and turkey with skin. Fried meat.  Dairy  · Whole or 2% milk, cream,  half-and-half, and cream cheese. Whole milk cheeses. Whole-fat or sweetened yogurt. Full-fat cheeses. Nondairy creamers and whipped toppings. Processed cheese, cheese spreads, and cheese curds.  Beverages  · Alcohol. Sugar-sweetened drinks such as sodas, lemonade, and fruit drinks.  Fats and oils  · Butter, stick margarine, lard, shortening, ghee, or chaves fat. Coconut, palm kernel, and palm oils.  Sweets and desserts  · Corn syrup, sugars, honey, and molasses. Candy. Jam and jelly. Syrup. Sweetened cereals. Cookies, pies, cakes, donuts, muffins, and ice cream.  The items listed above may not be a complete list of foods and beverages you should avoid. Contact a dietitian for more information.  Summary  · Choosing the right foods helps keep your fat and cholesterol at normal levels. This can keep you from getting certain diseases.  · At meals, fill one-half of your plate with vegetables and green salads.  · Eat high-fiber foods, like whole grains, beans, apples, carrots, peas, and barley.  · Limit added sugar, saturated fats, alcohol, and fried foods.  This information is not intended to replace advice given to you by your health care provider. Make sure you discuss any questions you have with your health care provider.  Document Released: 06/18/2013 Document Revised: 08/21/2019 Document Reviewed: 09/04/2018  [employer] Interactive Patient Education © 2019 [employer]  BMI for Adults    Body mass index (BMI) is a number that is calculated from a person's weight and height. BMI may help to estimate how much of a person's weight is composed of fat. BMI can help identify those who may be at higher risk for certain medical problems.  How is BMI used with adults?  BMI is used as a screening tool to identify possible weight problems. It is used to check whether a person is obese, overweight, healthy weight, or underweight.  How is BMI calculated?  BMI measures your weight and compares it to your height. This can be done  "either in English ([place] or metric measurements. Note that charts are available to help you find your BMI quickly and easily without having to do these calculations yourself.  To calculate your BMI in English ([place] measurements, your health care provider will:  1. Measure your weight in pounds (lb).  2. Multiply the number of pounds by 703.  ? For example, for a person who weighs 180 lb, multiply that number by 703, which equals 126,540.  3. Measure your height in inches (in). Then multiply that number by itself to get a measurement called \"inches squared.\"  ? For example, for a person who is 70 in tall, the \"inches squared\" measurement is 70 in x 70 in, which equals 4900 inches squared.  4. Divide the total from Step 2 (number of lb x 703) by the total from Step 3 (inches squared): 126,540 ÷ 4900 = 25.8. This is your BMI.  To calculate your BMI in metric measurements, your health care provider will:  1. Measure your weight in kilograms (kg).  2. Measure your height in meters (m). Then multiply that number by itself to get a measurement called \"meters squared.\"  ? For example, for a person who is 1.75 m tall, the \"meters squared\" measurement is 1.75 m x 1.75 m, which is equal to 3.1 meters squared.  3. Divide the number of kilograms (your weight) by the meters squared number. In this example: 70 ÷ 3.1 = 22.6. This is your BMI.  How is BMI interpreted?  To interpret your results, your health care provider will use BMI charts to identify whether you are underweight, normal weight, overweight, or obese. The following guidelines will be used:  · Underweight: BMI less than 18.5.  · Normal weight: BMI between 18.5 and 24.9.  · Overweight: BMI between 25 and 29.9.  · Obese: BMI of 30 and above.  Please note:  · Weight includes both fat and muscle, so someone with a muscular build, such as an athlete, may have a BMI that is higher than 24.9. In cases like these, BMI is not an accurate measure of body fat.  · To determine " if excess body fat is the cause of a BMI of 25 or higher, further assessments may need to be done by a health care provider.  · BMI is usually interpreted in the same way for men and women.  Why is BMI a useful tool?  BMI is useful in two ways:  · Identifying a weight problem that may be related to a medical condition, or that may increase the risk for medical problems.  · Promoting lifestyle and diet changes in order to reach a healthy weight.  Summary  · Body mass index (BMI) is a number that is calculated from a person's weight and height.  · BMI may help to estimate how much of a person's weight is composed of fat. BMI can help identify those who may be at higher risk for certain medical problems.  · BMI can be measured using English measurements or metric measurements.  · To interpret your results, your health care provider will use BMI charts to identify whether you are underweight, normal weight, overweight, or obese.  This information is not intended to replace advice given to you by your health care provider. Make sure you discuss any questions you have with your health care provider.  Document Released: 08/29/2005 Document Revised: 10/31/2018 Document Reviewed: 10/31/2018  [email or web address] Interactive Patient Education © 2019 [employer]

## 2019-12-20 ENCOUNTER — TELEPHONE (OUTPATIENT)
Dept: CARDIOLOGY | Facility: CLINIC | Age: 54
End: 2019-12-20

## 2019-12-20 NOTE — TELEPHONE ENCOUNTER
receieved clearance request from Dr. Dunlap for pt to undergo colonoscopy and egd scheduled 1/10/20. Information given to Sofya Nuno MA.

## 2019-12-26 ENCOUNTER — TELEPHONE (OUTPATIENT)
Dept: CARDIOLOGY | Facility: CLINIC | Age: 54
End: 2019-12-26

## 2019-12-26 NOTE — TELEPHONE ENCOUNTER
Called pt to check on his syncope issues. Pt stated that the last time that he had an episode was on 12/19/2019.  Pt also stated that his BP has been running 100/110 over 60/70. Informed pt that we would let his provider look over his information before finishing his gastroenterology clearance.  SERG;NNIGA

## 2020-01-07 ENCOUNTER — TELEPHONE (OUTPATIENT)
Dept: CARDIOLOGY | Facility: CLINIC | Age: 55
End: 2020-01-07

## 2020-02-03 ENCOUNTER — TELEPHONE (OUTPATIENT)
Dept: CARDIOLOGY | Facility: CLINIC | Age: 55
End: 2020-02-03

## 2020-02-03 NOTE — TELEPHONE ENCOUNTER
[facility] clinic called stating they had questions regd pt's coumadin for an upcoming surgery. I informed her that it doesn't look like a surgical; clearance has been done for pt, so i'm unsure of the plan for his coumadin. I advised her that we would need to receive a clearance and have a provide review it for that info, she verbalized understanding.

## 2020-02-05 ENCOUNTER — TELEPHONE (OUTPATIENT)
Dept: CARDIOLOGY | Facility: CLINIC | Age: 55
End: 2020-02-05

## 2020-02-05 NOTE — TELEPHONE ENCOUNTER
CRISTINA FROM DR. SHEEHAN CALLED ASKING ABOUT CLEARANCE. SURGERY IS Friday 02/07/2018, PT SELF STOPPED COUMADIN ON Saturday 02/01/2020. PLEASE GET BACK WITH HER OR SEND CLEARANCE-   PHONE: [phone]  FAX: [phone]

## 2020-02-06 NOTE — TELEPHONE ENCOUNTER
Pt called stating he needs clearance handled ASAP on letterhead as he has surgery tomorrow.     Evelin stated Susu was working on clearance.    I informed pt that we would get clearance faxed over by the end of the day today.

## 2020-05-07 RX ORDER — SIMVASTATIN 20 MG
20 TABLET ORAL NIGHTLY
Qty: 90 TABLET | Refills: 3 | Status: SHIPPED | OUTPATIENT
Start: 2020-05-07 | End: 2020-09-17 | Stop reason: SDUPTHER

## 2020-06-08 ENCOUNTER — TELEPHONE (OUTPATIENT)
Dept: CARDIOLOGY | Facility: CLINIC | Age: 55
End: 2020-06-08

## 2020-06-09 ENCOUNTER — OFFICE VISIT (OUTPATIENT)
Dept: CARDIOLOGY | Facility: CLINIC | Age: 55
End: 2020-06-09

## 2020-06-09 VITALS
OXYGEN SATURATION: 98 % | DIASTOLIC BLOOD PRESSURE: 72 MMHG | TEMPERATURE: 97.1 F | HEIGHT: 75 IN | WEIGHT: 232 LBS | BODY MASS INDEX: 28.85 KG/M2 | HEART RATE: 70 BPM | SYSTOLIC BLOOD PRESSURE: 106 MMHG

## 2020-06-09 DIAGNOSIS — R07.2 PRECORDIAL PAIN: Primary | ICD-10-CM

## 2020-06-09 DIAGNOSIS — I10 ESSENTIAL HYPERTENSION: ICD-10-CM

## 2020-06-09 DIAGNOSIS — I42.0 DILATED CARDIOMYOPATHY (HCC): ICD-10-CM

## 2020-06-09 DIAGNOSIS — R60.9 EDEMA, UNSPECIFIED TYPE: ICD-10-CM

## 2020-06-09 DIAGNOSIS — R06.02 SHORTNESS OF BREATH: ICD-10-CM

## 2020-06-09 PROCEDURE — 93000 ELECTROCARDIOGRAM COMPLETE: CPT | Performed by: PHYSICIAN ASSISTANT

## 2020-06-09 PROCEDURE — 99214 OFFICE O/P EST MOD 30 MIN: CPT | Performed by: PHYSICIAN ASSISTANT

## 2020-06-09 RX ORDER — FUROSEMIDE 20 MG/1
20 TABLET ORAL DAILY
Qty: 90 TABLET | Refills: 3 | Status: SHIPPED | OUTPATIENT
Start: 2020-06-09 | End: 2021-03-15 | Stop reason: ALTCHOICE

## 2020-06-09 RX ORDER — WARFARIN SODIUM 5 MG/1
TABLET ORAL
Qty: 30 TABLET | Refills: 5 | Status: SHIPPED | OUTPATIENT
Start: 2020-06-09 | End: 2022-02-09 | Stop reason: ALTCHOICE

## 2020-06-09 RX ORDER — FLUDROCORTISONE ACETATE 0.1 MG/1
0.1 TABLET ORAL DAILY
Qty: 90 TABLET | Refills: 3 | Status: SHIPPED | OUTPATIENT
Start: 2020-06-09

## 2020-06-09 NOTE — PROGRESS NOTES
Problem list     Subjective   Bill Umana is a 55 y.o. male     Chief Complaint   Patient presents with   • Cardiomyopathy     Here for a 6 month follow up    • Cardiac clearance     Will be having a myelogram by [facility]. Pending scheduling     Problem list  1.  History of nonischemic dilated cardiomyopathy status post AICD implant ([employer])   1.1 recent interrogation demonstrates 4 episodes of nonsustained ventricular tachycardia and one episode of ventricular tachycardia converted with ATP without defibrillation  1.2 patient on Sotalol for ventricular tachycardia followed by EP services  2.  Chronic systolic heart failure  2.1 EF estimated at 35-40% by echocardiogram February 2018  2.2 EF estimated in the low 40s post stress December 2018  2.3 EF by ventriculography during catheterization March 2018 at 40-45%  2.4 EF by echocardiogram November 2019 at 35 to 40% with grade 1 diastolic dysfunction  3.  Atrial fibrillation on Coumadin therapy  4.  Dyslipidemia  5.  GERD  6.  Mild mitral and tricuspid regurgitation  7.  Cardiac catheterization March 2018 with normal coronaries and an EF estimated at 40-45%  8.  Nonobstructive carotid artery stenosis with 16 to 49% bilateral internal carotid artery stenosis by duplex November 2019     HPI  This gentleman presents in today for evaluation.  He requests clearance prior to myelogram which will be performed by neurosurgical services.  This gentleman did have evaluation through this office in November.  At that time, he was scheduled for an echo and a carotid duplex.  His echocardiogram suggested an EF at 35 to 40% with no significant valvular lesions.  Carotid duplex indicated less than 50% stenosis in bilateral carotid systems.  Symptomatically, the patient has noted increasing chest discomfort recently.  He notes mild chest tightness with exertion or when stressed.  He has ongoing dyspnea.  He has had no failure or dysrhythmic  symptoms.  He has no further complaints.    Current Outpatient Medications on File Prior to Visit   Medication Sig Dispense Refill   • aspirin 81 MG tablet Take 1 tablet by mouth Daily. 30 tablet 11   • busPIRone (BUSPAR) 10 MG tablet Take 10 mg by mouth 2 (Two) Times a Day.     • carvedilol (COREG) 6.25 MG tablet Take 1 tablet by mouth 2 (Two) Times a Day. (Patient taking differently: Take 3.125 mg by mouth 2 (Two) Times a Day.) 60 tablet 11   • dicyclomine (BENTYL) 10 MG capsule Take 1 capsule by mouth Daily. 30 capsule 11   • FLUOXETINE HCL PO Take 40 mg by mouth Daily.     • Fluticasone Furoate-Vilanterol (BREO ELLIPTA IN) Inhale 2 puffs Daily As Needed.     • gabapentin (NEURONTIN) 300 MG capsule 2 (Two) Times a Day.  2   • HYDROcodone-acetaminophen (NORCO) 5-325 MG per tablet Take 1 tablet by mouth As Needed.  0   • ibuprofen (ADVIL,MOTRIN) 800 MG tablet prn     • linaclotide (LINZESS) 72 MCG capsule capsule Take 72 mcg by mouth Every Morning Before Breakfast. Takes 2 capsules daily     • methocarbamol (ROBAXIN) 750 MG tablet 3 (Three) Times a Day.  1   • nitroglycerin (NITROSTAT) 0.4 MG SL tablet 1 under the tongue as needed for angina, may repeat q5mins for up three doses 100 tablet 11   • pantoprazole (PROTONIX) 40 MG EC tablet Take 1 tablet by mouth Daily. 90 tablet 3   • potassium chloride (K-DUR) 10 MEQ CR tablet Take 1 tablet by mouth Daily. 90 tablet 3   • simvastatin (ZOCOR) 20 MG tablet TAKE 1 TABLET BY MOUTH EVERY NIGHT. 90 tablet 3   • sotalol (BETAPACE) 120 MG tablet Take 1 tablet by mouth 2 (Two) Times a Day. 60 tablet 11   • tamsulosin (FLOMAX) 0.4 MG capsule 24 hr capsule Take 1 capsule by mouth Daily.  1   • traZODone (DESYREL) 100 MG tablet Take 200 mg by mouth Every Night.       No current facility-administered medications on file prior to visit.        Patient has no known allergies.    Past Medical History:   Diagnosis Date   • Atrial fibrillation (CMS/HCC)    • Diverticulitis    •  Hyperlipidemia    • Hypertension    • ICD (implantable cardioverter-defibrillator) in place        Social History     Socioeconomic History   • Marital status:      Spouse name: Not on file   • Number of children: Not on file   • Years of education: Not on file   • Highest education level: Not on file   Tobacco Use   • Smoking status: Never Smoker   • Smokeless tobacco: Never Used   Substance and Sexual Activity   • Alcohol use: No   • Drug use: No   • Sexual activity: Defer       Family History   Problem Relation Age of Onset   • No Known Problems Mother    • No Known Problems Father        Review of Systems   Constitutional: Positive for fatigue. Negative for chills and fever.   HENT: Negative.  Negative for congestion, rhinorrhea and sore throat.    Eyes: Positive for visual disturbance (glasses daily ).   Respiratory: Positive for chest tightness (some pressure ) and shortness of breath (Increased SOA with exertion and sometimes at rest ).    Cardiovascular: Positive for palpitations (racing at times ) and leg swelling (Puffiness around ankles which resolves overnight ). Negative for chest pain.   Gastrointestinal: Positive for abdominal pain (since colon surgery ) and nausea. Negative for blood in stool and vomiting.   Endocrine: Negative.  Negative for cold intolerance and heat intolerance.   Genitourinary: Negative.  Negative for dysuria, frequency, hematuria and urgency.   Musculoskeletal: Positive for back pain (low back pain ). Negative for arthralgias.   Skin: Negative.  Negative for rash and wound.   Allergic/Immunologic: Positive for environmental allergies (seasonal ). Negative for food allergies.   Neurological: Positive for syncope (2 syncopal episodes since last visit 6 months ago ) and light-headedness (when standing too fast ). Negative for dizziness and weakness.   Hematological: Bruises/bleeds easily.   Psychiatric/Behavioral: Positive for sleep disturbance (Has been waking with  "smothering at times ). Negative for agitation and confusion. The patient is nervous/anxious.        Objective   Vitals:    06/09/20 1004   BP: 106/72   BP Location: Left arm   Patient Position: Sitting   Pulse: 70   Temp: 97.1 °F (36.2 °C)   SpO2: 98%   Weight: 105 kg (232 lb)   Height: 190.5 cm (75\")      /72 (BP Location: Left arm, Patient Position: Sitting)   Pulse 70   Temp 97.1 °F (36.2 °C)   Ht 190.5 cm (75\")   Wt 105 kg (232 lb)   SpO2 98%   BMI 29.00 kg/m²    Lab Results (most recent)     None        Physical Exam   Constitutional: He is oriented to person, place, and time. He appears well-developed and well-nourished. No distress.   HENT:   Head: Normocephalic and atraumatic.   Eyes: Pupils are equal, round, and reactive to light. EOM are normal.   Neck: No JVD present.   Cardiovascular: Normal rate, regular rhythm, normal heart sounds and intact distal pulses. Exam reveals no gallop and no friction rub.   No murmur heard.  Pulmonary/Chest: Effort normal and breath sounds normal. No respiratory distress. He has no wheezes. He has no rales. He exhibits no tenderness.   Musculoskeletal: Normal range of motion. He exhibits no edema.   Neurological: He is alert and oriented to person, place, and time. No cranial nerve deficit.   Skin: Skin is warm and dry. No rash noted. No erythema. No pallor.   Psychiatric: He has a normal mood and affect. His behavior is normal.   Nursing note and vitals reviewed.        Procedure     ECG 12 Lead  Date/Time: 6/9/2020 10:06 AM  Performed by: Solo Jackson PA  Authorized by: Solo Jackson PA   Comparison: compared with previous ECG from 10/24/2019  Comparison to previous ECG: Artifact at baseline, but in short, sinus rhythm at 56, normal axis, early precordial R wave progression, minor nonspecific ST and T wave changes, no acute changes noted.                 Assessment/Plan      Diagnosis Plan   1. Precordial pain  fludrocortisone 0.1 MG tablet    Stress " Test With Myocardial Perfusion One Day   2. Shortness of breath  furosemide (LASIX) 20 MG tablet    Stress Test With Myocardial Perfusion One Day   3. Essential hypertension  ECG 12 Lead    Stress Test With Myocardial Perfusion One Day   4. Edema, unspecified type  furosemide (LASIX) 20 MG tablet    Stress Test With Myocardial Perfusion One Day   5. Dilated cardiomyopathy (CMS/HCC)  furosemide (LASIX) 20 MG tablet    Stress Test With Myocardial Perfusion One Day     1.  Given the patient's complaints of chest discomfort, I do feel that he should have evaluation via nuclear stress test before any surgical procedures are considered.  In that setting, I will schedule for nuclear stress test for risk stratification and ischemia assessment in the pre-op setting.    2.  The patient did have recent echo and carotid duplex findings which were largely benign all as outlined above.   I have reviewed those in detail with the patient today.    3.  He does request refills on medications and we have sent that to his pharmacy.  I would continue medical regimen for now without change.    4.  We will see the patient back to review study results and recommend him further on his surgical status at that time.  He will call for any issues prior to follow-up.           Bill Umana  reports that he has never smoked. He has never used smokeless tobacco..        Patient's Body mass index is 29 kg/m². BMI is above normal parameters. Recommendations include: educational material and referral to primary care.         Electronically signed by:

## 2020-06-09 NOTE — PATIENT INSTRUCTIONS
"Fat and Cholesterol Restricted Eating Plan  Getting too much fat and cholesterol in your diet may cause health problems. Choosing the right foods helps keep your fat and cholesterol at normal levels. This can keep you from getting certain diseases.  Your doctor may recommend an eating plan that includes:  · Total fat: ______% or less of total calories a day.  · Saturated fat: ______% or less of total calories a day.  · Cholesterol: less than _________mg a day.  · Fiber: ______g a day.  What are tips for following this plan?  Meal planning  · At meals, divide your plate into four equal parts:  ? Fill one-half of your plate with vegetables and green salads.  ? Fill one-fourth of your plate with whole grains.  ? Fill one-fourth of your plate with low-fat (lean) protein foods.  · Eat fish that is high in omega-3 fats at least two times a week. This includes mackerel, tuna, sardines, and salmon.  · Eat foods that are high in fiber, such as whole grains, beans, apples, broccoli, carrots, peas, and barley.  General tips    · Work with your doctor to lose weight if you need to.  · Avoid:  ? Foods with added sugar.  ? Fried foods.  ? Foods with partially hydrogenated oils.  · Limit alcohol intake to no more than 1 drink a day for nonpregnant women and 2 drinks a day for men. One drink equals 12 oz of beer, 5 oz of wine, or 1½ oz of hard liquor.  Reading food labels  · Check food labels for:  ? Trans fats.  ? Partially hydrogenated oils.  ? Saturated fat (g) in each serving.  ? Cholesterol (mg) in each serving.  ? Fiber (g) in each serving.  · Choose foods with healthy fats, such as:  ? Monounsaturated fats.  ? Polyunsaturated fats.  ? Omega-3 fats.  · Choose grain products that have whole grains. Look for the word \"whole\" as the first word in the ingredient list.  Cooking  · Cook foods using low-fat methods. These include baking, boiling, grilling, and broiling.  · Eat more home-cooked foods. Eat at restaurants and buffets " less often.  · Avoid cooking using saturated fats, such as butter, cream, palm oil, palm kernel oil, and coconut oil.  Recommended foods    Fruits  · All fresh, canned (in natural juice), or frozen fruits.  Vegetables  · Fresh or frozen vegetables (raw, steamed, roasted, or grilled). Green salads.  Grains  · Whole grains, such as whole wheat or whole grain breads, crackers, cereals, and pasta. Unsweetened oatmeal, bulgur, barley, quinoa, or brown rice. Corn or whole wheat flour tortillas.  Meats and other protein foods  · Ground beef (85% or leaner), grass-fed beef, or beef trimmed of fat. Skinless chicken or turkey. Ground chicken or turkey. Pork trimmed of fat. All fish and seafood. Egg whites. Dried beans, peas, or lentils. Unsalted nuts or seeds. Unsalted canned beans. Nut butters without added sugar or oil.  Dairy  · Low-fat or nonfat dairy products, such as skim or 1% milk, 2% or reduced-fat cheeses, low-fat and fat-free ricotta or cottage cheese, or plain low-fat and nonfat yogurt.  Fats and oils  · Tub margarine without trans fats. Light or reduced-fat mayonnaise and salad dressings. Avocado. Olive, canola, sesame, or safflower oils.  The items listed above may not be a complete list of foods and beverages you can eat. Contact a dietitian for more information.  Foods to avoid  Fruits  · Canned fruit in heavy syrup. Fruit in cream or butter sauce. Fried fruit.  Vegetables  · Vegetables cooked in cheese, cream, or butter sauce. Fried vegetables.  Grains  · White bread. White pasta. White rice. Cornbread. Bagels, pastries, and croissants. Crackers and snack foods that contain trans fat and hydrogenated oils.  Meats and other protein foods  · Fatty cuts of meat. Ribs, chicken wings, chaves, sausage, bologna, salami, chitterlings, fatback, hot dogs, bratwurst, and packaged lunch meats. Liver and organ meats. Whole eggs and egg yolks. Chicken and turkey with skin. Fried meat.  Dairy  · Whole or 2% milk, cream,  half-and-half, and cream cheese. Whole milk cheeses. Whole-fat or sweetened yogurt. Full-fat cheeses. Nondairy creamers and whipped toppings. Processed cheese, cheese spreads, and cheese curds.  Beverages  · Alcohol. Sugar-sweetened drinks such as sodas, lemonade, and fruit drinks.  Fats and oils  · Butter, stick margarine, lard, shortening, ghee, or chaves fat. Coconut, palm kernel, and palm oils.  Sweets and desserts  · Corn syrup, sugars, honey, and molasses. Candy. Jam and jelly. Syrup. Sweetened cereals. Cookies, pies, cakes, donuts, muffins, and ice cream.  The items listed above may not be a complete list of foods and beverages you should avoid. Contact a dietitian for more information.  Summary  · Choosing the right foods helps keep your fat and cholesterol at normal levels. This can keep you from getting certain diseases.  · At meals, fill one-half of your plate with vegetables and green salads.  · Eat high-fiber foods, like whole grains, beans, apples, carrots, peas, and barley.  · Limit added sugar, saturated fats, alcohol, and fried foods.  This information is not intended to replace advice given to you by your health care provider. Make sure you discuss any questions you have with your health care provider.  Document Released: 06/18/2013 Document Revised: 08/21/2019 Document Reviewed: 09/04/2018  Elsevier Patient Education © 2020 [employer]  BMI for Adults    Body mass index (BMI) is a number that is calculated from a person's weight and height. BMI may help to estimate how much of a person's weight is composed of fat. BMI can help identify those who may be at higher risk for certain medical problems.  How is BMI used with adults?  BMI is used as a screening tool to identify possible weight problems. It is used to check whether a person is obese, overweight, healthy weight, or underweight.  How is BMI calculated?  BMI measures your weight and compares it to your height. This can be done either in  "English ([place] or metric measurements. Note that charts are available to help you find your BMI quickly and easily without having to do these calculations yourself.  To calculate your BMI in English ([place] measurements, your health care provider will:  1. Measure your weight in pounds (lb).  2. Multiply the number of pounds by 703.  ? For example, for a person who weighs 180 lb, multiply that number by 703, which equals 126,540.  3. Measure your height in inches (in). Then multiply that number by itself to get a measurement called \"inches squared.\"  ? For example, for a person who is 70 in tall, the \"inches squared\" measurement is 70 in x 70 in, which equals 4900 inches squared.  4. Divide the total from Step 2 (number of lb x 703) by the total from Step 3 (inches squared): 126,540 ÷ 4900 = 25.8. This is your BMI.  To calculate your BMI in metric measurements, your health care provider will:  1. Measure your weight in kilograms (kg).  2. Measure your height in meters (m). Then multiply that number by itself to get a measurement called \"meters squared.\"  ? For example, for a person who is 1.75 m tall, the \"meters squared\" measurement is 1.75 m x 1.75 m, which is equal to 3.1 meters squared.  3. Divide the number of kilograms (your weight) by the meters squared number. In this example: 70 ÷ 3.1 = 22.6. This is your BMI.  How is BMI interpreted?  To interpret your results, your health care provider will use BMI charts to identify whether you are underweight, normal weight, overweight, or obese. The following guidelines will be used:  · Underweight: BMI less than 18.5.  · Normal weight: BMI between 18.5 and 24.9.  · Overweight: BMI between 25 and 29.9.  · Obese: BMI of 30 and above.  Please note:  · Weight includes both fat and muscle, so someone with a muscular build, such as an athlete, may have a BMI that is higher than 24.9. In cases like these, BMI is not an accurate measure of body fat.  · To determine if excess " body fat is the cause of a BMI of 25 or higher, further assessments may need to be done by a health care provider.  · BMI is usually interpreted in the same way for men and women.  Why is BMI a useful tool?  BMI is useful in two ways:  · Identifying a weight problem that may be related to a medical condition, or that may increase the risk for medical problems.  · Promoting lifestyle and diet changes in order to reach a healthy weight.  Summary  · Body mass index (BMI) is a number that is calculated from a person's weight and height.  · BMI may help to estimate how much of a person's weight is composed of fat. BMI can help identify those who may be at higher risk for certain medical problems.  · BMI can be measured using English measurements or metric measurements.  · To interpret your results, your health care provider will use BMI charts to identify whether you are underweight, normal weight, overweight, or obese.  This information is not intended to replace advice given to you by your health care provider. Make sure you discuss any questions you have with your health care provider.  Document Released: 08/29/2005 Document Revised: 11/30/2018 Document Reviewed: 10/31/2018  Elsevier Patient Education © 2020 [employer]

## 2020-06-25 RX ORDER — PANTOPRAZOLE SODIUM 40 MG/1
TABLET, DELAYED RELEASE ORAL
Qty: 90 TABLET | Refills: 3 | Status: SHIPPED | OUTPATIENT
Start: 2020-06-25 | End: 2021-05-27

## 2020-06-26 ENCOUNTER — OFFICE VISIT (OUTPATIENT)
Dept: CARDIOLOGY | Facility: CLINIC | Age: 55
End: 2020-06-26

## 2020-06-26 DIAGNOSIS — I48.0 PAROXYSMAL ATRIAL FIBRILLATION (HCC): ICD-10-CM

## 2020-06-26 DIAGNOSIS — I47.29 NONSUSTAINED VENTRICULAR TACHYCARDIA (HCC): ICD-10-CM

## 2020-06-26 PROCEDURE — 93289 INTERROG DEVICE EVAL HEART: CPT | Performed by: INTERNAL MEDICINE

## 2020-07-01 ENCOUNTER — HOSPITAL ENCOUNTER (OUTPATIENT)
Dept: CARDIOLOGY | Facility: HOSPITAL | Age: 55
Discharge: HOME | End: 2020-07-01

## 2020-07-01 DIAGNOSIS — I10 ESSENTIAL HYPERTENSION: ICD-10-CM

## 2020-07-01 DIAGNOSIS — R06.02 SHORTNESS OF BREATH: ICD-10-CM

## 2020-07-01 DIAGNOSIS — R07.2 PRECORDIAL PAIN: ICD-10-CM

## 2020-07-01 DIAGNOSIS — I42.0 DILATED CARDIOMYOPATHY (HCC): ICD-10-CM

## 2020-07-01 DIAGNOSIS — R60.9 EDEMA, UNSPECIFIED TYPE: ICD-10-CM

## 2020-07-01 PROCEDURE — 78452 HT MUSCLE IMAGE SPECT MULT: CPT | Performed by: INTERNAL MEDICINE

## 2020-07-01 PROCEDURE — 0 TECHNETIUM SESTAMIBI: Performed by: INTERNAL MEDICINE

## 2020-07-01 PROCEDURE — 93016 CV STRESS TEST SUPVJ ONLY: CPT | Performed by: NURSE PRACTITIONER

## 2020-07-01 PROCEDURE — 93018 CV STRESS TEST I&R ONLY: CPT | Performed by: INTERNAL MEDICINE

## 2020-07-01 PROCEDURE — 78452 HT MUSCLE IMAGE SPECT MULT: CPT

## 2020-07-01 PROCEDURE — A9500 TC99M SESTAMIBI: HCPCS | Performed by: INTERNAL MEDICINE

## 2020-07-01 PROCEDURE — 93017 CV STRESS TEST TRACING ONLY: CPT

## 2020-07-01 RX ADMIN — TECHNETIUM TC 99M SESTAMIBI 1 DOSE: 1 INJECTION INTRAVENOUS at 12:28

## 2020-07-01 RX ADMIN — TECHNETIUM TC 99M SESTAMIBI 1 DOSE: 1 INJECTION INTRAVENOUS at 12:29

## 2020-07-05 LAB
BH CV STRESS RECOVERY BP: NORMAL MMHG
BH CV STRESS RECOVERY HR: 71 BPM
MAXIMAL PREDICTED HEART RATE: 164 BPM
PERCENT MAX PREDICTED HR: 73.78 %
STRESS BASELINE BP: NORMAL MMHG
STRESS BASELINE HR: 60 BPM
STRESS PERCENT HR: 87 %
STRESS POST ESTIMATED WORKLOAD: 10.4 METS
STRESS POST EXERCISE DUR MIN: 9 MIN
STRESS POST EXERCISE DUR SEC: 4 SEC
STRESS POST PEAK BP: NORMAL MMHG
STRESS POST PEAK HR: 121 BPM
STRESS TARGET HR: 139 BPM

## 2020-07-07 ENCOUNTER — TELEPHONE (OUTPATIENT)
Dept: CARDIOLOGY | Facility: CLINIC | Age: 55
End: 2020-07-07

## 2020-07-07 NOTE — TELEPHONE ENCOUNTER
Patient aware of stress results.  He will keep follow up as scheduled. Evelin Zhou MA      ----- Message from ROSEANNE Goldstein sent at 7/6/2020  8:45 AM EDT -----  Routine follow-up.    Result Text           .  #1.  Superior functional capacity.  The patient developed chest pain in stage III of the Vasiliy protocol.     2.  Physiologic heart rate and blood pressure responses to exercise.     3.  Exercise EKG is uninterpretable for ischemia.  No significant ST segment depression early in recovery.  No exercise-induced dysrhythmia.     4.  No scintigraphic evidence of ischemia.     5.  Moderately depressed post-rest ejection fraction of 36% with global hypokinesis most pronounced in the inferoseptal and inferior walls.     6.  No evidence of transient ischemic dilation or of increased lung uptake of radiopharmaceutical.

## 2020-07-10 ENCOUNTER — TELEPHONE (OUTPATIENT)
Dept: CARDIOLOGY | Facility: CLINIC | Age: 55
End: 2020-07-10

## 2020-07-10 NOTE — TELEPHONE ENCOUNTER
----- Message from Janneth Ordoñez sent at 7/10/2020  9:54 AM EDT -----  Arden Feng's office contacted us needing his clearance sent back now that his testing is read.

## 2020-07-10 NOTE — TELEPHONE ENCOUNTER
Per verbal from Solo Kerns PA-C:  increased, but not prohibitive risk. Magnet may be used on ICD, if needed. May hold Coumadin x4-5 days, Continue ASA.    Waiting on clearance request, so I can put type of surgery and surgery date in the letter.

## 2020-08-04 ENCOUNTER — TELEPHONE (OUTPATIENT)
Dept: CARDIOLOGY | Facility: CLINIC | Age: 55
End: 2020-08-04

## 2020-08-05 NOTE — TELEPHONE ENCOUNTER
Per Solo: Pt is at moderate risk, may hold coumadin 4-5 days, will need to resume ASAP after procedure. Needs to continue taking Aspirin 81mg daily.       Faxed clearance to [phone]

## 2020-09-17 ENCOUNTER — OFFICE VISIT (OUTPATIENT)
Dept: CARDIOLOGY | Facility: CLINIC | Age: 55
End: 2020-09-17

## 2020-09-17 VITALS
DIASTOLIC BLOOD PRESSURE: 74 MMHG | HEART RATE: 72 BPM | WEIGHT: 236 LBS | TEMPERATURE: 97.1 F | HEIGHT: 75 IN | SYSTOLIC BLOOD PRESSURE: 122 MMHG | OXYGEN SATURATION: 98 % | BODY MASS INDEX: 29.34 KG/M2

## 2020-09-17 DIAGNOSIS — R06.02 SHORTNESS OF BREATH: ICD-10-CM

## 2020-09-17 DIAGNOSIS — I10 ESSENTIAL HYPERTENSION: ICD-10-CM

## 2020-09-17 DIAGNOSIS — I42.0 DILATED CARDIOMYOPATHY (HCC): Primary | ICD-10-CM

## 2020-09-17 DIAGNOSIS — I48.0 PAROXYSMAL ATRIAL FIBRILLATION (HCC): ICD-10-CM

## 2020-09-17 DIAGNOSIS — E78.2 MIXED HYPERLIPIDEMIA: ICD-10-CM

## 2020-09-17 PROCEDURE — 99214 OFFICE O/P EST MOD 30 MIN: CPT | Performed by: PHYSICIAN ASSISTANT

## 2020-09-17 RX ORDER — RIZATRIPTAN BENZOATE 10 MG/1
1 TABLET ORAL DAILY PRN
COMMUNITY
Start: 2020-09-02 | End: 2021-11-29

## 2020-09-17 RX ORDER — SIMVASTATIN 20 MG
20 TABLET ORAL NIGHTLY
Qty: 90 TABLET | Refills: 3 | Status: SHIPPED | OUTPATIENT
Start: 2020-09-17 | End: 2021-05-27

## 2020-09-17 RX ORDER — CARVEDILOL 6.25 MG/1
6.25 TABLET ORAL 2 TIMES DAILY
Qty: 180 TABLET | Refills: 3 | Status: SHIPPED | OUTPATIENT
Start: 2020-09-17 | End: 2021-05-28 | Stop reason: SDUPTHER

## 2020-09-17 RX ORDER — TRAMADOL HYDROCHLORIDE 50 MG/1
1 TABLET ORAL 2 TIMES DAILY PRN
COMMUNITY
Start: 2020-08-06

## 2020-09-17 RX ORDER — RAMIPRIL 2.5 MG/1
2.5 CAPSULE ORAL DAILY
Qty: 90 CAPSULE | Refills: 3 | Status: SHIPPED | OUTPATIENT
Start: 2020-09-17 | End: 2021-01-22 | Stop reason: SDUPTHER

## 2020-09-17 RX ORDER — SOTALOL HYDROCHLORIDE 120 MG/1
120 TABLET ORAL 2 TIMES DAILY
Qty: 180 TABLET | Refills: 3 | Status: SHIPPED | OUTPATIENT
Start: 2020-09-17 | End: 2023-03-24

## 2020-09-17 NOTE — PROGRESS NOTES
Problem list     Subjective   Bill Umana is a 55 y.o. male     Chief Complaint   Patient presents with   • Follow-up     Here for a 3 month follow up    Problem list  1.  History of nonischemic dilated cardiomyopathy status post AICD implant ([employer])   1.1 recent interrogation demonstrates 4 episodes of nonsustained ventricular tachycardia and one episode of ventricular tachycardia converted with ATP without defibrillation  1.2 patient on Sotalol for ventricular tachycardia followed by EP services  2.  Chronic systolic heart failure  2.1 EF estimated at 35-40% by echocardiogram February 2018  2.2 EF estimated in the low 40s post stress December 2018  2.3 EF by ventriculography during catheterization March 2018 at 40-45%  2.4 EF by echocardiogram November 2019 at 35 to 40% with grade 1 diastolic dysfunction  3.  Atrial fibrillation on Coumadin therapy  4.  Dyslipidemia  5.  GERD  6.  Mild mitral and tricuspid regurgitation  7.  Cardiac catheterization March 2018 with normal coronaries and an EF estimated at 40-45%  8.  Nonobstructive carotid artery stenosis with 16 to 49% bilateral internal carotid artery stenosis by duplex November 2019    HPI  This pleasant gentleman presents for follow-up of stress test results, but also to review clinical course otherwise.  He was scheduled for testing given chest discomfort and preoperative status.  Nuclear stress test was performed which indicated no evidence of ischemia.  There was evidence of systolic dysfunction, which is a known baseline for this gentleman.  Symptomatically, the patient reports mostly fatigue and dyspnea is his greatest concerns at this time.  These are baseline symptoms for him however and remain unchanged over the last few months.  He denies current chest pain.  Only rarely does he experience palpitations, reporting no sustained dysrhythmic activity.  He denies failure symptoms.  He currently is doing reasonably well from general  cardiovascular standpoint otherwise and has no further complaints.    Current Outpatient Medications on File Prior to Visit   Medication Sig Dispense Refill   • aspirin 81 MG tablet Take 1 tablet by mouth Daily. 30 tablet 11   • busPIRone (BUSPAR) 10 MG tablet Take 10 mg by mouth 2 (Two) Times a Day.     • dicyclomine (BENTYL) 10 MG capsule Take 1 capsule by mouth Daily. (Patient taking differently: Take 20 mg by mouth Daily.) 30 capsule 11   • fludrocortisone 0.1 MG tablet Take 1 tablet by mouth Daily. 90 tablet 3   • FLUOXETINE HCL PO Take 60 mg by mouth Daily.     • Fluticasone Furoate-Vilanterol (BREO ELLIPTA IN) Inhale 2 puffs Daily As Needed.     • furosemide (LASIX) 20 MG tablet Take 1 tablet by mouth Daily. (Patient taking differently: Take 40 mg by mouth Daily.) 90 tablet 3   • gabapentin (NEURONTIN) 300 MG capsule 2 (Two) Times a Day.  2   • HYDROcodone-acetaminophen (NORCO) 5-325 MG per tablet Take 1 tablet by mouth As Needed.  0   • ibuprofen (ADVIL,MOTRIN) 800 MG tablet prn     • linaclotide (LINZESS) 72 MCG capsule capsule Take 72 mcg by mouth Every Morning Before Breakfast. Takes 2 capsules daily     • methocarbamol (ROBAXIN) 750 MG tablet 3 (Three) Times a Day.  1   • nitroglycerin (NITROSTAT) 0.4 MG SL tablet 1 under the tongue as needed for angina, may repeat q5mins for up three doses 100 tablet 11   • pantoprazole (PROTONIX) 40 MG EC tablet TAKE 1 TABLET EVERY DAY 90 tablet 3   • potassium chloride (K-DUR) 10 MEQ CR tablet Take 1 tablet by mouth Daily. 90 tablet 3   • rizatriptan (MAXALT) 10 MG tablet Take 1 tablet by mouth Daily As Needed.     • tamsulosin (FLOMAX) 0.4 MG capsule 24 hr capsule Take 1 capsule by mouth Daily.  1   • traMADol (ULTRAM) 50 MG tablet Take 1 tablet by mouth 2 (Two) Times a Day As Needed.     • traZODone (DESYREL) 100 MG tablet Take 200 mg by mouth Every Night.     • warfarin (COUMADIN) 5 MG tablet Followed by PCP. Currently 5mg qd 30 tablet 5   • [DISCONTINUED]  carvedilol (COREG) 6.25 MG tablet Take 1 tablet by mouth 2 (Two) Times a Day. (Patient taking differently: Take 3.125 mg by mouth 2 (Two) Times a Day.) 60 tablet 11   • [DISCONTINUED] simvastatin (ZOCOR) 20 MG tablet TAKE 1 TABLET BY MOUTH EVERY NIGHT. 90 tablet 3   • [DISCONTINUED] sotalol (BETAPACE) 120 MG tablet Take 1 tablet by mouth 2 (Two) Times a Day. 60 tablet 11     No current facility-administered medications on file prior to visit.        Patient has no known allergies.    Past Medical History:   Diagnosis Date   • Atrial fibrillation (CMS/HCC)    • Diverticulitis    • Hyperlipidemia    • Hypertension    • ICD (implantable cardioverter-defibrillator) in place        Social History     Socioeconomic History   • Marital status:      Spouse name: Not on file   • Number of children: Not on file   • Years of education: Not on file   • Highest education level: Not on file   Tobacco Use   • Smoking status: Never Smoker   • Smokeless tobacco: Never Used   Substance and Sexual Activity   • Alcohol use: No   • Drug use: No   • Sexual activity: Defer       Family History   Problem Relation Age of Onset   • No Known Problems Mother    • No Known Problems Father        Review of Systems   Constitutional: Positive for fatigue (tires easily ). Negative for chills and fever.   HENT: Negative for congestion, rhinorrhea and sore throat.    Eyes: Positive for visual disturbance (glasses daily ).   Respiratory: Positive for chest tightness. Negative for cough and shortness of breath.    Cardiovascular: Positive for palpitations. Negative for chest pain and leg swelling.   Gastrointestinal: Positive for nausea. Negative for abdominal pain, blood in stool and vomiting.   Endocrine: Negative.  Negative for cold intolerance and heat intolerance.   Genitourinary: Negative for dysuria, frequency, hematuria and urgency.   Musculoskeletal: Positive for back pain (low back pain ). Negative for arthralgias.   Skin: Negative.   "Negative for rash and wound.   Allergic/Immunologic: Negative.  Negative for environmental allergies and food allergies.   Neurological: Positive for light-headedness and headaches (migraines ). Negative for dizziness and weakness.   Hematological: Bruises/bleeds easily.   Psychiatric/Behavioral: Positive for sleep disturbance (has awakened with smothering a couple of times ).       Objective   Vitals:    09/17/20 0825   BP: 122/74   BP Location: Left arm   Patient Position: Sitting   Pulse: 72   Temp: 97.1 °F (36.2 °C)   SpO2: 98%   Weight: 107 kg (236 lb)   Height: 190.5 cm (75\")      /74 (BP Location: Left arm, Patient Position: Sitting)   Pulse 72   Temp 97.1 °F (36.2 °C)   Ht 190.5 cm (75\")   Wt 107 kg (236 lb)   SpO2 98%   BMI 29.50 kg/m²    Lab Results (most recent)     None        Physical Exam  Vitals signs and nursing note reviewed.   Constitutional:       General: He is not in acute distress.     Appearance: He is well-developed.   HENT:      Head: Normocephalic and atraumatic.   Eyes:      Conjunctiva/sclera: Conjunctivae normal.      Pupils: Pupils are equal, round, and reactive to light.   Neck:      Musculoskeletal: Normal range of motion and neck supple.      Vascular: No JVD.      Trachea: No tracheal deviation.   Cardiovascular:      Rate and Rhythm: Normal rate and regular rhythm.      Heart sounds: Normal heart sounds.   Pulmonary:      Effort: Pulmonary effort is normal.      Breath sounds: Normal breath sounds.   Abdominal:      General: Bowel sounds are normal. There is no distension.      Palpations: Abdomen is soft. There is no mass.      Tenderness: There is no abdominal tenderness. There is no guarding or rebound.   Musculoskeletal: Normal range of motion.         General: No tenderness or deformity.   Skin:     General: Skin is warm and dry.      Coloration: Skin is not pale.      Findings: No erythema or rash.   Neurological:      Mental Status: He is alert and oriented to " person, place, and time.   Psychiatric:         Behavior: Behavior normal.         Thought Content: Thought content normal.         Judgment: Judgment normal.           Procedure   Procedures       Assessment/Plan      Diagnosis Plan   1. Dilated cardiomyopathy (CMS/HCC)  CBC & Differential    Comprehensive Metabolic Panel    Lipid Panel    TSH   2. Paroxysmal atrial fibrillation (CMS/HCC)  CBC & Differential    Comprehensive Metabolic Panel    Lipid Panel    TSH   3. Shortness of breath  CBC & Differential    Comprehensive Metabolic Panel    Lipid Panel    TSH   4. Essential hypertension  CBC & Differential    Comprehensive Metabolic Panel    Lipid Panel    TSH   5. Mixed hyperlipidemia  CBC & Differential    Comprehensive Metabolic Panel    Lipid Panel    TSH     1.  At this time, the patient appears well compensated with regards to dilated cardiomyopathy.  He unfortunately is not on ACE inhibitor therapy or an alternate, comparable agent.  Ramipril was discontinued in favor of Entresto.  He discontinued Entresto as he could not afford that.  I would not rechallenge the Entresto for now as cost is an issue.  We will restart him on ramipril.    2.  He would continue his cardioprotective medical regimen otherwise.  We did give him refills of requested medications.  That has been sent to his pharmacy.    3.  We will also schedule for routine laboratories.  He will have that performed at his convenience.    4.  The patient will be scheduled for routine interrogations of his ICD through the clinic.  He is scheduled for a routine interrogation in 1 week through the clinic.  We can review battery status, device function, etc.    5.  DARBY saul currently appears well treated with sotalol and anticoagulation therapy.  I would continue that regimen without change.    6.  We will continue to see him every 6 months to the clinic.  We did review testing with him today.  Recent stress test indicates no ischemia.  He has baseline  dilated cardiomyopathy both by post stress systolic function by stress test and echocardiogram findings.  EF by various modalities is suggested at 35 to 40%.  His most recent carotid duplex suggest nonobstructive disease in bilateral carotid arteries with antegrade flow bilateral vertebral arteries noted.  We have reviewed that in detail with him today.  I do not feel anything further is warranted for now.  We will continue to treat him medically and follow him closely through the clinic.           Bill Umana  reports that he has never smoked. He has never used smokeless tobacco..        Patient's Body mass index is 29.5 kg/m². BMI is above normal parameters. Recommendations include: educational material and referral to primary care.             Electronically signed by:

## 2020-09-25 ENCOUNTER — OFFICE VISIT (OUTPATIENT)
Dept: CARDIOLOGY | Facility: CLINIC | Age: 55
End: 2020-09-25

## 2020-09-25 DIAGNOSIS — I47.29 NONSUSTAINED VENTRICULAR TACHYCARDIA (HCC): ICD-10-CM

## 2020-09-25 DIAGNOSIS — I48.0 PAROXYSMAL ATRIAL FIBRILLATION (HCC): ICD-10-CM

## 2020-09-25 DIAGNOSIS — I42.0 NONISCHEMIC DILATED CARDIOMYOPATHY (HCC): ICD-10-CM

## 2020-09-25 DIAGNOSIS — I42.0 DILATED CARDIOMYOPATHY (HCC): ICD-10-CM

## 2020-09-25 DIAGNOSIS — Z95.810 AICD (AUTOMATIC CARDIOVERTER/DEFIBRILLATOR) PRESENT: ICD-10-CM

## 2020-09-25 PROCEDURE — 93289 INTERROG DEVICE EVAL HEART: CPT | Performed by: INTERNAL MEDICINE

## 2020-10-17 ENCOUNTER — RESULTS ENCOUNTER (OUTPATIENT)
Dept: CARDIOLOGY | Facility: CLINIC | Age: 55
End: 2020-10-17

## 2020-10-17 DIAGNOSIS — E78.2 MIXED HYPERLIPIDEMIA: ICD-10-CM

## 2020-10-17 DIAGNOSIS — I48.0 PAROXYSMAL ATRIAL FIBRILLATION (HCC): ICD-10-CM

## 2020-10-17 DIAGNOSIS — I42.0 DILATED CARDIOMYOPATHY (HCC): ICD-10-CM

## 2020-10-17 DIAGNOSIS — I10 ESSENTIAL HYPERTENSION: ICD-10-CM

## 2020-10-17 DIAGNOSIS — R06.02 SHORTNESS OF BREATH: ICD-10-CM

## 2020-12-15 RX ORDER — WARFARIN SODIUM 5 MG/1
TABLET ORAL
Qty: 90 TABLET | Refills: 1 | OUTPATIENT
Start: 2020-12-15

## 2021-01-18 ENCOUNTER — TELEPHONE (OUTPATIENT)
Dept: CARDIOLOGY | Facility: CLINIC | Age: 56
End: 2021-01-18

## 2021-01-22 ENCOUNTER — OFFICE VISIT (OUTPATIENT)
Dept: CARDIOLOGY | Facility: CLINIC | Age: 56
End: 2021-01-22

## 2021-01-22 VITALS
OXYGEN SATURATION: 98 % | BODY MASS INDEX: 28.8 KG/M2 | SYSTOLIC BLOOD PRESSURE: 104 MMHG | WEIGHT: 231.6 LBS | HEIGHT: 75 IN | DIASTOLIC BLOOD PRESSURE: 70 MMHG | HEART RATE: 58 BPM

## 2021-01-22 DIAGNOSIS — R00.2 PALPITATIONS: ICD-10-CM

## 2021-01-22 DIAGNOSIS — R06.02 SHORTNESS OF BREATH: ICD-10-CM

## 2021-01-22 DIAGNOSIS — I48.0 PAROXYSMAL ATRIAL FIBRILLATION (HCC): Primary | ICD-10-CM

## 2021-01-22 DIAGNOSIS — Z01.818 PRE-OPERATIVE CLEARANCE: ICD-10-CM

## 2021-01-22 DIAGNOSIS — I42.0 DILATED CARDIOMYOPATHY (HCC): ICD-10-CM

## 2021-01-22 DIAGNOSIS — Z95.810 AICD (AUTOMATIC CARDIOVERTER/DEFIBRILLATOR) PRESENT: ICD-10-CM

## 2021-01-22 DIAGNOSIS — R07.2 PRECORDIAL PAIN: ICD-10-CM

## 2021-01-22 PROCEDURE — 99213 OFFICE O/P EST LOW 20 MIN: CPT | Performed by: PHYSICIAN ASSISTANT

## 2021-01-22 PROCEDURE — 93289 INTERROG DEVICE EVAL HEART: CPT | Performed by: INTERNAL MEDICINE

## 2021-01-22 RX ORDER — RAMIPRIL 2.5 MG/1
2.5 CAPSULE ORAL DAILY
Qty: 90 CAPSULE | Refills: 3 | Status: CANCELLED | OUTPATIENT
Start: 2021-01-22

## 2021-01-22 RX ORDER — RAMIPRIL 2.5 MG/1
2.5 CAPSULE ORAL DAILY
Qty: 90 CAPSULE | Refills: 3 | Status: SHIPPED | OUTPATIENT
Start: 2021-01-22 | End: 2021-07-01 | Stop reason: SDUPTHER

## 2021-01-22 NOTE — PROGRESS NOTES
Problem list     Subjective   Bill Umana is a 55 y.o. male     Chief Complaint   Patient presents with   • Cardiomyopathy     presents for hospital f/u and clearance   • Chest Pain   • Palpitations   • Shortness of Breath   • Medical Clearance   Problem list  1.  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  Chronic systolic heart failure  2.1 EF estimated at 35-40% by echocardiogram February 2018  2.2 EF estimated in the low 40s post stress December 2018  2.3 EF by ventriculography during catheterization March 2018 at 40-45%  2.4 EF by echocardiogram November 2019 at 35 to 40% with grade 1 diastolic dysfunction  3.  Atrial fibrillation on Coumadin therapy  4.  Dyslipidemia  5.  GERD  6.  Mild mitral and tricuspid regurgitation  7.  Cardiac catheterization March 2018 with normal coronaries and an EF estimated at 40-45%  8.  Nonobstructive carotid artery stenosis with 16 to 49% bilateral internal carotid artery stenosis by duplex November 2019    HPI  The patient presents into the clinic today for routine follow-up and for ER evaluation follow-up as well.  He presented to the ER a few days ago with chest discomfort.  He was followed closely by protocol, monitored by serial EKGs and cardiac enzymes, and apparently remained unremarkable.  He eventually was discharged and advised to follow-up with us today.  The patient reports that he still has chest pain.  He describes a left lower chest discomfort, more of a sharp stabbing sensation.  This tends to last just a few seconds and then resolves fairly quickly.  This can recur several times throughout the day, typically on average every 5 to 10 minutes.  He reports no  hemoptysis.  He has no fever or chills.  He has stable dyspnea otherwise.  He does need cardiac clearance prior to a myelogram.  He is concerned because of recurrent chest discomfort and would like further testing prior to any procedures performed.  Current Outpatient Medications on File Prior to Visit   Medication Sig Dispense Refill   • aspirin 81 MG tablet Take 1 tablet by mouth Daily. 30 tablet 11   • carvedilol (COREG) 6.25 MG tablet Take 1 tablet by mouth 2 (Two) Times a Day. 180 tablet 3   • dicyclomine (BENTYL) 10 MG capsule Take 1 capsule by mouth Daily. (Patient taking differently: Take 20 mg by mouth Daily.) 30 capsule 11   • fludrocortisone 0.1 MG tablet Take 1 tablet by mouth Daily. 90 tablet 3   • FLUOXETINE HCL PO Take 60 mg by mouth Daily.     • Fluticasone Furoate-Vilanterol (BREO ELLIPTA IN) Inhale 2 puffs Daily As Needed.     • furosemide (LASIX) 20 MG tablet Take 1 tablet by mouth Daily. (Patient taking differently: Take 40 mg by mouth Daily.) 90 tablet 3   • gabapentin (NEURONTIN) 300 MG capsule 2 (Two) Times a Day.  2   • HYDROcodone-acetaminophen (NORCO) 5-325 MG per tablet Take 1 tablet by mouth As Needed.  0   • ibuprofen (ADVIL,MOTRIN) 800 MG tablet prn     • linaclotide (LINZESS) 72 MCG capsule capsule Take 72 mcg by mouth Every Morning Before Breakfast. Takes 2 capsules daily     • methocarbamol (ROBAXIN) 750 MG tablet 3 (Three) Times a Day.  1   • nitroglycerin (NITROSTAT) 0.4 MG SL tablet 1 under the tongue as needed for angina, may repeat q5mins for up three doses 100 tablet 11   • pantoprazole (PROTONIX) 40 MG EC tablet TAKE 1 TABLET EVERY DAY 90 tablet 3   • potassium chloride (K-DUR) 10 MEQ CR tablet Take 1 tablet by mouth Daily. 90 tablet 3   • rizatriptan (MAXALT) 10 MG tablet Take 1 tablet by mouth Daily As Needed.     • simvastatin (ZOCOR) 20 MG tablet Take 1 tablet by mouth Every Night. 90 tablet 3   • sotalol (BETAPACE) 120 MG tablet Take 1 tablet by mouth 2 (Two) Times a Day.  180 tablet 3   • tamsulosin (FLOMAX) 0.4 MG capsule 24 hr capsule Take 1 capsule by mouth Daily.  1   • traMADol (ULTRAM) 50 MG tablet Take 1 tablet by mouth 2 (Two) Times a Day As Needed.     • traZODone (DESYREL) 100 MG tablet Take 200 mg by mouth Every Night.     • warfarin (COUMADIN) 5 MG tablet Followed by PCP. Currently 5mg qd 30 tablet 5   • [DISCONTINUED] ramipril (Altace) 2.5 MG capsule Take 1 capsule by mouth Daily. 90 capsule 3   • [DISCONTINUED] busPIRone (BUSPAR) 10 MG tablet Take 10 mg by mouth 2 (Two) Times a Day.       No current facility-administered medications on file prior to visit.        Patient has no known allergies.    Past Medical History:   Diagnosis Date   • Atrial fibrillation (CMS/HCC)    • Diverticulitis    • Hyperlipidemia    • Hypertension    • ICD (implantable cardioverter-defibrillator) in place        Social History     Socioeconomic History   • Marital status:      Spouse name: Not on file   • Number of children: Not on file   • Years of education: Not on file   • Highest education level: Not on file   Tobacco Use   • Smoking status: Never Smoker   • Smokeless tobacco: Never Used   Substance and Sexual Activity   • Alcohol use: No   • Drug use: No   • Sexual activity: Defer       Family History   Problem Relation Age of Onset   • No Known Problems Mother    • No Known Problems Father        Review of Systems   Constitutional: Positive for fatigue. Negative for chills, diaphoresis and fever.   HENT: Negative.    Eyes: Positive for visual disturbance.   Respiratory: Positive for shortness of breath (with daily activity) and wheezing. Negative for apnea, cough and chest tightness.    Cardiovascular: Positive for chest pain ( seen at [facility] ER on Monday) and palpitations. Negative for leg swelling.   Gastrointestinal: Positive for nausea. Negative for blood in stool, constipation, diarrhea and vomiting.   Endocrine: Negative.    Genitourinary: Negative.  Negative for hematuria.    "  Musculoskeletal: Positive for back pain. Negative for arthralgias, myalgias and neck pain.   Skin: Negative.    Allergic/Immunologic: Positive for environmental allergies. Negative for food allergies.   Neurological: Positive for dizziness (comes and goes). Negative for syncope, weakness, light-headedness, numbness and headaches.   Hematological: Bruises/bleeds easily.   Psychiatric/Behavioral: Negative for agitation and sleep disturbance. The patient is nervous/anxious.        Objective   Vitals:    01/22/21 0835   BP: 104/70   BP Location: Left arm   Patient Position: Sitting   Pulse: 58   SpO2: 98%   Weight: 105 kg (231 lb 9.6 oz)   Height: 190.5 cm (75\")      /70 (BP Location: Left arm, Patient Position: Sitting)   Pulse 58   Ht 190.5 cm (75\")   Wt 105 kg (231 lb 9.6 oz)   SpO2 98%   BMI 28.95 kg/m²    Lab Results (most recent)     None        Physical Exam  Vitals signs and nursing note reviewed.   Constitutional:       General: He is not in acute distress.     Appearance: He is well-developed.   HENT:      Head: Normocephalic and atraumatic.   Eyes:      Conjunctiva/sclera: Conjunctivae normal.      Pupils: Pupils are equal, round, and reactive to light.   Neck:      Musculoskeletal: Normal range of motion and neck supple.      Vascular: No JVD.      Trachea: No tracheal deviation.   Cardiovascular:      Rate and Rhythm: Normal rate and regular rhythm.      Heart sounds: Normal heart sounds.   Pulmonary:      Effort: Pulmonary effort is normal.      Breath sounds: Normal breath sounds.   Abdominal:      General: Bowel sounds are normal. There is no distension.      Palpations: Abdomen is soft. There is no mass.      Tenderness: There is no abdominal tenderness. There is no guarding or rebound.   Musculoskeletal: Normal range of motion.         General: No tenderness or deformity.   Skin:     General: Skin is warm and dry.      Coloration: Skin is not pale.      Findings: No erythema or rash. "   Neurological:      Mental Status: He is alert and oriented to person, place, and time.   Psychiatric:         Behavior: Behavior normal.         Thought Content: Thought content normal.         Judgment: Judgment normal.           Procedure   Procedures       Assessment/Plan      Diagnosis Plan   1. Paroxysmal atrial fibrillation (CMS/HCC)  Adult Stress Echo W/ Cont or Stress Agent if Necessary Per Protocol   2. Shortness of breath  Adult Stress Echo W/ Cont or Stress Agent if Necessary Per Protocol   3. Precordial pain  Adult Stress Echo W/ Cont or Stress Agent if Necessary Per Protocol   4. Palpitations  Adult Stress Echo W/ Cont or Stress Agent if Necessary Per Protocol   5. Pre-operative clearance  Adult Stress Echo W/ Cont or Stress Agent if Necessary Per Protocol     1.  The patient has ongoing chest discomfort.  He describes atypical characteristics of his chest discomfort.  I would not feel that he needs further evaluation via catheterization.  His nuclear stress test from July of last year indicated no ischemia.  In that setting, we would make consideration for an additional but an alternative form of ischemia assessment via stress echo.  We will discuss protocol with the techs performing the study, caution given heart rate with ICD, etc.    2.  For now, I would continue medical regimen without change.    3.  Most recent ICD interrogation is indicated stable function.  We will continue that every 3 months through the clinic.    4.  We will see him back to review study results.  He will call for any issues prior to follow-up.           Bill Umana  reports that he has never smoked. He has never used smokeless tobacco..        Patient's Body mass index is 28.95 kg/m². BMI is above normal parameters. Recommendations include: educational material.             Electronically signed by:

## 2021-01-22 NOTE — PATIENT INSTRUCTIONS

## 2021-01-27 ENCOUNTER — HOSPITAL ENCOUNTER (OUTPATIENT)
Dept: CARDIOLOGY | Facility: HOSPITAL | Age: 56
Discharge: HOME | End: 2021-01-27

## 2021-01-27 DIAGNOSIS — R06.02 SHORTNESS OF BREATH: ICD-10-CM

## 2021-01-27 DIAGNOSIS — Z01.818 PRE-OPERATIVE CLEARANCE: ICD-10-CM

## 2021-01-27 DIAGNOSIS — R00.2 PALPITATIONS: ICD-10-CM

## 2021-01-27 DIAGNOSIS — R07.2 PRECORDIAL PAIN: ICD-10-CM

## 2021-01-27 DIAGNOSIS — I48.0 PAROXYSMAL ATRIAL FIBRILLATION (HCC): ICD-10-CM

## 2021-01-27 PROCEDURE — 93350 STRESS TTE ONLY: CPT | Performed by: INTERNAL MEDICINE

## 2021-01-27 PROCEDURE — 93325 DOPPLER ECHO COLOR FLOW MAPG: CPT

## 2021-01-27 PROCEDURE — 93351 STRESS TTE COMPLETE: CPT

## 2021-01-27 PROCEDURE — 93320 DOPPLER ECHO COMPLETE: CPT

## 2021-01-27 PROCEDURE — 93018 CV STRESS TEST I&R ONLY: CPT | Performed by: INTERNAL MEDICINE

## 2021-01-28 ENCOUNTER — TELEPHONE (OUTPATIENT)
Dept: CARDIOLOGY | Facility: CLINIC | Age: 56
End: 2021-01-28

## 2021-01-28 NOTE — TELEPHONE ENCOUNTER
"Pt LVM stating he has problems w/ his body temp.  States his temperature falls down to 89.9 every day between 1:30-3 PM and returns to normal.  Reports today his body temp \"was low for quiet some time.  Maybe 30 mins or longer.\"  States he does not feel well when this occurs.  Feels cold inside his body and sweaty on the outside.  Would like to speak w/ someone.  Tried calling pt back.  No ans.  LVM to ret call.   "

## 2021-01-29 NOTE — TELEPHONE ENCOUNTER
PATIENT CALLING BACK TODAY REGARDING TEMP. INSTRUCTED HIM TO CALL HIS PCP FOR EVAL. VERBALIZED OK. PH,JOSAFATN

## 2021-02-01 LAB
BH CV ECHO MEAS - BSA(HAYCOCK): 2.4 M^2
BH CV ECHO MEAS - BSA: 2.3 M^2
BH CV ECHO MEAS - BZI_BMI: 28.9 KILOGRAMS/M^2
BH CV ECHO MEAS - BZI_METRIC_HEIGHT: 190.5 CM
BH CV ECHO MEAS - BZI_METRIC_WEIGHT: 104.8 KG
BH CV STRESS RECOVERY BP: NORMAL MMHG
BH CV STRESS RECOVERY HR: 67 BPM
PERCENT MAX PREDICTED HR: 71.95 %
STRESS BASELINE BP: NORMAL MMHG
STRESS BASELINE HR: 69 BPM
STRESS PERCENT HR: 85 %
STRESS POST ESTIMATED WORKLOAD: 12 METS
STRESS POST EXERCISE DUR MIN: 11 MIN
STRESS POST EXERCISE DUR SEC: 10 SEC
STRESS POST PEAK BP: NORMAL MMHG
STRESS POST PEAK HR: 118 BPM

## 2021-02-02 RX ORDER — WARFARIN SODIUM 5 MG/1
TABLET ORAL
Qty: 90 TABLET | Refills: 1 | OUTPATIENT
Start: 2021-02-02

## 2021-03-15 RX ORDER — FUROSEMIDE 40 MG/1
40 TABLET ORAL DAILY
COMMUNITY
End: 2021-03-15 | Stop reason: SDUPTHER

## 2021-03-15 RX ORDER — FUROSEMIDE 40 MG/1
40 TABLET ORAL DAILY
Qty: 90 TABLET | Refills: 3 | Status: SHIPPED | OUTPATIENT
Start: 2021-03-15 | End: 2021-05-28 | Stop reason: SDUPTHER

## 2021-03-17 ENCOUNTER — OFFICE VISIT (OUTPATIENT)
Dept: CARDIOLOGY | Facility: CLINIC | Age: 56
End: 2021-03-17

## 2021-03-17 VITALS
BODY MASS INDEX: 28.25 KG/M2 | TEMPERATURE: 96.2 F | DIASTOLIC BLOOD PRESSURE: 76 MMHG | HEART RATE: 64 BPM | SYSTOLIC BLOOD PRESSURE: 116 MMHG | WEIGHT: 227.2 LBS | OXYGEN SATURATION: 98 % | HEIGHT: 75 IN

## 2021-03-17 DIAGNOSIS — I48.0 PAROXYSMAL ATRIAL FIBRILLATION (HCC): Primary | ICD-10-CM

## 2021-03-17 DIAGNOSIS — R06.02 SHORTNESS OF BREATH: ICD-10-CM

## 2021-03-17 DIAGNOSIS — I42.0 DILATED CARDIOMYOPATHY (HCC): ICD-10-CM

## 2021-03-17 DIAGNOSIS — R53.82 CHRONIC FATIGUE: ICD-10-CM

## 2021-03-17 PROCEDURE — 99213 OFFICE O/P EST LOW 20 MIN: CPT | Performed by: PHYSICIAN ASSISTANT

## 2021-03-17 PROCEDURE — 93000 ELECTROCARDIOGRAM COMPLETE: CPT | Performed by: PHYSICIAN ASSISTANT

## 2021-03-17 NOTE — PATIENT INSTRUCTIONS

## 2021-03-17 NOTE — PROGRESS NOTES
Problem list     Subjective   Bill Umana is a 55 y.o. male     Chief Complaint   Patient presents with   • Atrial Fibrillation     presents for 6 month f/u   • Chest Pain   • Palpitations   • Shortness of Breath   Problem list  1.  History of nonischemic dilated cardiomyopathy status post AICD implant ([employer])   1.1 recent interrogation demonstrates 4 episodes of nonsustained ventricular tachycardia and one episode of ventricular tachycardia converted with ATP without defibrillation  1.2 patient on Sotalol for ventricular tachycardia followed by EP services  2.  Chronic systolic heart failure  2.1 EF estimated at 35-40% by echocardiogram February 2018  2.2 EF estimated in the low 40s post stress December 2018  2.3 EF by ventriculography during catheterization March 2018 at 40-45%  2.4 EF by echocardiogram November 2019 at 35 to 40% with grade 1 diastolic dysfunction  3.  Atrial fibrillation on Coumadin therapy  4.  Dyslipidemia  5.  GERD  6.  Mild mitral and tricuspid regurgitation  7.  Cardiac catheterization March 2018 with normal coronaries and an EF estimated at 40-45%  8.  Nonobstructive carotid artery stenosis with 16 to 49% bilateral internal carotid artery stenosis by duplex November 2019    HPI  This pleasant gentleman presents back to the clinic today for routine evaluation and follow-up.  Since last appointment here, the patient is continued to do fairly well from general cardiovascular standpoint.  He was seen for surgical clearance.  He complained of chest discomfort.  A follow-up stress echocardiogram was performed and indicated no echocardiographic evidence of ischemia.  Previous echo supported stable systolic function at 35 to 40% and stable valvular parameters otherwise.  Most recent ICD interrogation indicates normal function as well.  That will be continued to be monitored every 3 months to the clinic.  He still has mild chest discomfort.  This, if anything, has improved.  His dyspnea  is at baseline.  He has no failure or dysrhythmic symptoms.  He has no further complaints.    Current Outpatient Medications on File Prior to Visit   Medication Sig Dispense Refill   • aspirin 81 MG tablet Take 1 tablet by mouth Daily. 30 tablet 11   • carvedilol (COREG) 6.25 MG tablet Take 1 tablet by mouth 2 (Two) Times a Day. 180 tablet 3   • dicyclomine (BENTYL) 10 MG capsule Take 1 capsule by mouth Daily. (Patient taking differently: Take 20 mg by mouth Daily.) 30 capsule 11   • fludrocortisone 0.1 MG tablet Take 1 tablet by mouth Daily. 90 tablet 3   • FLUOXETINE HCL PO Take 60 mg by mouth Daily.     • Fluticasone Furoate-Vilanterol (BREO ELLIPTA IN) Inhale 2 puffs Daily As Needed.     • furosemide (LASIX) 40 MG tablet Take 1 tablet by mouth Daily. 90 tablet 3   • gabapentin (NEURONTIN) 300 MG capsule 2 (Two) Times a Day.  2   • HYDROcodone-acetaminophen (NORCO) 5-325 MG per tablet Take 1 tablet by mouth As Needed.  0   • ibuprofen (ADVIL,MOTRIN) 800 MG tablet prn     • linaclotide (LINZESS) 72 MCG capsule capsule Take 72 mcg by mouth Every Morning Before Breakfast. Takes 2 capsules daily     • methocarbamol (ROBAXIN) 750 MG tablet 3 (Three) Times a Day.  1   • nitroglycerin (NITROSTAT) 0.4 MG SL tablet 1 under the tongue as needed for angina, may repeat q5mins for up three doses 100 tablet 11   • pantoprazole (PROTONIX) 40 MG EC tablet TAKE 1 TABLET EVERY DAY 90 tablet 3   • potassium chloride (K-DUR) 10 MEQ CR tablet Take 1 tablet by mouth Daily. 90 tablet 3   • ramipril (Altace) 2.5 MG capsule Take 1 capsule by mouth Daily. 90 capsule 3   • rizatriptan (MAXALT) 10 MG tablet Take 1 tablet by mouth Daily As Needed.     • simvastatin (ZOCOR) 20 MG tablet Take 1 tablet by mouth Every Night. 90 tablet 3   • sotalol (BETAPACE) 120 MG tablet Take 1 tablet by mouth 2 (Two) Times a Day. 180 tablet 3   • tamsulosin (FLOMAX) 0.4 MG capsule 24 hr capsule Take 1 capsule by mouth Daily.  1   • traMADol (ULTRAM) 50 MG  tablet Take 1 tablet by mouth 2 (Two) Times a Day As Needed.     • traZODone (DESYREL) 100 MG tablet Take 200 mg by mouth Every Night.     • warfarin (COUMADIN) 5 MG tablet Followed by PCP. Currently 5mg qd 30 tablet 5     No current facility-administered medications on file prior to visit.       Patient has no known allergies.    Past Medical History:   Diagnosis Date   • Atrial fibrillation (CMS/HCC)    • Diverticulitis    • Hyperlipidemia    • Hypertension    • ICD (implantable cardioverter-defibrillator) in place        Social History     Socioeconomic History   • Marital status:      Spouse name: Not on file   • Number of children: Not on file   • Years of education: Not on file   • Highest education level: Not on file   Tobacco Use   • Smoking status: Never Smoker   • Smokeless tobacco: Never Used   Substance and Sexual Activity   • Alcohol use: No   • Drug use: No   • Sexual activity: Defer       Family History   Problem Relation Age of Onset   • No Known Problems Mother    • No Known Problems Father        Review of Systems   Constitutional: Positive for fatigue. Negative for chills, diaphoresis and fever.   Eyes: Positive for visual disturbance.   Respiratory: Positive for shortness of breath. Negative for apnea, cough, chest tightness and wheezing.    Cardiovascular: Positive for chest pain (occas, no more than usual) and palpitations (occas skip). Negative for leg swelling.   Gastrointestinal: Positive for diarrhea and nausea. Negative for abdominal pain, blood in stool, constipation and vomiting.   Endocrine: Negative.    Genitourinary: Negative.  Negative for hematuria.   Musculoskeletal: Positive for arthralgias, back pain, myalgias and neck pain.        Blood clot L leg after receiving Covid vaccine   Skin: Negative.    Allergic/Immunologic: Negative.  Negative for environmental allergies and food allergies.   Neurological: Positive for numbness (extremities for back issues) and headaches.  "Negative for dizziness, syncope, weakness and light-headedness.   Hematological: Bruises/bleeds easily.   Psychiatric/Behavioral: Positive for sleep disturbance (has woke up smothering). Negative for agitation. The patient is not nervous/anxious.        Objective   Vitals:    03/17/21 0900   BP: 116/76   BP Location: Left arm   Patient Position: Sitting   Pulse: 64   Temp: 96.2 °F (35.7 °C)   SpO2: 98%   Weight: 103 kg (227 lb 3.2 oz)   Height: 190.5 cm (75\")      /76 (BP Location: Left arm, Patient Position: Sitting)   Pulse 64   Temp 96.2 °F (35.7 °C)   Ht 190.5 cm (75\")   Wt 103 kg (227 lb 3.2 oz)   SpO2 98%   BMI 28.40 kg/m²    Lab Results (most recent)     None        Physical Exam  Vitals and nursing note reviewed.   Constitutional:       General: He is not in acute distress.     Appearance: He is well-developed.   HENT:      Head: Normocephalic and atraumatic.   Eyes:      Conjunctiva/sclera: Conjunctivae normal.      Pupils: Pupils are equal, round, and reactive to light.   Neck:      Vascular: No JVD.      Trachea: No tracheal deviation.   Cardiovascular:      Rate and Rhythm: Normal rate and regular rhythm.      Heart sounds: Normal heart sounds.   Pulmonary:      Effort: Pulmonary effort is normal.      Breath sounds: Normal breath sounds.   Abdominal:      General: Bowel sounds are normal. There is no distension.      Palpations: Abdomen is soft. There is no mass.      Tenderness: There is no abdominal tenderness. There is no guarding or rebound.   Musculoskeletal:         General: No tenderness or deformity. Normal range of motion.      Cervical back: Normal range of motion and neck supple.   Skin:     General: Skin is warm and dry.      Coloration: Skin is not pale.      Findings: No erythema or rash.   Neurological:      Mental Status: He is alert and oriented to person, place, and time.   Psychiatric:         Behavior: Behavior normal.         Thought Content: Thought content normal.      "      Judgment: Judgment normal.           Procedure     ECG 12 Lead    Date/Time: 3/17/2021 9:06 AM  Performed by: Solo Jackson PA  Authorized by: Solo Jackson PA   Comparison: compared with previous ECG from 6/9/2020  Comparison to previous ECG: Sinus rhythm, rate 58, normal axis, no acute changes noted.                 Assessment/Plan      Diagnosis Plan   1. Paroxysmal atrial fibrillation (CMS/HCC)  ECG 12 Lead   2. Shortness of breath  ECG 12 Lead   3. Chronic fatigue  ECG 12 Lead   4. Dilated cardiomyopathy (CMS/HCC)       1.  At this time, the patient appears to be doing fairly well from general cardiovascular standpoint.  He reports no symptoms of failure or dysrhythmias at this time.  He describes chest pain which is atypical.  Recent stress echocardiogram was very much unremarkable with no evidence of ischemia.  Previous echo indicated stable parameters.  I do not feel further evaluation is warranted.    2.  DARBY saul remains well treated with sotalol therapy.  He is on anticoagulation for CVA risk reduction.  I would make no adjustment in that regimen.    3.  The patient is mostly concerned at this time with his degree of fatigue.  He has recurrent episodes of bradycardia, directly correlating to symptoms at home.  We have discussed consideration for augmentation of pacing function of his device.  I would like to schedule him for a follow-up interrogation and we will review options at that time.    4.  Otherwise, I would make no adjustments.  We will continue to see him on 6-month intervals.  I feel that he is doing reasonably well at this time.  He will call for complications.           Bill Umana  reports that he has never smoked. He has never used smokeless tobacco..         Patient's Body mass index is 28.4 kg/m². BMI is above normal parameters. Recommendations include: educational material.             Electronically signed by:

## 2021-03-26 ENCOUNTER — OFFICE VISIT (OUTPATIENT)
Dept: CARDIOLOGY | Facility: CLINIC | Age: 56
End: 2021-03-26

## 2021-03-26 DIAGNOSIS — Z95.810 AICD (AUTOMATIC CARDIOVERTER/DEFIBRILLATOR) PRESENT: ICD-10-CM

## 2021-03-26 DIAGNOSIS — I42.0 DILATED CARDIOMYOPATHY (HCC): ICD-10-CM

## 2021-03-26 PROCEDURE — 93283 PRGRMG EVAL IMPLANTABLE DFB: CPT | Performed by: INTERNAL MEDICINE

## 2021-03-31 ENCOUNTER — TELEPHONE (OUTPATIENT)
Dept: CARDIOLOGY | Facility: CLINIC | Age: 56
End: 2021-03-31

## 2021-05-03 ENCOUNTER — TELEPHONE (OUTPATIENT)
Dept: CARDIOLOGY | Facility: CLINIC | Age: 56
End: 2021-05-03

## 2021-05-03 NOTE — TELEPHONE ENCOUNTER
Received Cardiac Clearance request from [facility] FOR BILATERAL INGUINAL HERNIA REPAIR ON 05/26/2021  Scanned into clearance folder and put in box for GISSEL MANE.    Abena Pat, Ravinder Rep

## 2021-05-06 NOTE — TELEPHONE ENCOUNTER
Patient cleared. Acceptable risk. May hold Coumadin 5 days. Continue Aspirin.     Faxed to [phone]

## 2021-05-27 DIAGNOSIS — K21.9 GASTROESOPHAGEAL REFLUX DISEASE, UNSPECIFIED WHETHER ESOPHAGITIS PRESENT: ICD-10-CM

## 2021-05-27 DIAGNOSIS — E78.2 MIXED HYPERLIPIDEMIA: Primary | ICD-10-CM

## 2021-05-27 RX ORDER — SIMVASTATIN 20 MG
TABLET ORAL
Qty: 90 TABLET | Refills: 1 | Status: SHIPPED | OUTPATIENT
Start: 2021-05-27 | End: 2022-03-17

## 2021-05-27 RX ORDER — PANTOPRAZOLE SODIUM 40 MG/1
TABLET, DELAYED RELEASE ORAL
Qty: 90 TABLET | Refills: 1 | Status: SHIPPED | OUTPATIENT
Start: 2021-05-27 | End: 2021-10-19

## 2021-05-28 DIAGNOSIS — I42.9 CARDIOMYOPATHY, UNSPECIFIED TYPE (HCC): ICD-10-CM

## 2021-05-28 DIAGNOSIS — R60.9 EDEMA, UNSPECIFIED TYPE: ICD-10-CM

## 2021-05-28 DIAGNOSIS — R06.02 SHORTNESS OF BREATH: ICD-10-CM

## 2021-05-28 RX ORDER — FUROSEMIDE 40 MG/1
40 TABLET ORAL DAILY
Qty: 90 TABLET | Refills: 3 | Status: SHIPPED | OUTPATIENT
Start: 2021-05-28 | End: 2022-10-17

## 2021-05-28 RX ORDER — POTASSIUM CHLORIDE 750 MG/1
10 TABLET, FILM COATED, EXTENDED RELEASE ORAL DAILY
Qty: 90 TABLET | Refills: 3 | Status: SHIPPED | OUTPATIENT
Start: 2021-05-28 | End: 2022-10-17

## 2021-05-28 RX ORDER — CARVEDILOL 6.25 MG/1
6.25 TABLET ORAL 2 TIMES DAILY
Qty: 180 TABLET | Refills: 3 | Status: SHIPPED | OUTPATIENT
Start: 2021-05-28 | End: 2022-10-18 | Stop reason: SDUPTHER

## 2021-06-08 DIAGNOSIS — R06.02 SHORTNESS OF BREATH: ICD-10-CM

## 2021-06-08 DIAGNOSIS — R60.9 EDEMA, UNSPECIFIED TYPE: ICD-10-CM

## 2021-06-08 DIAGNOSIS — I42.0 DILATED CARDIOMYOPATHY (HCC): ICD-10-CM

## 2021-06-08 RX ORDER — FUROSEMIDE 20 MG/1
TABLET ORAL
Qty: 90 TABLET | Refills: 3 | OUTPATIENT
Start: 2021-06-08

## 2021-06-21 ENCOUNTER — TELEPHONE (OUTPATIENT)
Dept: CARDIOLOGY | Facility: CLINIC | Age: 56
End: 2021-06-21

## 2021-06-21 NOTE — TELEPHONE ENCOUNTER
CARDIAC CLEARANCE RECEIVED THIS DATE, SCANNED, AND PLACED IN CLINICAL STAFF'S MAILBOX.      [facility] NEURO  DR. RAYSA MARISCAL  C4-5 ACDF

## 2021-06-28 NOTE — TELEPHONE ENCOUNTER
Per Solo:   May hold coumadin 4-5 days, if possible, continue taking ASA 81mg daily.     I confirmed w/ Cheryle that Lovenox is not mentioned unless specifically mentioned on the clearance request; which it is not.        Faxed to [phone]

## 2021-07-01 ENCOUNTER — OFFICE VISIT (OUTPATIENT)
Dept: CARDIOLOGY | Facility: CLINIC | Age: 56
End: 2021-07-01

## 2021-07-01 VITALS
HEART RATE: 90 BPM | BODY MASS INDEX: 27.88 KG/M2 | DIASTOLIC BLOOD PRESSURE: 83 MMHG | WEIGHT: 224.2 LBS | SYSTOLIC BLOOD PRESSURE: 132 MMHG | OXYGEN SATURATION: 98 % | HEIGHT: 75 IN

## 2021-07-01 DIAGNOSIS — I10 ESSENTIAL HYPERTENSION: ICD-10-CM

## 2021-07-01 DIAGNOSIS — R07.2 PRECORDIAL PAIN: ICD-10-CM

## 2021-07-01 DIAGNOSIS — I42.0 DILATED CARDIOMYOPATHY (HCC): Primary | ICD-10-CM

## 2021-07-01 DIAGNOSIS — R00.2 PALPITATIONS: ICD-10-CM

## 2021-07-01 PROCEDURE — 99213 OFFICE O/P EST LOW 20 MIN: CPT | Performed by: PHYSICIAN ASSISTANT

## 2021-07-01 PROCEDURE — 93000 ELECTROCARDIOGRAM COMPLETE: CPT | Performed by: PHYSICIAN ASSISTANT

## 2021-07-01 NOTE — PATIENT INSTRUCTIONS

## 2021-07-01 NOTE — PROGRESS NOTES
Problem list     Subjective   Bill Umana is a 56 y.o. male     Chief Complaint   Patient presents with   • Cardiomyopathy     presents as hospital f/u   • Atrial Fibrillation   • Chest Pain   • Shortness of Breath   Problem list  1.  History of nonischemic dilated cardiomyopathy status post AICD implant ([employer])   1.1 recent interrogation demonstrates 4 episodes of nonsustained ventricular tachycardia and one episode of ventricular tachycardia converted with ATP without defibrillation  1.2 patient on Sotalol for ventricular tachycardia followed by EP services  2.  Chronic systolic heart failure  2.1 EF estimated at 35-40% by echocardiogram February 2018  2.2 EF estimated in the low 40s post stress December 2018  2.3 EF by ventriculography during catheterization March 2018 at 40-45%  2.4 EF by echocardiogram November 2019 at 35 to 40% with grade 1 diastolic dysfunction  3.  Atrial fibrillation on Coumadin therapy  4.  Dyslipidemia  5.  GERD  6.  Mild mitral and tricuspid regurgitation  7.  Cardiac catheterization March 2018 with normal coronaries and an EF estimated at 40-45%  8.  Nonobstructive carotid artery stenosis with 16 to 49% bilateral internal carotid artery stenosis by duplex November 2019    HPI  The patient presents into the clinic today for hospital follow-up.  Recently, while at home, the patient experienced a fall.  He apparently was getting out of the bed to go to the bathroom.  Once reaching the bathroom, the patient had syncope.  He eventually was taken to [facility].  CT at that time supported possible cerebral hemorrhage.  He was flown to .  He was monitored closely during that hospitalization but eventually apparently not felt to have any significant cerebral bleed.  He had an echocardiogram which supported stable findings by patient report.  He apparently also had an interrogation of his device and reports that he had episodes of PVCs and increasing episodes of A. fib  not felt contributing to the patient's episode.  He was stabilized and discharged.  We are attempting to obtain all of his records.  The patient presents today not doing well.  He has significant, recurrent chest discomfort.  He feels that this could be related to palpitations.  He reports that his palpitations are occurring at time of exam and are consistent with PVCs by the EKG that I obtained.  He also feels that he is having increasing neurologic symptoms which he is worried with because of his previously reported cerebral hemorrhage.  The patient wants to go to the ER locally and have an admission as he is too concerned to do anything further on an outpatient basis at this time.    Current Outpatient Medications on File Prior to Visit   Medication Sig Dispense Refill   • aspirin 81 MG tablet Take 1 tablet by mouth Daily. 30 tablet 11   • carvedilol (COREG) 6.25 MG tablet Take 1 tablet by mouth 2 (Two) Times a Day. 180 tablet 3   • dicyclomine (BENTYL) 10 MG capsule Take 1 capsule by mouth Daily. (Patient taking differently: Take 20 mg by mouth Daily.) 30 capsule 11   • fludrocortisone 0.1 MG tablet Take 1 tablet by mouth Daily. 90 tablet 3   • FLUOXETINE HCL PO Take 60 mg by mouth Daily.     • Fluticasone Furoate-Vilanterol (BREO ELLIPTA IN) Inhale 2 puffs Daily As Needed.     • furosemide (LASIX) 40 MG tablet Take 1 tablet by mouth Daily. 90 tablet 3   • gabapentin (NEURONTIN) 300 MG capsule 2 (Two) Times a Day.  2   • HYDROcodone-acetaminophen (NORCO) 5-325 MG per tablet Take 1 tablet by mouth As Needed.  0   • ibuprofen (ADVIL,MOTRIN) 800 MG tablet prn     • linaclotide (LINZESS) 72 MCG capsule capsule Take 72 mcg by mouth Every Morning Before Breakfast. Takes 2 capsules daily     • methocarbamol (ROBAXIN) 750 MG tablet 3 (Three) Times a Day.  1   • nitroglycerin (NITROSTAT) 0.4 MG SL tablet 1 under the tongue as needed for angina, may repeat q5mins for up three doses 100 tablet 11   • potassium chloride 10  MEQ CR tablet Take 1 tablet by mouth Daily. 90 tablet 3   • rizatriptan (MAXALT) 10 MG tablet Take 1 tablet by mouth Daily As Needed.     • simvastatin (ZOCOR) 20 MG tablet TAKE 1 TABLET EVERY NIGHT 90 tablet 1   • sotalol (BETAPACE) 120 MG tablet Take 1 tablet by mouth 2 (Two) Times a Day. 180 tablet 3   • tamsulosin (FLOMAX) 0.4 MG capsule 24 hr capsule Take 1 capsule by mouth Daily.  1   • traMADol (ULTRAM) 50 MG tablet Take 1 tablet by mouth 2 (Two) Times a Day As Needed.     • traZODone (DESYREL) 100 MG tablet Take 200 mg by mouth Every Night.     • warfarin (COUMADIN) 5 MG tablet Followed by PCP. Currently 5mg qd 30 tablet 5   • pantoprazole (PROTONIX) 40 MG EC tablet TAKE 1 TABLET EVERY DAY 90 tablet 1     No current facility-administered medications on file prior to visit.       Patient has no known allergies.    Past Medical History:   Diagnosis Date   • Atrial fibrillation (CMS/HCC)    • Diverticulitis    • Hyperlipidemia    • Hypertension    • ICD (implantable cardioverter-defibrillator) in place        Social History     Socioeconomic History   • Marital status:      Spouse name: Not on file   • Number of children: Not on file   • Years of education: Not on file   • Highest education level: Not on file   Tobacco Use   • Smoking status: Never Smoker   • Smokeless tobacco: Never Used   Substance and Sexual Activity   • Alcohol use: No   • Drug use: No   • Sexual activity: Defer       Family History   Problem Relation Age of Onset   • No Known Problems Mother    • No Known Problems Father        Review of Systems   Constitutional: Positive for chills and fatigue. Negative for diaphoresis and fever.   Eyes: Positive for visual disturbance.   Respiratory: Positive for shortness of breath (with daily activity and times during the day). Negative for apnea, cough, chest tightness and wheezing.    Cardiovascular: Positive for chest pain (pain and pressure) and palpitations (afib, skipping and flutters  "constantly). Negative for leg swelling.   Gastrointestinal: Negative.  Negative for blood in stool.   Endocrine: Negative.    Genitourinary: Negative.  Negative for hematuria.   Musculoskeletal: Positive for arthralgias, back pain and neck pain. Negative for myalgias.   Skin: Negative.    Allergic/Immunologic: Negative.    Neurological: Positive for dizziness ( with position change and random episodes), syncope (last episoide Thursday morning while getting out of bed to go to bathroom), weakness (L side, no stroke), numbness (tingling on L side) and headaches (migraines). Negative for light-headedness.   Hematological: Bruises/bleeds easily.   Psychiatric/Behavioral: Positive for sleep disturbance (has woke up smothering).       Objective   Vitals:    07/01/21 0837   BP: 132/83   BP Location: Left arm   Patient Position: Sitting   Pulse: 90   SpO2: 98%   Weight: 102 kg (224 lb 3.2 oz)   Height: 190.5 cm (75\")      /83 (BP Location: Left arm, Patient Position: Sitting)   Pulse 90   Ht 190.5 cm (75\")   Wt 102 kg (224 lb 3.2 oz)   SpO2 98%   BMI 28.02 kg/m²    Lab Results (most recent)     None        Physical Exam  Vitals and nursing note reviewed.   Constitutional:       General: He is not in acute distress.     Appearance: He is well-developed.   HENT:      Head: Normocephalic and atraumatic.   Eyes:      Conjunctiva/sclera: Conjunctivae normal.      Pupils: Pupils are equal, round, and reactive to light.   Neck:      Vascular: No JVD.      Trachea: No tracheal deviation.   Cardiovascular:      Rate and Rhythm: Normal rate and regular rhythm.      Heart sounds: Normal heart sounds.   Pulmonary:      Effort: Pulmonary effort is normal.      Breath sounds: Normal breath sounds.   Abdominal:      General: Bowel sounds are normal. There is no distension.      Palpations: Abdomen is soft. There is no mass.      Tenderness: There is no abdominal tenderness. There is no guarding or rebound.   Musculoskeletal:    "      General: No tenderness or deformity. Normal range of motion.      Cervical back: Normal range of motion and neck supple.   Skin:     General: Skin is warm and dry.      Coloration: Skin is not pale.      Findings: No erythema or rash.   Neurological:      Mental Status: He is alert and oriented to person, place, and time.   Psychiatric:         Behavior: Behavior normal.         Thought Content: Thought content normal.         Judgment: Judgment normal.           Procedure     ECG 12 Lead    Date/Time: 7/1/2021 10:09 AM  Performed by: Solo Jackson PA  Authorized by: Solo Jackson PA   Comparison: compared with previous ECG from 6/24/2021  Comparison to previous ECG: Sinus rhythm, normal axis, PVC is noted which correlates the patient's symptoms of palpitations and chest pain, no acute changes noted.  No pacing noted.                 Assessment/Plan      Diagnosis Plan   1. Dilated cardiomyopathy (CMS/HCC)     2. Precordial pain     3. Palpitations     4. Essential hypertension       1.  The patient is very concerned about current symptoms.  He has significant chest pain, which he feels is related to his palpitations.  His palpitations are consistent with PVCs by today's exam.  He is having significant enough symptoms that he wants to go to the ER.  He will proceed on to the ER locally at his request.    2.  I have requested all of his previous records from , in which he was admitted for a cerebral hemorrhage after a fall by his report.  Apparently an echocardiogram and an interrogation was performed at that time.  Echo supported baseline findings to his knowledge.  He tells me that his interrogation of his device supported increasing A. fib and frequent PVCs.  He was told that this was not contributing to his whole clinical scenario.    3.  Irregardless, we will obtain those and discussed this with the patient once available.  He will proceed on to the ER.  I will make no further adjustments in  medications for now.  Further pending above.                     Electronically signed by:

## 2021-07-06 ENCOUNTER — OFFICE VISIT (OUTPATIENT)
Dept: CARDIOLOGY | Facility: CLINIC | Age: 56
End: 2021-07-06

## 2021-07-06 VITALS
DIASTOLIC BLOOD PRESSURE: 76 MMHG | HEIGHT: 75 IN | BODY MASS INDEX: 27.8 KG/M2 | HEART RATE: 70 BPM | OXYGEN SATURATION: 98 % | SYSTOLIC BLOOD PRESSURE: 116 MMHG | WEIGHT: 223.6 LBS

## 2021-07-06 DIAGNOSIS — R00.2 PALPITATIONS: ICD-10-CM

## 2021-07-06 DIAGNOSIS — I10 ESSENTIAL HYPERTENSION: ICD-10-CM

## 2021-07-06 DIAGNOSIS — I42.0 DILATED CARDIOMYOPATHY (HCC): Primary | ICD-10-CM

## 2021-07-06 DIAGNOSIS — R07.2 PRECORDIAL PAIN: ICD-10-CM

## 2021-07-06 PROCEDURE — 99213 OFFICE O/P EST LOW 20 MIN: CPT | Performed by: PHYSICIAN ASSISTANT

## 2021-07-06 NOTE — PROGRESS NOTES
Problem list     Subjective   Bill Umana is a 56 y.o. male     Chief Complaint   Patient presents with   • Follow-up     ER follow up - records in chart    Problem list  1.  History of nonischemic dilated cardiomyopathy status post AICD implant ([employer])   1.1 recent interrogation demonstrates 4 episodes of nonsustained ventricular tachycardia and one episode of ventricular tachycardia converted with ATP without defibrillation  1.2 patient on Sotalol for ventricular tachycardia followed by EP services  2.  Chronic systolic heart failure  2.1 EF estimated at 35-40% by echocardiogram February 2018  2.2 EF estimated in the low 40s post stress December 2018  2.3 EF by ventriculography during catheterization March 2018 at 40-45%  2.4 EF by echocardiogram November 2019 at 35 to 40% with grade 1 diastolic dysfunction  3.  Atrial fibrillation on Coumadin therapy  4.  Dyslipidemia  5.  GERD  6.  Mild mitral and tricuspid regurgitation  7.  Cardiac catheterization March 2018 with normal coronaries and an EF estimated at 40-45%  8.  Nonobstructive carotid artery stenosis with 16 to 49% bilateral internal carotid artery stenosis by duplex November 2019    HPI  The patient presents to the clinic today for hospital follow-up.  He was recently admitted following chest pain, palpitations, etc.  He was admitted to the hospital locally given symptoms.  He had a stress test which indicated no evidence of ischemia and he was discharged.  Apparently a CT of the head also indicated no evidence of cerebral bleed, which was a concern previously.  He presents today to discuss further evaluation.  The patient has started Coumadin back on his own, despite that being held through his neurosurgical team for at least a month.  He started that and reports he will continue to take that.  He understands the risk of doing so.  He has ongoing chest discomfort which can be significant.  He still has palpitations.  He feels to symptoms are  synonymous.  He has no dizziness or syncope at this time.  He has no further complaints.    Current Outpatient Medications on File Prior to Visit   Medication Sig Dispense Refill   • aspirin 81 MG tablet Take 1 tablet by mouth Daily. 30 tablet 11   • carvedilol (COREG) 6.25 MG tablet Take 1 tablet by mouth 2 (Two) Times a Day. 180 tablet 3   • dicyclomine (BENTYL) 10 MG capsule Take 1 capsule by mouth Daily. (Patient taking differently: Take 20 mg by mouth Daily.) 30 capsule 11   • fludrocortisone 0.1 MG tablet Take 1 tablet by mouth Daily. 90 tablet 3   • FLUOXETINE HCL PO Take 60 mg by mouth Daily.     • Fluticasone Furoate-Vilanterol (BREO ELLIPTA IN) Inhale 2 puffs Daily As Needed.     • furosemide (LASIX) 40 MG tablet Take 1 tablet by mouth Daily. 90 tablet 3   • gabapentin (NEURONTIN) 300 MG capsule 2 (Two) Times a Day.  2   • HYDROcodone-acetaminophen (NORCO) 5-325 MG per tablet Take 1 tablet by mouth As Needed.  0   • ibuprofen (ADVIL,MOTRIN) 800 MG tablet prn     • linaclotide (LINZESS) 72 MCG capsule capsule Take 72 mcg by mouth Every Morning Before Breakfast. Takes 2 capsules daily     • methocarbamol (ROBAXIN) 750 MG tablet 3 (Three) Times a Day.  1   • nitroglycerin (NITROSTAT) 0.4 MG SL tablet 1 under the tongue as needed for angina, may repeat q5mins for up three doses 100 tablet 11   • pantoprazole (PROTONIX) 40 MG EC tablet TAKE 1 TABLET EVERY DAY 90 tablet 1   • potassium chloride 10 MEQ CR tablet Take 1 tablet by mouth Daily. 90 tablet 3   • rizatriptan (MAXALT) 10 MG tablet Take 1 tablet by mouth Daily As Needed.     • simvastatin (ZOCOR) 20 MG tablet TAKE 1 TABLET EVERY NIGHT 90 tablet 1   • sotalol (BETAPACE) 120 MG tablet Take 1 tablet by mouth 2 (Two) Times a Day. 180 tablet 3   • tamsulosin (FLOMAX) 0.4 MG capsule 24 hr capsule Take 1 capsule by mouth Daily.  1   • traMADol (ULTRAM) 50 MG tablet Take 1 tablet by mouth 2 (Two) Times a Day As Needed.     • traZODone (DESYREL) 100 MG tablet  Take 200 mg by mouth Every Night.     • warfarin (COUMADIN) 5 MG tablet Followed by PCP. Currently 5mg qd 30 tablet 5   • ramipril (Altace) 2.5 MG capsule Take 1 capsule by mouth Daily. 90 capsule 3     No current facility-administered medications on file prior to visit.       Patient has no known allergies.    Past Medical History:   Diagnosis Date   • Atrial fibrillation (CMS/HCC)    • Diverticulitis    • Hyperlipidemia    • Hypertension    • ICD (implantable cardioverter-defibrillator) in place        Social History     Socioeconomic History   • Marital status:      Spouse name: Not on file   • Number of children: Not on file   • Years of education: Not on file   • Highest education level: Not on file   Tobacco Use   • Smoking status: Never Smoker   • Smokeless tobacco: Never Used   Substance and Sexual Activity   • Alcohol use: No   • Drug use: No   • Sexual activity: Defer       Family History   Problem Relation Age of Onset   • No Known Problems Mother    • No Known Problems Father        Review of Systems   Constitutional: Negative for chills, fatigue and fever.   HENT: Negative for congestion, rhinorrhea and sore throat.    Eyes: Positive for visual disturbance (glasses).   Respiratory: Positive for chest tightness and shortness of breath. Negative for wheezing.    Cardiovascular: Negative.  Negative for chest pain, palpitations and leg swelling.   Gastrointestinal: Negative.    Endocrine: Negative.    Genitourinary: Negative.    Musculoskeletal: Positive for arthralgias, back pain and neck pain.   Skin: Negative.  Negative for rash and wound.   Allergic/Immunologic: Negative.  Negative for environmental allergies.   Neurological: Positive for dizziness, weakness, numbness (arm left ) and headaches.   Hematological: Bruises/bleeds easily (brusies/ bleeds).   Psychiatric/Behavioral: Negative.  Negative for sleep disturbance.       Objective   Vitals:    07/06/21 1558   BP: 116/76   BP Location: Left  "arm   Patient Position: Sitting   Pulse: 70   SpO2: 98%   Weight: 101 kg (223 lb 9.6 oz)   Height: 190.5 cm (75\")      /76 (BP Location: Left arm, Patient Position: Sitting)   Pulse 70   Ht 190.5 cm (75\")   Wt 101 kg (223 lb 9.6 oz)   SpO2 98%   BMI 27.95 kg/m²    Lab Results (most recent)     None        Physical Exam  Vitals and nursing note reviewed.   Constitutional:       General: He is not in acute distress.     Appearance: He is well-developed.   HENT:      Head: Normocephalic and atraumatic.   Eyes:      Conjunctiva/sclera: Conjunctivae normal.      Pupils: Pupils are equal, round, and reactive to light.   Neck:      Vascular: No JVD.      Trachea: No tracheal deviation.   Cardiovascular:      Rate and Rhythm: Normal rate and regular rhythm.      Heart sounds: Normal heart sounds.   Pulmonary:      Effort: Pulmonary effort is normal.      Breath sounds: Normal breath sounds.   Abdominal:      General: Bowel sounds are normal. There is no distension.      Palpations: Abdomen is soft. There is no mass.      Tenderness: There is no abdominal tenderness. There is no guarding or rebound.   Musculoskeletal:         General: No tenderness or deformity. Normal range of motion.      Cervical back: Normal range of motion and neck supple.   Skin:     General: Skin is warm and dry.      Coloration: Skin is not pale.      Findings: No erythema or rash.   Neurological:      Mental Status: He is alert and oriented to person, place, and time.   Psychiatric:         Behavior: Behavior normal.         Thought Content: Thought content normal.         Judgment: Judgment normal.           Procedure   Procedures       Assessment/Plan      Diagnosis Plan   1. Dilated cardiomyopathy (CMS/HCC)     2. Precordial pain     3. Palpitations     4. Essential hypertension       1.  The patient presents in for follow-up after recent hospitalization for chest pain, palpitations, and history otherwise as above.  He was evaluated " with a nuclear stress test during hospitalization which indicated no evidence of ischemia.  He was discharged by cardiology and advised to follow-up on an outpatient basis.  It is also noted that he had a CT of the head which indicated no cerebral bleed as was previously feared at .    2.  He still has chest pain.  This is significant.  He wants to consider catheterization because of severity of symptoms.  Firstly, I would like to evaluate his previous interrogation.  I feel that a lot of his symptoms are related to dysrhythmic activity.  He reports compliance with sotalol therapy.  I want to evaluate exactly what he is having in terms of dysrhythmias because of ongoing palpitations.    3.  For increasing symptoms, he will proceed back to the emergency room.  If his interrogation is benign, we will have to consider repeat invasive evaluation at that time.    4.  For now I will make no adjustments in medications.  Further pending above.                   Electronically signed by:

## 2021-07-06 NOTE — PATIENT INSTRUCTIONS

## 2021-07-09 RX ORDER — RAMIPRIL 2.5 MG/1
2.5 CAPSULE ORAL DAILY
Qty: 90 CAPSULE | Refills: 3 | Status: SHIPPED | OUTPATIENT
Start: 2021-07-09 | End: 2021-10-22 | Stop reason: SDUPTHER

## 2021-07-13 ENCOUNTER — TELEPHONE (OUTPATIENT)
Dept: CARDIOLOGY | Facility: CLINIC | Age: 56
End: 2021-07-13

## 2021-07-13 NOTE — TELEPHONE ENCOUNTER
PT CALLED AND SAID HE WAS BACK IN [facility] AND HE WAS TOLD HE WAS SUPPOSED TO BE GETTING A CALL WITH AN APPT TO AN ELECTROCARDIOLOGIST AND TO CALL BACK IF THEY DIDN'T CALL WITH HIS APPT, HE SAYS THEY HAVEN'T CALLED.

## 2021-07-23 ENCOUNTER — OFFICE VISIT (OUTPATIENT)
Dept: CARDIOLOGY | Facility: CLINIC | Age: 56
End: 2021-07-23

## 2021-07-23 ENCOUNTER — TELEPHONE (OUTPATIENT)
Dept: CARDIOLOGY | Facility: CLINIC | Age: 56
End: 2021-07-23

## 2021-07-23 DIAGNOSIS — Z95.810 AICD (AUTOMATIC CARDIOVERTER/DEFIBRILLATOR) PRESENT: ICD-10-CM

## 2021-07-23 PROCEDURE — 93289 INTERROG DEVICE EVAL HEART: CPT | Performed by: INTERNAL MEDICINE

## 2021-07-23 NOTE — TELEPHONE ENCOUNTER
Patient wants to know if he is permitted to fly to [place] in the next week or so, also he is was asking if you will be referring him to a specialist for his PVCs, he said you had discussed this.

## 2021-07-27 NOTE — TELEPHONE ENCOUNTER
Left VM for patient - YES he can fly , pacer check was reviewed by Solo CRONIN no current issues were reported we can monitor patient clinically for now , IF patient would still like to be sent to EP to make him feel more at ease we can put in a referral.       AT [facility]

## 2021-09-22 ENCOUNTER — OFFICE VISIT (OUTPATIENT)
Dept: CARDIOLOGY | Facility: CLINIC | Age: 56
End: 2021-09-22

## 2021-09-22 VITALS
HEART RATE: 77 BPM | SYSTOLIC BLOOD PRESSURE: 112 MMHG | OXYGEN SATURATION: 97 % | BODY MASS INDEX: 27.83 KG/M2 | DIASTOLIC BLOOD PRESSURE: 73 MMHG | HEIGHT: 75 IN | WEIGHT: 223.8 LBS

## 2021-09-22 DIAGNOSIS — R06.02 SHORTNESS OF BREATH: ICD-10-CM

## 2021-09-22 DIAGNOSIS — Z95.810 AICD (AUTOMATIC CARDIOVERTER/DEFIBRILLATOR) PRESENT: ICD-10-CM

## 2021-09-22 DIAGNOSIS — I42.0 DILATED CARDIOMYOPATHY (HCC): ICD-10-CM

## 2021-09-22 DIAGNOSIS — I48.0 PAROXYSMAL ATRIAL FIBRILLATION (HCC): Primary | ICD-10-CM

## 2021-09-22 PROCEDURE — 99214 OFFICE O/P EST MOD 30 MIN: CPT | Performed by: PHYSICIAN ASSISTANT

## 2021-09-22 NOTE — PROGRESS NOTES
Problem list     Subjective   Bill Umana is a 56 y.o. male     Chief Complaint   Patient presents with   • Follow-up     6 month    Problem list  1.  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  Chronic systolic heart failure  2.1 EF estimated at 35-40% by echocardiogram February 2018  2.2 EF estimated in the low 40s post stress December 2018  2.3 EF by ventriculography during catheterization March 2018 at 40-45%  2.4 EF by echocardiogram November 2019 at 35 to 40% with grade 1 diastolic dysfunction  3.  Atrial fibrillation on Coumadin therapy  4.  Dyslipidemia  5.  GERD  6.  Mild mitral and tricuspid regurgitation  7.  Cardiac catheterization March 2018 with normal coronaries and an EF estimated at 40-45%  8.  Nonobstructive carotid artery stenosis with 16 to 49% bilateral internal carotid artery stenosis by duplex November 2019    HPI  The patient presents into the clinic today for follow-up.  He was seen after hospitalization previously.  He had a fall with subsequent suspected head trauma.  Given anticoagulated status, he was eventually transferred on to .  He was not felt to have any significant cerebral bleed.  He at 1 point was scheduled for an echo and a stress test.  Nuclear stress test again supported no evidence of ischemia.  Echo again supported an EF of 35 to 40% with no significant structural abnormalities or change from baseline otherwise.  He eventually was reinstituted on Coumadin.  We had seen him in follow-up at that time.  At last evaluation, the patient reported significant shortness of air associated with tachycardia dysrhythmic activity.  He reports that he feels that this is directly related  to A. fib.  We did do an interrogation.  There was no significant dysrhythmic symptoms noted.  He is very concerned about ongoing tachycardic episodes which he feels is related to A. fib with rapid ventricular response.  He again wants consideration for evaluation to an EP provider.  He requests referral to the same.  He has no evidence of failure at this time both subjectively and objectively.  He has no further complaints.    Current Outpatient Medications on File Prior to Visit   Medication Sig Dispense Refill   • aspirin 81 MG tablet Take 1 tablet by mouth Daily. 30 tablet 11   • carvedilol (COREG) 6.25 MG tablet Take 1 tablet by mouth 2 (Two) Times a Day. 180 tablet 3   • dicyclomine (BENTYL) 10 MG capsule Take 1 capsule by mouth Daily. (Patient taking differently: Take 20 mg by mouth Daily.) 30 capsule 11   • fludrocortisone 0.1 MG tablet Take 1 tablet by mouth Daily. 90 tablet 3   • FLUOXETINE HCL PO Take 60 mg by mouth Daily.     • Fluticasone Furoate-Vilanterol (BREO ELLIPTA IN) Inhale 2 puffs Daily As Needed.     • furosemide (LASIX) 40 MG tablet Take 1 tablet by mouth Daily. 90 tablet 3   • gabapentin (NEURONTIN) 300 MG capsule 2 (Two) Times a Day.  2   • HYDROcodone-acetaminophen (NORCO) 5-325 MG per tablet Take 1 tablet by mouth As Needed.  0   • ibuprofen (ADVIL,MOTRIN) 800 MG tablet prn     • linaclotide (LINZESS) 72 MCG capsule capsule Take 72 mcg by mouth Every Morning Before Breakfast. Takes 2 capsules daily     • methocarbamol (ROBAXIN) 750 MG tablet 3 (Three) Times a Day.  1   • nitroglycerin (NITROSTAT) 0.4 MG SL tablet 1 under the tongue as needed for angina, may repeat q5mins for up three doses 100 tablet 11   • pantoprazole (PROTONIX) 40 MG EC tablet TAKE 1 TABLET EVERY DAY 90 tablet 1   • potassium chloride 10 MEQ CR tablet Take 1 tablet by mouth Daily. 90 tablet 3   • ramipril (Altace) 2.5 MG capsule Take 1 capsule by mouth Daily. 90 capsule 3   • rizatriptan (MAXALT) 10 MG tablet Take 1  tablet by mouth Daily As Needed.     • simvastatin (ZOCOR) 20 MG tablet TAKE 1 TABLET EVERY NIGHT 90 tablet 1   • sotalol (BETAPACE) 120 MG tablet Take 1 tablet by mouth 2 (Two) Times a Day. 180 tablet 3   • tamsulosin (FLOMAX) 0.4 MG capsule 24 hr capsule Take 1 capsule by mouth Daily.  1   • traMADol (ULTRAM) 50 MG tablet Take 1 tablet by mouth 2 (Two) Times a Day As Needed.     • traZODone (DESYREL) 100 MG tablet Take 200 mg by mouth Every Night.     • warfarin (COUMADIN) 5 MG tablet Followed by PCP. Currently 5mg qd 30 tablet 5     No current facility-administered medications on file prior to visit.       Patient has no known allergies.    Past Medical History:   Diagnosis Date   • Atrial fibrillation (CMS/HCC)    • Diverticulitis    • Hyperlipidemia    • Hypertension    • ICD (implantable cardioverter-defibrillator) in place        Social History     Socioeconomic History   • Marital status:      Spouse name: Not on file   • Number of children: Not on file   • Years of education: Not on file   • Highest education level: Not on file   Tobacco Use   • Smoking status: Never Smoker   • Smokeless tobacco: Never Used   Substance and Sexual Activity   • Alcohol use: No   • Drug use: No   • Sexual activity: Defer       Family History   Problem Relation Age of Onset   • No Known Problems Mother    • No Known Problems Father        Review of Systems   Constitutional: Positive for fatigue. Negative for chills and fever.   HENT: Negative for congestion, rhinorrhea and sore throat.    Eyes: Positive for visual disturbance (glasses).   Respiratory: Positive for chest tightness and shortness of breath. Negative for wheezing.    Cardiovascular: Negative.  Negative for chest pain, palpitations and leg swelling.   Gastrointestinal: Negative.    Endocrine: Negative.    Genitourinary: Negative.    Musculoskeletal: Positive for arthralgias and back pain. Negative for neck pain.   Skin: Negative.  Negative for rash and wound.  "  Allergic/Immunologic: Negative.  Negative for environmental allergies.   Neurological: Positive for dizziness and headaches. Negative for weakness and numbness.   Hematological: Bruises/bleeds easily (bruises/ bleeds).   Psychiatric/Behavioral: Negative.  Negative for sleep disturbance.       Objective   Vitals:    09/22/21 0830   BP: 112/73   BP Location: Left arm   Patient Position: Sitting   Pulse: 77   SpO2: 97%   Weight: 102 kg (223 lb 12.8 oz)   Height: 190.5 cm (75\")      /73 (BP Location: Left arm, Patient Position: Sitting)   Pulse 77   Ht 190.5 cm (75\")   Wt 102 kg (223 lb 12.8 oz)   SpO2 97%   BMI 27.97 kg/m²    Lab Results (most recent)     None        Physical Exam  Vitals and nursing note reviewed.   Constitutional:       General: He is not in acute distress.     Appearance: He is well-developed.   HENT:      Head: Normocephalic and atraumatic.   Eyes:      Conjunctiva/sclera: Conjunctivae normal.      Pupils: Pupils are equal, round, and reactive to light.   Neck:      Vascular: No JVD.      Trachea: No tracheal deviation.   Cardiovascular:      Rate and Rhythm: Normal rate and regular rhythm.      Heart sounds: Normal heart sounds.   Pulmonary:      Effort: Pulmonary effort is normal.      Breath sounds: Normal breath sounds.   Abdominal:      General: Bowel sounds are normal. There is no distension.      Palpations: Abdomen is soft. There is no mass.      Tenderness: There is no abdominal tenderness. There is no guarding or rebound.   Musculoskeletal:         General: No tenderness or deformity. Normal range of motion.      Cervical back: Normal range of motion and neck supple.   Skin:     General: Skin is warm and dry.      Coloration: Skin is not pale.      Findings: No erythema or rash.   Neurological:      Mental Status: He is alert and oriented to person, place, and time.   Psychiatric:         Behavior: Behavior normal.         Thought Content: Thought content normal.         " Judgment: Judgment normal.           Procedure   Procedures       Assessment/Plan      Diagnosis Plan   1. Paroxysmal atrial fibrillation (HCC)  Ambulatory Referral to Cardiac Electrophysiology   2. Dilated cardiomyopathy (HCC)     3. AICD (automatic cardioverter/defibrillator) present     4. Shortness of breath         1.  The patient has severe dyspnea associated with what he feels is A. fib with rapid ventricular response rates.  He has had no evidence of significant dysrhythmic activity by interrogations frequent enough to explain his significant symptoms and the frequency with which he describes those.  We have reviewed that with him.  He wants evaluation to an electrophysiologist.  He requests a referral to the same.  We will refer him onto an EP provider today.    2.  His recent stress test and echo studies were benign.  I do not feel further cardiac evaluation is warranted.    3.  We have discussed appropriate titration of diuretic therapy.  Again, he has no evidence of failure today.    4.  I will continue medical regimen for now.  I will continue sotalol without change.  We will await recommendations from his EP provider with regards to ongoing antidysrhythmic use and/or change of felt indicated.  He will call for any issues.  We will see him routinely through the clinic.                      Electronically signed by:

## 2021-09-22 NOTE — PATIENT INSTRUCTIONS

## 2021-10-19 DIAGNOSIS — K21.9 GASTROESOPHAGEAL REFLUX DISEASE, UNSPECIFIED WHETHER ESOPHAGITIS PRESENT: ICD-10-CM

## 2021-10-19 RX ORDER — PANTOPRAZOLE SODIUM 40 MG/1
TABLET, DELAYED RELEASE ORAL
Qty: 90 TABLET | Refills: 1 | Status: SHIPPED | OUTPATIENT
Start: 2021-10-19 | End: 2022-02-28

## 2021-10-22 ENCOUNTER — OFFICE VISIT (OUTPATIENT)
Dept: CARDIOLOGY | Facility: CLINIC | Age: 56
End: 2021-10-22

## 2021-10-22 DIAGNOSIS — I47.29 NONSUSTAINED VENTRICULAR TACHYCARDIA (HCC): ICD-10-CM

## 2021-10-22 DIAGNOSIS — I42.0 DILATED CARDIOMYOPATHY (HCC): ICD-10-CM

## 2021-10-22 DIAGNOSIS — I48.0 PAROXYSMAL ATRIAL FIBRILLATION (HCC): ICD-10-CM

## 2021-10-22 DIAGNOSIS — R00.2 PALPITATIONS: Primary | ICD-10-CM

## 2021-10-22 PROCEDURE — 93289 INTERROG DEVICE EVAL HEART: CPT | Performed by: INTERNAL MEDICINE

## 2021-10-22 RX ORDER — RAMIPRIL 2.5 MG/1
2.5 CAPSULE ORAL DAILY
Qty: 90 CAPSULE | Refills: 3 | Status: SHIPPED | OUTPATIENT
Start: 2021-10-22 | End: 2022-01-18

## 2021-11-29 ENCOUNTER — OFFICE VISIT (OUTPATIENT)
Dept: CARDIOLOGY | Facility: CLINIC | Age: 56
End: 2021-11-29

## 2021-11-29 VITALS
WEIGHT: 216.6 LBS | DIASTOLIC BLOOD PRESSURE: 72 MMHG | SYSTOLIC BLOOD PRESSURE: 118 MMHG | HEART RATE: 90 BPM | OXYGEN SATURATION: 96 % | BODY MASS INDEX: 26.93 KG/M2 | HEIGHT: 75 IN

## 2021-11-29 DIAGNOSIS — I48.0 PAROXYSMAL ATRIAL FIBRILLATION (HCC): ICD-10-CM

## 2021-11-29 DIAGNOSIS — I47.29 NONSUSTAINED VENTRICULAR TACHYCARDIA (HCC): ICD-10-CM

## 2021-11-29 DIAGNOSIS — R55 VASOVAGAL SYNCOPE: ICD-10-CM

## 2021-11-29 DIAGNOSIS — Z95.810 AICD (AUTOMATIC CARDIOVERTER/DEFIBRILLATOR) PRESENT: ICD-10-CM

## 2021-11-29 DIAGNOSIS — I50.22 CHRONIC SYSTOLIC CONGESTIVE HEART FAILURE (HCC): ICD-10-CM

## 2021-11-29 DIAGNOSIS — S06.5XAA SDH (SUBDURAL HEMATOMA) (HCC): ICD-10-CM

## 2021-11-29 DIAGNOSIS — I10 ESSENTIAL HYPERTENSION: ICD-10-CM

## 2021-11-29 DIAGNOSIS — I95.1 ORTHOSTATIC HYPOTENSION: Primary | ICD-10-CM

## 2021-11-29 DIAGNOSIS — I42.0 NONISCHEMIC DILATED CARDIOMYOPATHY (HCC): ICD-10-CM

## 2021-11-29 PROBLEM — I65.23 BILATERAL CAROTID ARTERY STENOSIS: Status: ACTIVE | Noted: 2021-11-29

## 2021-11-29 PROCEDURE — 99204 OFFICE O/P NEW MOD 45 MIN: CPT | Performed by: INTERNAL MEDICINE

## 2021-11-29 PROCEDURE — 93000 ELECTROCARDIOGRAM COMPLETE: CPT | Performed by: INTERNAL MEDICINE

## 2021-11-29 RX ORDER — RIMEGEPANT SULFATE 75 MG/75MG
TABLET, ORALLY DISINTEGRATING ORAL AS NEEDED
COMMUNITY

## 2021-11-29 NOTE — PROGRESS NOTES
Cardiac Electrophysiology Outpatient Consult Note            [facility] at [facility]    Consult Note     Bill Umana  [id number]  11/29/2021  [phone]     Primary Care Physician: Kim Goff APRN    Referred By: Solo Jackson PA    Subjective     Chief Complaint:   Diagnoses and all orders for this visit:    1. Paroxysmal atrial fibrillation (HCC) (Primary)  -     Case Request EP Lab: Atrial Appendage Occlusion (Watchman), DNS any meds  -     CT Angiogram Chest With & Without Contrast; Future  -     Intra-Operative Transesophageal Echocardiogram; Future    2. Nonsustained ventricular tachycardia (HCC)    3. Chronic systolic congestive heart failure (HCC)    4. Vasovagal syncope    5. SDH (subdural hematoma) (HCC)    6. Orthostatic hypotension    7. Nonischemic dilated cardiomyopathy (HCC)    8. AICD (automatic cardioverter/defibrillator) present    9. Essential hypertension    Other orders  -     ECG 12 Lead      Chief Complaint   Patient presents with   • Palpitations     Consult    • Loss of Consciousness   • Cardiomyopathy   • PAF       History of Present Illness:   Bill Umana is a 56 y.o. male who presents to my electrophysiology clinic for evaluation of NICM with ICD, paroxysmal atrial fibrillation, recurrent syncope and ongoing palpitations due to awareness of PVCs.  He has a very complicated history which includes nonischemic cardiomyopathy for which she has a dual-chamber ICD.  He has recurrent syncopal episodes.  He had a syncopal episode in July of this year resulting in subdural hematoma treated at [facility].  He reports having had repeat cameron syncope in August of this year.  He states he gets blurring of his vision and dizziness, usually is able to get himself to the ground prior to loss of consciousness.  He has a history of paroxysmal atrial fibrillation for which he is chronically on warfarin.  He presents today with a complaint of  symptomatic palpitations due to awareness of PVCs.  He has awareness of palpitations on a daily basis which he describes as a skipping sensation which is associated with lightheadedness and a sharp left-sided chest pain.  His antihypertensives have been either reduced or eliminated with no improvement in symptoms.  His blood pressures at home currently run between 100 to 120 mmHg systolic with resting heart rates typically in the 50s.  He has no current complaint of exertional chest pain or dyspnea denies orthopnea, PND, claudication, lower extremity edema.    Past Medical History:   Patient Active Problem List    Diagnosis Date Noted   • Bilateral carotid artery stenosis 11/29/2021     Note Last Updated: 11/29/2021     · 16 to 49% bilateral internal carotid artery stenosis by duplex November 2019     • SDH (subdural hematoma) ([facility]) 11/29/2021     Note Last Updated: 11/29/2021     · [facility] 7-23-21     • Chronic fatigue 12/04/2019   • Orthostatic hypotension 02/13/2019   • Palpitations 01/28/2019   • Syncope 01/28/2019   • Nonsustained ventricular tachycardia ([facility]) 03/23/2018     Note Last Updated: 11/29/2021     · interrogation demonstrates 4 episodes of nonsustained ventricular tachycardia and one episode of ventricular tachycardia converted with ATP without defibrillation  · patient on Sotalol for ventricular tachycardia followed by EP services     • AICD (automatic cardioverter/defibrillator) present 03/05/2018     Note Last Updated: 11/29/2021     S/P AICD implant ([employer])      • Paroxysmal atrial fibrillation ([facility]) 03/05/2018   • Hyperlipidemia 03/05/2018   • Essential hypertension 03/05/2018   • Systolic congestive heart failure ([facility]) 02/19/2018   • Nonischemic dilated cardiomyopathy ([facility]) 02/19/2018     Note Last Updated: 11/29/2021     · S/P AICD implant ([employer])   · Cardiac catheterization March 2018 with normal coronaries and an EF estimated at 40-45%  · EF by echocardiogram November  2019 at 35 to 40% with grade 1 diastolic dysfunction  · Echocardiogram, 7/2/2021: Mild concentric LVH.  Moderate global hypokinesis.  EF 35-40%.  Diastolic dysfunction.  Left atrium moderately dilated.  Mild aortic valve sclerosis without stenosis.         Past Surgical History:   Past Surgical History:   Procedure Laterality Date   • BACK SURGERY     • COLON SURGERY  02/07/2020    Part of colon removed for diverticulitis    • COLONOSCOPY     • HERNIA REPAIR     • KNEE SURGERY     • SPINAL CORD STIMULATOR IMPLANT         Social History:   Social History     Socioeconomic History   • Marital status:    Tobacco Use   • Smoking status: Never Smoker   • Smokeless tobacco: Never Used   Substance and Sexual Activity   • Alcohol use: No   • Drug use: No   • Sexual activity: Defer       Medications:     Current Outpatient Medications:   •  aspirin 81 MG tablet, Take 1 tablet by mouth Daily., Disp: 30 tablet, Rfl: 11  •  carvedilol (COREG) 6.25 MG tablet, Take 1 tablet by mouth 2 (Two) Times a Day., Disp: 180 tablet, Rfl: 3  •  dicyclomine (BENTYL) 10 MG capsule, Take 1 capsule by mouth Daily. (Patient taking differently: Take 20 mg by mouth 3 (Three) Times a Day.), Disp: 30 capsule, Rfl: 11  •  fludrocortisone 0.1 MG tablet, Take 1 tablet by mouth Daily., Disp: 90 tablet, Rfl: 3  •  FLUOXETINE HCL PO, Take 60 mg by mouth Daily., Disp: , Rfl:   •  furosemide (LASIX) 40 MG tablet, Take 1 tablet by mouth Daily., Disp: 90 tablet, Rfl: 3  •  gabapentin (NEURONTIN) 300 MG capsule, 2 (Two) Times a Day., Disp: , Rfl: 2  •  ibuprofen (ADVIL,MOTRIN) 800 MG tablet, prn, Disp: , Rfl:   •  linaclotide (LINZESS) 72 MCG capsule capsule, Take 72 mcg by mouth Every Morning Before Breakfast. Takes 2 capsules daily, Disp: , Rfl:   •  methocarbamol (ROBAXIN) 750 MG tablet, 3 (Three) Times a Day., Disp: , Rfl: 1  •  nitroglycerin (NITROSTAT) 0.4 MG SL tablet, 1 under the tongue as needed for angina, may repeat q5mins for up three doses,  "Disp: 100 tablet, Rfl: 11  •  pantoprazole (PROTONIX) 40 MG EC tablet, TAKE 1 TABLET EVERY DAY, Disp: 90 tablet, Rfl: 1  •  potassium chloride 10 MEQ CR tablet, Take 1 tablet by mouth Daily., Disp: 90 tablet, Rfl: 3  •  ramipril (Altace) 2.5 MG capsule, Take 1 capsule by mouth Daily., Disp: 90 capsule, Rfl: 3  •  Rimegepant Sulfate (Nurtec) 75 MG tablet dispersible tablet, Take  by mouth As Needed., Disp: , Rfl:   •  simvastatin (ZOCOR) 20 MG tablet, TAKE 1 TABLET EVERY NIGHT, Disp: 90 tablet, Rfl: 1  •  sotalol (BETAPACE) 120 MG tablet, Take 1 tablet by mouth 2 (Two) Times a Day., Disp: 180 tablet, Rfl: 3  •  tamsulosin (FLOMAX) 0.4 MG capsule 24 hr capsule, Take 1 capsule by mouth Daily., Disp: , Rfl: 1  •  traMADol (ULTRAM) 50 MG tablet, Take 1 tablet by mouth 2 (Two) Times a Day As Needed., Disp: , Rfl:   •  traZODone (DESYREL) 100 MG tablet, Take 100 mg by mouth Every Night., Disp: , Rfl:   •  warfarin (COUMADIN) 5 MG tablet, Followed by PCP. Currently 5mg qd, Disp: 30 tablet, Rfl: 5    Allergies:   No Known Allergies    Objective   Vital Signs:   Vitals:    11/29/21 1443   BP: 118/72   BP Location: Left arm   Patient Position: Sitting   Pulse: 90   SpO2: 96%   Weight: 98.2 kg (216 lb 9.6 oz)   Height: 190.5 cm (75\")       PHYSICAL EXAM  General appearance: Awake, alert, cooperative  Head: Normocephalic, without obvious abnormality, atraumatic  Eyes: Conjunctivae/corneas clear, EOMs intact  Neck: no adenopathy, no carotid bruit, no JVD and thyroid: not enlarged  Lungs: clear to auscultation bilaterally and no rhonchi or crackles\", ' symmetric  Heart: regular rate and rhythm with frequent ectopic beats , S1, S2 normal, no murmur, click, rub or gallop  Abdomen: Soft, non-tender, bowel sounds normal,  no organomegaly  Extremities: extremities normal, atraumatic, no cyanosis or edema  Skin: Skin color, turgor normal, no rashes or lesions  Neurologic: Grossly normal     Lab Results   Component Value Date    GLUCOSE " 103 03/05/2018    CALCIUM 9.2 03/05/2018     03/05/2018    K 4.5 03/05/2018    CO2 28.3 03/05/2018     03/05/2018    BUN 12 03/05/2018    CREATININE 1.05 03/05/2018    EGFRIFNONA 74 03/05/2018    BCR 11.4 03/05/2018    ANIONGAP 4.7 03/05/2018       Cardiac Testing:      I personally viewed and interpreted the patient's EKG/Telemetry/lab data      ECG 12 Lead    Date/Time: 11/29/2021 3:18 PM  Performed by: Lobo Downs DO  Authorized by: Lobo Downs DO   Previous ECG: no previous ECG available  Rhythm: sinus rhythm  Ectopy: unifocal PVCs  BPM: 90  Conduction: conduction normal  ST Segments: ST segments normal  T Waves: T waves normal  Other: no other findings    Clinical impression: abnormal EKG            Tobacco Cessation: N/A  Obstructive Sleep Apnea Screening: Deffered    Assessment & Plan    Diagnoses and all orders for this visit:    1. Paroxysmal atrial fibrillation (HCC) (Primary)  -     Case Request EP Lab: Atrial Appendage Occlusion (Watchman), DNS any meds  -     CT Angiogram Chest With & Without Contrast; Future  -     Intra-Operative Transesophageal Echocardiogram; Future    2. Nonsustained ventricular tachycardia (HCC)    3. Chronic systolic congestive heart failure (HCC)    4. Vasovagal syncope    5. SDH (subdural hematoma) (HCC)    6. Orthostatic hypotension    7. Nonischemic dilated cardiomyopathy (HCC)    8. AICD (automatic cardioverter/defibrillator) present    9. Essential hypertension    Other orders  -     ECG 12 Lead         Diagnosis Plan   1. Paroxysmal atrial fibrillation (HCC)   symptomatic paroxysmal atrial fibrillation.  Rhythm control strategy.  Sotalol has been largely effective.    Appropriate anticoagulation with warfarin.  The patient has had recurrent episodes of syncope and presyncope which are vasovagal in nature.  With one such episode he fell hit his head and had intracranial hemorrhage.  This was managed conservatively fortunately for him.  That said he is  back on warfarin and understands that this is risky.    I reviewed with him the watchman option.  This is a device approved by the FDA for left atrial appendage closure to reduce the risk of recurrent cardioembolic stroke from atrial fibrillation and is a viable alternative to anticoagulation for prevention of stroke.  This patient is a poor candidate for long-term anticoagulation due to the above reasons.  He is a reasonable candidate for short to medium term anticoagulation.  I think watchman device would be an excellent choice for him.  Literature was provided to him.  We discussed the risk-benefit profile extensively.  He has a clear understanding.  I quoted him a less than 1% chance of significant procedure complication described the procedure in detail.  Likely he will be able go home the same day.  He wishes to proceed with a watchman closure soon as possible.  We will schedule him for December 16.    The patient and I made a mutually shared decision ( shared decision making model ) that the patient would be best served by proceeding with the above invasive heart procedure.  This is a high risk invasive cardiac procedure which comes with significant risk of morbidity and mortality.  This patient has significant underlying  heart disease.  Bill Umana understands these risks and   has made an informed decision to proceed.    Case Request EP Lab: Atrial Appendage Occlusion (Watchman), DNS any meds    CT Angiogram Chest With & Without Contrast    Intra-Operative Transesophageal Echocardiogram   2. Nonsustained ventricular tachycardia (HCC)   symptoms.  Mostly from PVCs at this point.  Sotalol has been somewhat effective but he still has occasional PVCs.  The overall PVC burden is not quite high.    This patient may benefit from catheter ablation however at this point it remains to be seen.  We will get him safely through left atrial appendage closure and see how he does at that point.   3. Chronic systolic  congestive heart failure (HCC)   heart failure symptoms stable.  No need for further diuresis.   4. Vasovagal syncope   Rhythmia excluded by ICD.    Symptoms entirely consistent with vasovagal syncope.  Exercise encouraged.  He understands mechanism of vasovagal syncope other quite well.  This does pose significant risk to him while he is on warfarin for recurrent falls trauma to the head and intracranial hemorrhage.  Watchman device is indicated.  See above.   5. SDH (subdural hematoma) (HCC)   in the presence of warfarin and vasovagal syncope.  High risk of recurrence of on anticoagulation.  Watchman.  See above.   6. Orthostatic hypotension   as above.   7. Nonischemic dilated cardiomyopathy (HCC)   on good medical therapy.  Continue ramipril and carvedilol.  New York Heart Association functional class III.   8. AICD (automatic cardioverter/defibrillator) present   [employer] dual-chamber ICD system interrogation reveals normal device programming.  Appropriate function.  Normal appropriate sensing pacing and impedance values.    ICD reprogrammed to minimize the risk of inappropriate ICD shocks in accordance with guidelines.      This patient's Cardiac Implanted Electronic Device was manually interrogated and reprogrammed during the patient encounter today.  Iterative programming changes were manually made to determine the sensing threshold, pacing threshold, lead impedance as well as underlying cardiac rhythm.  These programming changes were not limited to but included some or all of the following when appropriate: pacing mode, programmed AV delays, blanking periods, and refractory periods.  Data obtained as a result of these manual programing changes informed the patient's CIED permanent programming.   9. Essential hypertension   blood pressure well controlled.  Doing nicely.     Body mass index is 27.07 kg/m².    I spent 43 minutes in consultation with this patient which included more than 65% of this  time in direct face-to-face counseling, physical examination and discussion of my assessment and findings and shared decision making with the patient.  The remainder of the time not spent face to face was performing one, some or all of the following actions:  preparing to see this patient ( eg. Review of tests),  ordering medications, tests or procedures ), care coordination, discussion of the plan with other healthcare providers, documenting clinical information in Epic well as independently interpreting results and communicating results to patient, family and or caregiver.  All time noted occurred on the date of service.        Total Score: 3      [id number] Score Stroke Risk %  per annum   0 0   1 1.3   2 2.2   3 3.2   4 4.0   5 6.7   6 9.8   7 9.6   8 12.2   9 15.6     .........................................................................  Definitions:  CHF or LV Dysfunction = 1  Hypertension (consistently above 140/90 mmHg (or treated hypertension on medication) = 1  Age 65 to 74 = 1 // 75 or greater = 2  Diabetes Mellitus = 1  Vascular Disease (heart or any arterial vascular bed, aortic plaque, prior MI) = 1  Female sex (as a sole risk factor this dose confer increased risk) = 1  Prior ischemic CVA or documented cardio-embolic event = 2                                      HAS-Bled Score for Major Bleeding Risk       Question  Yes  No    1. Hypertension History - BP Systolic >160 mmHg, no treatment 1    2.   Renal Disease - Creatinine higher than 2.6 mg/dL (200mmol/L)     3.   Liver Disease - Bilirubin higher 2x or AST/ALT/AP higher 3x normal     4.   Stroke History      5.   History of major bleeding or predisposition  1    6.   Labile INR - unstable/high INRs, time in Therapeutic Range <60% 1    7.   Age > 65      8.   Under medication that predisposes to bleeding  1    9.   Alcohol or drug usage history - more than 7 drinks per week              Score Major bleeding risk   0 1.10%   1 3.40%   2 4.10%    3 5.80%   4 8.90%   5 9.10%   >6 higher %     Follow Up: After procedure      Thank you for allowing me to participate in the care of your patient. Please to not hesitate to contact me with additional questions or concerns.      Lobo Downs DO, FAC, [facility]  Cardiac Electrophysiologist  [facility] / [facility]    Scribed for Lobo Downs DO Electronically signed by ROSEANNE Reyes, 11/29/21, 3:18 PM EST.

## 2021-11-30 ENCOUNTER — TELEPHONE (OUTPATIENT)
Dept: CARDIOLOGY | Facility: CLINIC | Age: 56
End: 2021-11-30

## 2021-11-30 ENCOUNTER — PREP FOR SURGERY (OUTPATIENT)
Dept: OTHER | Facility: HOSPITAL | Age: 56
End: 2021-11-30

## 2021-11-30 DIAGNOSIS — I48.0 PAROXYSMAL ATRIAL FIBRILLATION (HCC): Primary | ICD-10-CM

## 2021-11-30 RX ORDER — ACETAMINOPHEN 325 MG/1
650 TABLET ORAL EVERY 4 HOURS PRN
Status: CANCELLED | OUTPATIENT
Start: 2021-11-30

## 2021-11-30 RX ORDER — SODIUM CHLORIDE 0.9 % (FLUSH) 0.9 %
3 SYRINGE (ML) INJECTION EVERY 12 HOURS SCHEDULED
Status: CANCELLED | OUTPATIENT
Start: 2021-11-30

## 2021-11-30 RX ORDER — NITROGLYCERIN 0.4 MG/1
0.4 TABLET SUBLINGUAL
Status: CANCELLED | OUTPATIENT
Start: 2021-11-30

## 2021-11-30 RX ORDER — CEFAZOLIN SODIUM 2 G/100ML
2 INJECTION, SOLUTION INTRAVENOUS
Status: CANCELLED | OUTPATIENT
Start: 2021-11-30

## 2021-11-30 RX ORDER — SODIUM CHLORIDE 0.9 % (FLUSH) 0.9 %
10 SYRINGE (ML) INJECTION AS NEEDED
Status: CANCELLED | OUTPATIENT
Start: 2021-11-30

## 2021-11-30 RX ORDER — ONDANSETRON 2 MG/ML
4 INJECTION INTRAMUSCULAR; INTRAVENOUS EVERY 6 HOURS PRN
Status: CANCELLED | OUTPATIENT
Start: 2021-11-30

## 2021-11-30 NOTE — TELEPHONE ENCOUNTER
Left message for pt to call me.  Following up on Watchman implant for 12/16/21.    Catherine Turner RN

## 2021-12-13 ENCOUNTER — HOSPITAL ENCOUNTER (OUTPATIENT)
Dept: CT IMAGING | Facility: HOSPITAL | Age: 56
Discharge: HOME | End: 2021-12-13

## 2021-12-13 ENCOUNTER — PRE-ADMISSION TESTING (OUTPATIENT)
Dept: PREADMISSION TESTING | Facility: HOSPITAL | Age: 56
End: 2021-12-13

## 2021-12-13 DIAGNOSIS — I48.0 PAROXYSMAL ATRIAL FIBRILLATION: ICD-10-CM

## 2021-12-13 DIAGNOSIS — I48.0 PAROXYSMAL ATRIAL FIBRILLATION (HCC): ICD-10-CM

## 2021-12-13 LAB
ANION GAP SERPL CALCULATED.3IONS-SCNC: 9 MMOL/L (ref 5–15)
BUN SERPL-MCNC: 11 MG/DL (ref 6–20)
BUN/CREAT SERPL: 12.5 (ref 7–25)
CALCIUM SPEC-SCNC: 9.2 MG/DL (ref 8.6–10.5)
CHLORIDE SERPL-SCNC: 103 MMOL/L (ref 98–107)
CO2 SERPL-SCNC: 25 MMOL/L (ref 22–29)
CREAT SERPL-MCNC: 0.88 MG/DL (ref 0.76–1.27)
DEPRECATED RDW RBC AUTO: 41.5 FL (ref 37–54)
ERYTHROCYTE [DISTWIDTH] IN BLOOD BY AUTOMATED COUNT: 12.9 % (ref 12.3–15.4)
GFR SERPL CREATININE-BSD FRML MDRD: 89 ML/MIN/1.73
GLUCOSE SERPL-MCNC: 109 MG/DL (ref 65–99)
HCT VFR BLD AUTO: 42.5 % (ref 37.5–51)
HGB BLD-MCNC: 14.1 G/DL (ref 13–17.7)
MCH RBC QN AUTO: 29.3 PG (ref 26.6–33)
MCHC RBC AUTO-ENTMCNC: 33.2 G/DL (ref 31.5–35.7)
MCV RBC AUTO: 88.4 FL (ref 79–97)
PLATELET # BLD AUTO: 252 10*3/MM3 (ref 140–450)
PMV BLD AUTO: 10.6 FL (ref 6–12)
POTASSIUM SERPL-SCNC: 4.1 MMOL/L (ref 3.5–5.2)
RBC # BLD AUTO: 4.81 10*6/MM3 (ref 4.14–5.8)
SARS-COV-2 RNA PNL SPEC NAA+PROBE: NOT DETECTED
SODIUM SERPL-SCNC: 137 MMOL/L (ref 136–145)
WBC NRBC COR # BLD: 8.07 10*3/MM3 (ref 3.4–10.8)

## 2021-12-13 PROCEDURE — 36415 COLL VENOUS BLD VENIPUNCTURE: CPT

## 2021-12-13 PROCEDURE — C9803 HOPD COVID-19 SPEC COLLECT: HCPCS

## 2021-12-13 PROCEDURE — 71275 CT ANGIOGRAPHY CHEST: CPT

## 2021-12-13 PROCEDURE — U0004 COV-19 TEST NON-CDC HGH THRU: HCPCS

## 2021-12-13 PROCEDURE — 80048 BASIC METABOLIC PNL TOTAL CA: CPT

## 2021-12-13 PROCEDURE — 0 IOPAMIDOL PER 1 ML: Performed by: INTERNAL MEDICINE

## 2021-12-13 PROCEDURE — 85027 COMPLETE CBC AUTOMATED: CPT

## 2021-12-13 RX ADMIN — IOPAMIDOL 100 ML: 755 INJECTION, SOLUTION INTRAVENOUS at 16:10

## 2021-12-16 ENCOUNTER — HOSPITAL ENCOUNTER (INPATIENT)
Facility: HOSPITAL | Age: 56
LOS: 1 days | Discharge: HOME | End: 2021-12-16
Attending: INTERNAL MEDICINE | Admitting: INTERNAL MEDICINE

## 2021-12-16 ENCOUNTER — HOSPITAL ENCOUNTER (OUTPATIENT)
Dept: CARDIOLOGY | Facility: HOSPITAL | Age: 56
Discharge: HOME | End: 2021-12-16

## 2021-12-16 VITALS — BODY MASS INDEX: 26.86 KG/M2 | WEIGHT: 216 LBS | HEIGHT: 75 IN

## 2021-12-16 VITALS
OXYGEN SATURATION: 95 % | HEART RATE: 60 BPM | DIASTOLIC BLOOD PRESSURE: 71 MMHG | BODY MASS INDEX: 26.92 KG/M2 | SYSTOLIC BLOOD PRESSURE: 96 MMHG | WEIGHT: 216.49 LBS | RESPIRATION RATE: 16 BRPM | TEMPERATURE: 97.6 F | HEIGHT: 75 IN

## 2021-12-16 DIAGNOSIS — I48.0 PAROXYSMAL ATRIAL FIBRILLATION (HCC): ICD-10-CM

## 2021-12-16 DIAGNOSIS — I48.0 PAROXYSMAL ATRIAL FIBRILLATION: ICD-10-CM

## 2021-12-16 LAB
ASCENDING AORTA: 3.1 CM
BH CV ECHO MEAS - BSA(HAYCOCK): 2.3 M^2
BH CV ECHO MEAS - BSA: 2.3 M^2
BH CV ECHO MEAS - BZI_BMI: 27.1 KILOGRAMS/M^2
BH CV ECHO MEAS - BZI_METRIC_HEIGHT: 190 CM
BH CV ECHO MEAS - BZI_METRIC_WEIGHT: 98 KG
BH CV ECHO MEAS - RAP SYSTOLE: 8 MMHG
BH CV ECHO MEAS - RVSP: 33 MMHG
BH CV ECHO MEAS - TR MAX PG: 25 MMHG
BH CV ECHO MEAS - TR MAX VEL: 250.3 CM/SEC
BH CV VAS BP RIGHT ARM: NORMAL MMHG
INR PPP: 1.99 (ref 0.85–1.16)
MAXIMAL PREDICTED HEART RATE: 163 BPM
PROTHROMBIN TIME: 21.8 SECONDS (ref 11.4–14.4)
STRESS TARGET HR: 139 BPM

## 2021-12-16 PROCEDURE — C1893 INTRO/SHEATH, FIXED,NON-PEEL: HCPCS | Performed by: INTERNAL MEDICINE

## 2021-12-16 PROCEDURE — 99238 HOSP IP/OBS DSCHRG MGMT 30/<: CPT | Performed by: INTERNAL MEDICINE

## 2021-12-16 PROCEDURE — 25010000002 PROTAMINE SULFATE PER 10 MG: Performed by: INTERNAL MEDICINE

## 2021-12-16 PROCEDURE — 33340 PERQ CLSR TCAT L ATR APNDGE: CPT | Performed by: INTERNAL MEDICINE

## 2021-12-16 PROCEDURE — C1889 IMPLANT/INSERT DEVICE, NOC: HCPCS | Performed by: INTERNAL MEDICINE

## 2021-12-16 PROCEDURE — 25010000002 MIDAZOLAM PER 1 MG: Performed by: INTERNAL MEDICINE

## 2021-12-16 PROCEDURE — 99152 MOD SED SAME PHYS/QHP 5/>YRS: CPT | Performed by: INTERNAL MEDICINE

## 2021-12-16 PROCEDURE — 0 LIDOCAINE 1 % SOLUTION: Performed by: INTERNAL MEDICINE

## 2021-12-16 PROCEDURE — 25010000002 FENTANYL CITRATE (PF) 50 MCG/ML SOLUTION: Performed by: INTERNAL MEDICINE

## 2021-12-16 PROCEDURE — S0260 H&P FOR SURGERY: HCPCS | Performed by: PHYSICIAN ASSISTANT

## 2021-12-16 PROCEDURE — C1889 IMPLANT/INSERT DEVICE, NOC: HCPCS

## 2021-12-16 PROCEDURE — 25010000002 ONDANSETRON PER 1 MG: Performed by: INTERNAL MEDICINE

## 2021-12-16 PROCEDURE — 25010000002 HEPARIN (PORCINE) PER 1000 UNITS: Performed by: INTERNAL MEDICINE

## 2021-12-16 PROCEDURE — 93355 ECHO TRANSESOPHAGEAL (TEE): CPT | Performed by: INTERNAL MEDICINE

## 2021-12-16 PROCEDURE — C1769 GUIDE WIRE: HCPCS | Performed by: INTERNAL MEDICINE

## 2021-12-16 PROCEDURE — 93799 UNLISTED CV SVC/PROCEDURE: CPT | Performed by: INTERNAL MEDICINE

## 2021-12-16 PROCEDURE — 85610 PROTHROMBIN TIME: CPT | Performed by: INTERNAL MEDICINE

## 2021-12-16 PROCEDURE — 99153 MOD SED SAME PHYS/QHP EA: CPT | Performed by: INTERNAL MEDICINE

## 2021-12-16 PROCEDURE — 0 CEFAZOLIN IN DEXTROSE 2-4 GM/100ML-% SOLUTION: Performed by: PHYSICIAN ASSISTANT

## 2021-12-16 PROCEDURE — 25010000002 IOPAMIDOL 61 % SOLUTION: Performed by: INTERNAL MEDICINE

## 2021-12-16 PROCEDURE — 02L73DK OCCLUSION OF LEFT ATRIAL APPENDAGE WITH INTRALUMINAL DEVICE, PERCUTANEOUS APPROACH: ICD-10-PCS | Performed by: INTERNAL MEDICINE

## 2021-12-16 PROCEDURE — C1760 CLOSURE DEV, VASC: HCPCS | Performed by: INTERNAL MEDICINE

## 2021-12-16 PROCEDURE — 93355 ECHO TRANSESOPHAGEAL (TEE): CPT

## 2021-12-16 PROCEDURE — 85347 COAGULATION TIME ACTIVATED: CPT

## 2021-12-16 DEVICE — CAP DEV PROC WATCHMAN LAA: Type: IMPLANTABLE DEVICE | Status: FUNCTIONAL

## 2021-12-16 DEVICE — LEFT ATRIAL APPENDAGE CLOSURE DEVICE WITH DELIVERY SYSTEM
Type: IMPLANTABLE DEVICE | Status: FUNCTIONAL
Brand: WATCHMAN FLX™

## 2021-12-16 DEVICE — IMPLANTABLE DEVICE: Type: IMPLANTABLE DEVICE | Status: FUNCTIONAL

## 2021-12-16 RX ORDER — PROTAMINE SULFATE 10 MG/ML
INJECTION, SOLUTION INTRAVENOUS AS NEEDED
Status: DISCONTINUED | OUTPATIENT
Start: 2021-12-16 | End: 2021-12-16 | Stop reason: HOSPADM

## 2021-12-16 RX ORDER — ONDANSETRON 2 MG/ML
INJECTION INTRAMUSCULAR; INTRAVENOUS AS NEEDED
Status: DISCONTINUED | OUTPATIENT
Start: 2021-12-16 | End: 2021-12-16 | Stop reason: HOSPADM

## 2021-12-16 RX ORDER — TRAMADOL HYDROCHLORIDE 50 MG/1
50 TABLET ORAL EVERY 6 HOURS PRN
Status: DISCONTINUED | OUTPATIENT
Start: 2021-12-16 | End: 2021-12-16 | Stop reason: HOSPADM

## 2021-12-16 RX ORDER — WARFARIN SODIUM 5 MG/1
5 TABLET ORAL
Status: DISCONTINUED | OUTPATIENT
Start: 2021-12-16 | End: 2021-12-16 | Stop reason: HOSPADM

## 2021-12-16 RX ORDER — SODIUM CHLORIDE 9 MG/ML
INJECTION, SOLUTION INTRAVENOUS CONTINUOUS PRN
Status: DISCONTINUED | OUTPATIENT
Start: 2021-12-16 | End: 2021-12-16 | Stop reason: HOSPADM

## 2021-12-16 RX ORDER — ACETAMINOPHEN 325 MG/1
650 TABLET ORAL EVERY 4 HOURS PRN
Status: DISCONTINUED | OUTPATIENT
Start: 2021-12-16 | End: 2021-12-16 | Stop reason: HOSPADM

## 2021-12-16 RX ORDER — SOTALOL HYDROCHLORIDE 80 MG/1
120 TABLET ORAL 2 TIMES DAILY
Status: DISCONTINUED | OUTPATIENT
Start: 2021-12-16 | End: 2021-12-16 | Stop reason: HOSPADM

## 2021-12-16 RX ORDER — FENTANYL CITRATE 50 UG/ML
INJECTION, SOLUTION INTRAMUSCULAR; INTRAVENOUS AS NEEDED
Status: DISCONTINUED | OUTPATIENT
Start: 2021-12-16 | End: 2021-12-16 | Stop reason: HOSPADM

## 2021-12-16 RX ORDER — SODIUM CHLORIDE 0.9 % (FLUSH) 0.9 %
3 SYRINGE (ML) INJECTION EVERY 12 HOURS SCHEDULED
Status: DISCONTINUED | OUTPATIENT
Start: 2021-12-16 | End: 2021-12-16 | Stop reason: HOSPADM

## 2021-12-16 RX ORDER — LIDOCAINE HYDROCHLORIDE 10 MG/ML
INJECTION, SOLUTION INFILTRATION; PERINEURAL AS NEEDED
Status: DISCONTINUED | OUTPATIENT
Start: 2021-12-16 | End: 2021-12-16 | Stop reason: HOSPADM

## 2021-12-16 RX ORDER — NITROGLYCERIN 0.4 MG/1
0.4 TABLET SUBLINGUAL
Status: DISCONTINUED | OUTPATIENT
Start: 2021-12-16 | End: 2021-12-16 | Stop reason: HOSPADM

## 2021-12-16 RX ORDER — BUPIVACAINE HYDROCHLORIDE 5 MG/ML
INJECTION, SOLUTION PERINEURAL AS NEEDED
Status: DISCONTINUED | OUTPATIENT
Start: 2021-12-16 | End: 2021-12-16 | Stop reason: HOSPADM

## 2021-12-16 RX ORDER — MIDAZOLAM HYDROCHLORIDE 1 MG/ML
INJECTION INTRAMUSCULAR; INTRAVENOUS AS NEEDED
Status: DISCONTINUED | OUTPATIENT
Start: 2021-12-16 | End: 2021-12-16 | Stop reason: HOSPADM

## 2021-12-16 RX ORDER — SODIUM CHLORIDE 0.9 % (FLUSH) 0.9 %
10 SYRINGE (ML) INJECTION AS NEEDED
Status: DISCONTINUED | OUTPATIENT
Start: 2021-12-16 | End: 2021-12-16 | Stop reason: HOSPADM

## 2021-12-16 RX ORDER — HEPARIN SODIUM 1000 [USP'U]/ML
INJECTION, SOLUTION INTRAVENOUS; SUBCUTANEOUS AS NEEDED
Status: DISCONTINUED | OUTPATIENT
Start: 2021-12-16 | End: 2021-12-16 | Stop reason: HOSPADM

## 2021-12-16 RX ORDER — CEFAZOLIN SODIUM 2 G/100ML
2 INJECTION, SOLUTION INTRAVENOUS
Status: COMPLETED | OUTPATIENT
Start: 2021-12-16 | End: 2021-12-16

## 2021-12-16 RX ORDER — ASPIRIN 81 MG/1
81 TABLET ORAL DAILY
Status: DISCONTINUED | OUTPATIENT
Start: 2021-12-17 | End: 2021-12-16

## 2021-12-16 RX ORDER — ASPIRIN 81 MG/1
81 TABLET ORAL DAILY
Status: DISCONTINUED | OUTPATIENT
Start: 2021-12-16 | End: 2021-12-16 | Stop reason: HOSPADM

## 2021-12-16 RX ORDER — CARVEDILOL 6.25 MG/1
6.25 TABLET ORAL 2 TIMES DAILY
Status: DISCONTINUED | OUTPATIENT
Start: 2021-12-16 | End: 2021-12-16 | Stop reason: HOSPADM

## 2021-12-16 RX ORDER — ONDANSETRON 2 MG/ML
4 INJECTION INTRAMUSCULAR; INTRAVENOUS EVERY 6 HOURS PRN
Status: DISCONTINUED | OUTPATIENT
Start: 2021-12-16 | End: 2021-12-16 | Stop reason: HOSPADM

## 2021-12-16 RX ADMIN — CEFAZOLIN SODIUM 2 G: 2 INJECTION, SOLUTION INTRAVENOUS at 13:05

## 2021-12-16 RX ADMIN — WARFARIN SODIUM 5 MG: 5 TABLET ORAL at 17:07

## 2021-12-16 RX ADMIN — ASPIRIN 81 MG: 81 TABLET, COATED ORAL at 17:07

## 2021-12-17 ENCOUNTER — CALL CENTER PROGRAMS (OUTPATIENT)
Dept: CALL CENTER | Facility: HOSPITAL | Age: 56
End: 2021-12-17

## 2021-12-17 NOTE — OUTREACH NOTE
PCI/Device Survey      Responses   Facility patient discharged from? [facility]   Procedure date 12/16/21   Procedure (if device, specify in description) PCI  [Watchman]   PCI site Right,  Groin   Performing MD Other (annotate)  [dr Downs]   Attempt successful? No   Unsuccessful attempts Attempt 1          Chanelle Rosales RN

## 2021-12-17 NOTE — OUTREACH NOTE
PCI/Device Survey      Responses   Facility patient discharged from? [facility]   Procedure date 12/16/21   Procedure (if device, specify in description) PCI   PCI site Right,  Groin   Performing MD Other (annotate)   Attempt successful? No   Unsuccessful attempts Attempt 2          Chanelle Rosales RN

## 2021-12-22 LAB — ACT BLD: >1000 SECONDS (ref 82–152)

## 2022-01-05 DIAGNOSIS — I48.0 PAROXYSMAL ATRIAL FIBRILLATION: Primary | ICD-10-CM

## 2022-01-05 DIAGNOSIS — Z95.818 PRESENCE OF WATCHMAN LEFT ATRIAL APPENDAGE CLOSURE DEVICE: ICD-10-CM

## 2022-01-18 RX ORDER — RAMIPRIL 2.5 MG/1
CAPSULE ORAL
Qty: 90 CAPSULE | Refills: 3 | Status: SHIPPED | OUTPATIENT
Start: 2022-01-18 | End: 2022-10-17

## 2022-01-26 ENCOUNTER — TELEPHONE (OUTPATIENT)
Dept: CARDIOLOGY | Facility: CLINIC | Age: 57
End: 2022-01-26

## 2022-01-26 NOTE — TELEPHONE ENCOUNTER
Received cardiac clearance request from  stating pt has EGD scheduled for 02/25/2022 and is requiring a cardiac clearance. Placed cardiac clearance request in Aysa's inbox to review and address with provider.

## 2022-01-28 ENCOUNTER — PREP FOR SURGERY (OUTPATIENT)
Dept: OTHER | Facility: HOSPITAL | Age: 57
End: 2022-01-28

## 2022-01-28 ENCOUNTER — OFFICE VISIT (OUTPATIENT)
Dept: CARDIOLOGY | Facility: CLINIC | Age: 57
End: 2022-01-28

## 2022-01-28 DIAGNOSIS — Z95.818 PRESENCE OF WATCHMAN LEFT ATRIAL APPENDAGE CLOSURE DEVICE: ICD-10-CM

## 2022-01-28 DIAGNOSIS — I48.0 PAROXYSMAL ATRIAL FIBRILLATION: Primary | ICD-10-CM

## 2022-01-28 DIAGNOSIS — Z95.810 AICD (AUTOMATIC CARDIOVERTER/DEFIBRILLATOR) PRESENT: ICD-10-CM

## 2022-01-28 DIAGNOSIS — I42.0 NONISCHEMIC DILATED CARDIOMYOPATHY: ICD-10-CM

## 2022-01-28 DIAGNOSIS — I47.29 NONSUSTAINED VENTRICULAR TACHYCARDIA: ICD-10-CM

## 2022-01-28 PROCEDURE — 93289 INTERROG DEVICE EVAL HEART: CPT | Performed by: INTERNAL MEDICINE

## 2022-01-28 RX ORDER — SODIUM CHLORIDE 0.9 % (FLUSH) 0.9 %
10 SYRINGE (ML) INJECTION AS NEEDED
Status: CANCELLED | OUTPATIENT
Start: 2022-01-28

## 2022-01-28 RX ORDER — SODIUM CHLORIDE 0.9 % (FLUSH) 0.9 %
3 SYRINGE (ML) INJECTION EVERY 12 HOURS SCHEDULED
Status: CANCELLED | OUTPATIENT
Start: 2022-01-28

## 2022-02-09 ENCOUNTER — HOSPITAL ENCOUNTER (OUTPATIENT)
Dept: CARDIOLOGY | Facility: HOSPITAL | Age: 57
Discharge: HOME | End: 2022-02-09
Admitting: INTERNAL MEDICINE

## 2022-02-09 VITALS
RESPIRATION RATE: 12 BRPM | BODY MASS INDEX: 26.86 KG/M2 | HEIGHT: 75 IN | DIASTOLIC BLOOD PRESSURE: 77 MMHG | HEART RATE: 76 BPM | SYSTOLIC BLOOD PRESSURE: 127 MMHG | WEIGHT: 216 LBS | OXYGEN SATURATION: 97 %

## 2022-02-09 DIAGNOSIS — Z95.818 PRESENCE OF WATCHMAN LEFT ATRIAL APPENDAGE CLOSURE DEVICE: ICD-10-CM

## 2022-02-09 DIAGNOSIS — I48.0 PAROXYSMAL ATRIAL FIBRILLATION: ICD-10-CM

## 2022-02-09 LAB
BH CV ECHO MEAS - AO ROOT DIAM: 3.5 CM
BH CV ECHO MEAS - BSA(HAYCOCK): 2.3 M^2
BH CV ECHO MEAS - BSA: 2.3 M^2
BH CV ECHO MEAS - BZI_BMI: 27 KILOGRAMS/M^2
BH CV ECHO MEAS - BZI_METRIC_HEIGHT: 190.5 CM
BH CV ECHO MEAS - BZI_METRIC_WEIGHT: 98 KG
BH CV VAS BP LEFT ARM: NORMAL MMHG
MAXIMAL PREDICTED HEART RATE: 163 BPM
STRESS TARGET HR: 139 BPM

## 2022-02-09 PROCEDURE — 93325 DOPPLER ECHO COLOR FLOW MAPG: CPT

## 2022-02-09 PROCEDURE — 93321 DOPPLER ECHO F-UP/LMTD STD: CPT | Performed by: INTERNAL MEDICINE

## 2022-02-09 PROCEDURE — 93325 DOPPLER ECHO COLOR FLOW MAPG: CPT | Performed by: INTERNAL MEDICINE

## 2022-02-09 PROCEDURE — 93321 DOPPLER ECHO F-UP/LMTD STD: CPT

## 2022-02-09 PROCEDURE — 93312 ECHO TRANSESOPHAGEAL: CPT

## 2022-02-09 PROCEDURE — 93312 ECHO TRANSESOPHAGEAL: CPT | Performed by: INTERNAL MEDICINE

## 2022-02-09 PROCEDURE — 25010000002 MIDAZOLAM PER 1 MG: Performed by: INTERNAL MEDICINE

## 2022-02-09 RX ORDER — CLOPIDOGREL BISULFATE 75 MG/1
75 TABLET ORAL DAILY
Qty: 30 TABLET | Refills: 6 | Status: SHIPPED | OUTPATIENT
Start: 2022-02-09 | End: 2022-08-15

## 2022-02-09 RX ORDER — MIDAZOLAM HYDROCHLORIDE 1 MG/ML
INJECTION INTRAMUSCULAR; INTRAVENOUS
Status: COMPLETED | OUTPATIENT
Start: 2022-02-09 | End: 2022-02-09

## 2022-02-09 RX ADMIN — MIDAZOLAM HYDROCHLORIDE 2 MG: 1 INJECTION, SOLUTION INTRAMUSCULAR; INTRAVENOUS at 14:06

## 2022-02-09 NOTE — PROGRESS NOTES
Patient underwent his post watchman placement JORGE L today.  Discharge the device is well-seated with no peridevice flow.  No device associated thrombus.  At this time we will discontinue the patient's warfarin.  Will prescribe him Plavix 75 mg daily he will take this together with aspirin 81 mg until notified by our EP office.

## 2022-02-09 NOTE — H&P
[facility] Cardiology    Date of Hospital Visit: 22    Place of Service: [facility]    Patient Name: Bill Umana  :1965      Primary Care Provider: Kim Goff APRN    Chief complaint/Reason for Consultation:  Atrial Fibrillation    Problem List:     • Bilateral carotid artery stenosis 2021       Note Last Updated: 2021       · 16 to 49% bilateral internal carotid artery stenosis by duplex 2019      • SDH (subdural hematoma) ([facility]) 2021       Note Last Updated: 2021       · [facility] 7-23-21      • Chronic fatigue 2019   • Orthostatic hypotension 2019   • Palpitations 2019   • Syncope 2019   • Nonsustained ventricular tachycardia ([facility]) 2018       Note Last Updated: 2021       · interrogation demonstrates 4 episodes of nonsustained ventricular tachycardia and one episode of ventricular tachycardia converted with ATP without defibrillation  · patient on Sotalol for ventricular tachycardia followed by EP services      • AICD (automatic cardioverter/defibrillator) present 2018       Note Last Updated: 2021       S/P AICD implant ([employer])       • Paroxysmal atrial fibrillation (HCC) 2018    ·  watchman device placement per [place].  ·  intraoperative JORGE L: LVEF =26 to 30%.  LV cavity mild to moderately dilated.  LA appendage status post well-positioned 24 mm watchman FLX device.  No.  Prosthetic leak present.  Mild TR.    • Hyperlipidemia 2018   • Essential hypertension 2018   • Systolic congestive heart failure (HCC) 2018   • Nonischemic dilated cardiomyopathy (HCC) 2018       Note Last Updated: 2022       · S/P AICD implant ([employer])   · Cardiac catheterization 2018 with normal coronaries and an EF estimated at 40-45%  · EF by echocardiogram 2019 at 35 to 40% with grade 1 diastolic dysfunction  · Echocardiogram,  7/2/2021: Mild concentric LVH.  Moderate global hypokinesis.  EF 35-40%.  Diastolic dysfunction.  Left atrium moderately dilated.  Mild aortic valve sclerosis without stenosis.         History of Present Illness:  Bill Umana is a 56-year-old  male with above past medical history who presents to [facility] on 2/9/2022 for JORGE L evaluation of previously placed watchman device by .    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).    Due to the patient's myriad of cardiovascular issues, and his history of traumatic subdural hematoma secondary to syncopal episodes, the patient underwent a watchman device implantation on 11/21 as means to occlude patient's left atrial appendage as means to discontinue anticoagulation.    Since evaluation with Dr. Conner on 11/21, the patient denies any episodes of syncope but he does appreciate intermittent episodes of atrial fibrillation and V. Tach.  Patient today appreciates symptomatic skipping/palpitations when his rhythm is abnormal.      No Known Allergies    Current Outpatient Medications   Medication Instructions   • aspirin 81 mg, Oral, Daily   • carvedilol (COREG) 6.25 mg, Oral, 2 Times Daily   • dicyclomine (BENTYL) 10 mg, Oral, Daily   • fludrocortisone 0.1 mg, Oral, Daily   • FLUOXETINE HCL PO 60 mg, Oral, Daily   • furosemide (LASIX) 40 mg, Oral, Daily   • gabapentin (NEURONTIN) 300 MG capsule 2 Times Daily - RT   • ibuprofen (ADVIL,MOTRIN) 800 mg, Oral, Every 6 Hours PRN, prn   • linaclotide (LINZESS) 72 mcg, Oral, Every Morning Before Breakfast   • methocarbamol (ROBAXIN) 750 mg, Oral, 3 Times Daily  "  • nitroglycerin (NITROSTAT) 0.4 MG SL tablet 1 under the tongue as needed for angina, may repeat q5mins for up three doses   • pantoprazole (PROTONIX) 40 MG EC tablet TAKE 1 TABLET EVERY DAY   • potassium chloride 10 MEQ CR tablet 10 mEq, Oral, Daily   • ramipril (ALTACE) 2.5 MG capsule TAKE 1 CAPSULE BY MOUTH EVERY DAY   • Rimegepant Sulfate (Nurtec) 75 MG tablet dispersible tablet Oral, As Needed   • simvastatin (ZOCOR) 20 MG tablet TAKE 1 TABLET EVERY NIGHT   • sotalol (BETAPACE) 120 mg, Oral, 2 Times Daily   • tamsulosin (FLOMAX) 0.4 MG capsule 24 hr capsule 1 capsule, Oral, Daily   • traMADol (ULTRAM) 50 MG tablet 1 tablet, Oral, 2 Times Daily PRN   • traZODone (DESYREL) 100 mg, Oral, Nightly   • warfarin (COUMADIN) 5 MG tablet Followed by PCP. Currently 5mg qd           Social History     Socioeconomic History   • Marital status:    Tobacco Use   • Smoking status: Never Smoker   • Smokeless tobacco: Never Used   Substance and Sexual Activity   • Alcohol use: No   • Drug use: No   • Sexual activity: Defer       Family History   Problem Relation Age of Onset   • No Known Problems Mother    • No Known Problems Father        REVIEW OF SYSTEMS:   Review of Systems   Cardiovascular: Positive for palpitations.   All other systems reviewed and are negative.         Objective:  Vitals:    02/09/22 1308   BP: 125/83   Pulse: 65   Resp: 12   SpO2: 95%   Weight: 98 kg (216 lb)   Height: 190.5 cm (75\")     Body mass index is 27 kg/m².      Vitals and nursing note reviewed.   Constitutional:       General: Awake. Not in acute distress.     Appearance: Well-developed and not in distress.   Eyes:      General: No scleral icterus.     Conjunctiva/sclera: Conjunctivae normal.      Pupils: Pupils are equal, round, and reactive to light.   HENT:      Head: Normocephalic and atraumatic.      Nose: Nose normal.    Mouth/Throat:      Pharynx: Uvula midline.   Neck:      Thyroid: No thyromegaly.      Trachea: No tracheal " deviation.      Lymphadenopathy: No cervical adenopathy.   Pulmonary:      Effort: Pulmonary effort is normal.      Breath sounds: Normal breath sounds. No wheezing. No rhonchi. No rales.   Cardiovascular:      Normal rate. Regular rhythm. Normal S1. Normal S2.      Murmurs: There is no murmur.      No gallop. No click. No rub.   Pulses:     Intact distal pulses.   Edema:     Peripheral edema absent.   Abdominal:      General: Bowel sounds are normal.      Palpations: Abdomen is soft. There is no abdominal mass.      Tenderness: There is no abdominal tenderness. There is no guarding.   Musculoskeletal:         General: No tenderness.      Extremities: No clubbing present.     Cervical back: Normal range of motion and neck supple. Skin:     General: Skin is warm and dry. There is no cyanosis.      Findings: No erythema or rash.   Neurological:      Mental Status: Alert, oriented to person, place, and time and oriented to person, place and time.   Psychiatric:         Attention and Perception: Attention normal.         Mood and Affect: Mood normal.         Speech: Speech normal.         Behavior: Behavior normal. Behavior is cooperative.             Lab Review:               CrCl cannot be calculated (Patient's most recent lab result is older than the maximum 30 days allowed.).    No results found for: CHOL, CHLPL, TRIG, HDL, LDL, LDLDIRECT          Assessment:   · Paroxysmal atrial fibrillation with previously placed watchman device        Plan:   · JORGE L today to assess previously placed watchman device per Dr. Downs.    The patient is n.p.o., understands the procedure, understands the risks of procedure, and is willing to proceed with JORGE L evaluation of previously placed watchman device.    Electronically signed by Jones Patel PA-C, 02/09/22, 12:56 PM EST.

## 2022-02-11 ENCOUNTER — TELEPHONE (OUTPATIENT)
Dept: CARDIOLOGY | Facility: CLINIC | Age: 57
End: 2022-02-11

## 2022-02-11 NOTE — TELEPHONE ENCOUNTER
Patient said that that he had JORGE L yesterday was taken off Coumadin and changed to Plavix. He said that he was denied for Procedure with Dr Dunlap. He would like to know if he can have procedure now that he is on Plavix .    Ok per Solo CRONNI to have procedure with Dr Dunlap. Will be slight risk . Called and left message on voice mail for pt.    Called left a message for Dr Dunlap office to re send clearance letter .

## 2022-02-14 ENCOUNTER — TELEPHONE (OUTPATIENT)
Dept: CARDIOLOGY | Facility: CLINIC | Age: 57
End: 2022-02-14

## 2022-02-28 DIAGNOSIS — K21.9 GASTROESOPHAGEAL REFLUX DISEASE, UNSPECIFIED WHETHER ESOPHAGITIS PRESENT: ICD-10-CM

## 2022-02-28 RX ORDER — PANTOPRAZOLE SODIUM 40 MG/1
TABLET, DELAYED RELEASE ORAL
Qty: 90 TABLET | Refills: 1 | Status: SHIPPED | OUTPATIENT
Start: 2022-02-28 | End: 2022-10-17

## 2022-03-17 DIAGNOSIS — E78.2 MIXED HYPERLIPIDEMIA: ICD-10-CM

## 2022-03-17 RX ORDER — SIMVASTATIN 20 MG
TABLET ORAL
Qty: 90 TABLET | Refills: 3 | Status: SHIPPED | OUTPATIENT
Start: 2022-03-17 | End: 2023-01-10

## 2022-03-21 ENCOUNTER — OFFICE VISIT (OUTPATIENT)
Dept: CARDIOLOGY | Facility: CLINIC | Age: 57
End: 2022-03-21

## 2022-03-21 VITALS
DIASTOLIC BLOOD PRESSURE: 78 MMHG | SYSTOLIC BLOOD PRESSURE: 116 MMHG | HEART RATE: 76 BPM | OXYGEN SATURATION: 98 % | WEIGHT: 219 LBS | HEIGHT: 75 IN | BODY MASS INDEX: 27.23 KG/M2

## 2022-03-21 DIAGNOSIS — I48.0 PAROXYSMAL ATRIAL FIBRILLATION: Primary | ICD-10-CM

## 2022-03-21 DIAGNOSIS — R00.2 PALPITATIONS: ICD-10-CM

## 2022-03-21 DIAGNOSIS — I42.0 NONISCHEMIC DILATED CARDIOMYOPATHY: ICD-10-CM

## 2022-03-21 DIAGNOSIS — I95.1 ORTHOSTATIC HYPOTENSION: ICD-10-CM

## 2022-03-21 DIAGNOSIS — Z95.810 AICD (AUTOMATIC CARDIOVERTER/DEFIBRILLATOR) PRESENT: ICD-10-CM

## 2022-03-21 PROBLEM — R55 SYNCOPE: Status: RESOLVED | Noted: 2019-01-28 | Resolved: 2022-03-21

## 2022-03-21 PROCEDURE — 99213 OFFICE O/P EST LOW 20 MIN: CPT | Performed by: PHYSICIAN ASSISTANT

## 2022-03-21 PROCEDURE — 93000 ELECTROCARDIOGRAM COMPLETE: CPT | Performed by: PHYSICIAN ASSISTANT

## 2022-03-21 NOTE — PROGRESS NOTES
Encounter Date:03/21/2022      Patient ID: Bill Umana is a 56 y.o. male.    Kim Goff APRN    Chief Complaint: othostatic hypotension       PROBLEM LIST:  Patient Active Problem List    Diagnosis Date Noted   • Nonsustained ventricular tachycardia (HCC) 03/23/2018     Priority: High     Note Last Updated: 11/29/2021     · interrogation demonstrates 4 episodes of nonsustained ventricular tachycardia and one episode of ventricular tachycardia converted with ATP without defibrillation  · patient on Sotalol for ventricular tachycardia followed by EP services     • AICD (automatic cardioverter/defibrillator) present 03/05/2018     Priority: High     Note Last Updated: 11/29/2021     S/P AICD implant ([employer])      • Paroxysmal atrial fibrillation (HCC) 03/05/2018     Priority: High     Note Last Updated: 3/21/2022     · LAAO 12-16-21  · JORGE L, 2/9/2022: 24 mm watchman left atrial appendage occlusion device in place.  Device is well-seated with no peridevice flow.  No device associated thrombus.     • Nonischemic dilated cardiomyopathy (HCC) 02/19/2018     Priority: High     Note Last Updated: 3/21/2022     · S/P AICD implant ([employer])   · Cardiac catheterization March 2018 with normal coronaries and an EF estimated at 40-45%  · EF by echocardiogram November 2019 at 35 to 40% with grade 1 diastolic dysfunction  · Echocardiogram, 7/2/2021: Mild concentric LVH.  Moderate global hypokinesis.  EF 35-40%.  Diastolic dysfunction.  Left atrium moderately dilated.  Mild aortic valve sclerosis without stenosis.  · JORGE L, 2/9/2022:  EF 26 to 30%.  LV systolic function severely decreased.     • Orthostatic hypotension 02/13/2019     Priority: Medium   • Systolic congestive heart failure (HCC) 02/19/2018     Priority: Medium   • Palpitations 01/28/2019     Priority: Low   • Hyperlipidemia 03/05/2018     Priority: Low   • Essential hypertension 03/05/2018     Priority: Low   • Bilateral carotid  "artery stenosis 11/29/2021     Note Last Updated: 11/29/2021     · 16 to 49% bilateral internal carotid artery stenosis by duplex November 2019     • SDH (subdural hematoma) (HCC) 11/29/2021     Note Last Updated: 11/29/2021     · [facility] 7-23-21     • Chronic fatigue 12/04/2019               History of Present Illness  Patient presents today for follow-up with a history of cardiomyopathy status post ICD, chronic systolic heart failure, atrial fibrillation, SVT and orthostasis.  Since the last time he was seen in our office he had a transesophageal echocardiogram for follow-up after left atrial appendage occlusion.  The exam showed the device was well-seated with no peridevice flow and no associated thrombus.  He returns still complaining of frequent symptomatic palpitations which may last \"all day\".  He states his orthostasis is stable on current treatment.  He has no current complaint of exertional chest pain.  His breathing is \"okay.  He has no current orthopnea PND or lower extremity edema.  His blood pressures at home have been running about 120 mmHg systolic.  States compliance current med regimen reports no significant adverse side effects    No Known Allergies    Current Outpatient Medications   Medication Instructions   • aspirin 81 mg, Oral, Daily   • carvedilol (COREG) 6.25 mg, Oral, 2 Times Daily   • clopidogrel (PLAVIX) 75 mg, Oral, Daily   • dicyclomine (BENTYL) 10 mg, Oral, Daily   • fludrocortisone 0.1 mg, Oral, Daily   • FLUOXETINE HCL PO 60 mg, Oral, Daily   • furosemide (LASIX) 40 mg, Oral, Daily   • gabapentin (NEURONTIN) 300 MG capsule 2 Times Daily - RT   • ibuprofen (ADVIL,MOTRIN) 800 mg, Oral, Every 6 Hours PRN, prn   • linaclotide (LINZESS) 72 mcg, Oral, Every Morning Before Breakfast   • methocarbamol (ROBAXIN) 750 mg, Oral, 3 Times Daily   • nitroglycerin (NITROSTAT) 0.4 MG SL tablet 1 under the tongue as needed for angina, may repeat q5mins for up three doses   • pantoprazole (PROTONIX) 40 " "MG EC tablet TAKE 1 TABLET EVERY DAY   • potassium chloride 10 MEQ CR tablet 10 mEq, Oral, Daily   • ramipril (ALTACE) 2.5 MG capsule TAKE 1 CAPSULE BY MOUTH EVERY DAY   • Rimegepant Sulfate (Nurtec) 75 MG tablet dispersible tablet Oral, As Needed   • simvastatin (ZOCOR) 20 MG tablet TAKE 1 TABLET EVERY NIGHT   • sotalol (BETAPACE) 120 mg, Oral, 2 Times Daily   • tamsulosin (FLOMAX) 0.4 MG capsule 24 hr capsule 1 capsule, Oral, Daily   • traMADol (ULTRAM) 50 MG tablet 1 tablet, Oral, 2 Times Daily PRN   • traZODone (DESYREL) 100 mg, Oral, Nightly       .    Objective:     /78 (BP Location: Left arm, Patient Position: Sitting)   Pulse 76   Ht 190.5 cm (75\")   Wt 99.3 kg (219 lb)   SpO2 98%   BMI 27.37 kg/m²    Body mass index is 27.37 kg/m².     Vitals reviewed.   Constitutional:       Appearance: Well-developed.   Pulmonary:      Effort: Pulmonary effort is normal. No respiratory distress.      Breath sounds: Normal breath sounds. No wheezing. No rales.      Comments: Bases clear  Chest:      Chest wall: Not tender to palpatation.   Cardiovascular:      Normal rate. Regular rhythm.      Murmurs: There is no murmur.      No gallop. No click. No rub.   Pulses:     Intact distal pulses.   Edema:     Peripheral edema absent.   Musculoskeletal: Normal range of motion.       Lab Review:                             ECG 12 Lead    Date/Time: 3/21/2022 2:33 PM  Performed by: Cosme Weaver PA  Authorized by: Cosme Weaver PA   Comparison: compared with previous ECG from 11/29/2021  Similar to previous ECG  Rhythm: sinus rhythm  Rate: normal  BPM: 65  Conduction: conduction normal  Q waves: V1 and V2    ST Segments: ST segments normal  T Waves: T waves normal  QRS axis: normal  Other: no other findings    Clinical impression: abnormal EKG  Comments: QT /453 ms                       Assessment:      Diagnosis Plan   1. Paroxysmal atrial fibrillation (HCC)  ECG 12 Lead today shows sinus rhythm with " stable QT QTC.  Continue sotalol.  He has a well-seated left atrial appendage occlusion device.   2. Nonischemic dilated cardiomyopathy (HCC)   stable nonischemic cardiomyopathy with well managed chronic systolic heart failure NYHA class II.  Normal functioning ICD   3. AICD (automatic cardioverter/defibrillator) present  .  This patient's Cardiac Implanted Electronic Device was manually interrogated and reprogrammed during the patient encounter today.  Iterative programming changes were manually made to determine the sensing threshold, pacing threshold, lead impedance as well as underlying cardiac rhythm.  These programming changes were not limited to but included some or all of the following when appropriate: pacing mode, programmed AV delays, blanking periods, and refractory periods.  Data obtained as a result of these manual programing changes informed the patient's CIED permanent programming.       4. Palpitations   device interrogation shows 2 one-to-one episodes both less than 8 seconds appearing to be atrial tachycardia.  Single mode switch less than 10 seconds.  Significantly reduced number of PVCs compared with previous interrogation   5. Orthostatic hypotension   stable and well managed with current medical regimen     Plan:     Stable cardiac status.  Continue current medications.   in 6 months, sooner as needed.  Thank you for allowing us to participate in the care of your patient.     Electronically signed by ROSEANNE Reyes, 03/21/22, 2:39 PM EDT.

## 2022-03-22 ENCOUNTER — OFFICE VISIT (OUTPATIENT)
Dept: CARDIOLOGY | Facility: CLINIC | Age: 57
End: 2022-03-22

## 2022-03-22 VITALS
HEIGHT: 75 IN | HEART RATE: 62 BPM | WEIGHT: 219.4 LBS | SYSTOLIC BLOOD PRESSURE: 106 MMHG | DIASTOLIC BLOOD PRESSURE: 70 MMHG | BODY MASS INDEX: 27.28 KG/M2 | OXYGEN SATURATION: 97 %

## 2022-03-22 DIAGNOSIS — I10 ESSENTIAL HYPERTENSION: ICD-10-CM

## 2022-03-22 DIAGNOSIS — I48.0 PAROXYSMAL ATRIAL FIBRILLATION: ICD-10-CM

## 2022-03-22 DIAGNOSIS — I42.0 DILATED CARDIOMYOPATHY: Primary | ICD-10-CM

## 2022-03-22 PROCEDURE — 99213 OFFICE O/P EST LOW 20 MIN: CPT | Performed by: PHYSICIAN ASSISTANT

## 2022-03-22 NOTE — PROGRESS NOTES
Problem list     Subjective   Bill Umana is a 56 y.o. male     Chief Complaint   Patient presents with   • Follow-up     6 month    • Atrial Fibrillation   • Cardiomyopathy   Problem list  1.  History of nonischemic dilated cardiomyopathy status post AICD implant ([employer])   1.1 recent interrogation demonstrates 4 episodes of nonsustained ventricular tachycardia and one episode of ventricular tachycardia converted with ATP without defibrillation  1.2 patient on Sotalol for ventricular tachycardia followed by EP services  2.  Chronic systolic heart failure  2.1 EF estimated at 35-40% by echocardiogram February 2018  2.2 EF estimated in the low 40s post stress December 2018  2.3 EF by ventriculography during catheterization March 2018 at 40-45%  2.4 EF by echocardiogram November 2019 at 35 to 40% with grade 1 diastolic dysfunction  3.  Atrial fibrillation   3.1.  Recurrent falls with eventual cerebral bleed.  The patient was not felt a candidate for ongoing anticoagulation and has subsequently had left atrial appendage occluder placement, 12/21.  4.  Dyslipidemia  5.  GERD  6.  Mild mitral and tricuspid regurgitation  7.  Cardiac catheterization March 2018 with normal coronaries and an EF estimated at 40-45%  8.  Nonobstructive carotid artery stenosis with 16 to 49% bilateral internal carotid artery stenosis by duplex November 2019    HPI  The patient presents back for evaluation.  He did have evaluation through EP services.  As he was felt high risk for anticoagulation, he did have watchman procedure, December/2021.  Anticoagulation has been discontinued.  From general cardiovascular standpoint, the patient appears mostly stable.  He has ongoing palpitations, felt to reflect PVCs.  This is followed through his EP provider.  He has had nothing sustained to suggest A. fib at this time symptomatically or by interrogation review.  Interrogations are followed with his EP provider as well.  The patient  appears compensated at this time from standpoint of his cardiomyopathy.  He has no PND orthopnea.  He has stable lower extremity edema.  He denies chest pain.  He has stable dyspnea.  He has no further complaints otherwise at this time.    Current Outpatient Medications on File Prior to Visit   Medication Sig Dispense Refill   • aspirin 81 MG tablet Take 1 tablet by mouth Daily. 30 tablet 11   • carvedilol (COREG) 6.25 MG tablet Take 1 tablet by mouth 2 (Two) Times a Day. 180 tablet 3   • clopidogrel (PLAVIX) 75 MG tablet Take 1 tablet by mouth Daily. 30 tablet 6   • dicyclomine (BENTYL) 10 MG capsule Take 1 capsule by mouth Daily. (Patient taking differently: Take 20 mg by mouth 3 (Three) Times a Day.) 30 capsule 11   • fludrocortisone 0.1 MG tablet Take 1 tablet by mouth Daily. 90 tablet 3   • FLUOXETINE HCL PO Take 60 mg by mouth Daily.     • furosemide (LASIX) 40 MG tablet Take 1 tablet by mouth Daily. 90 tablet 3   • gabapentin (NEURONTIN) 300 MG capsule 2 (Two) Times a Day.  2   • ibuprofen (ADVIL,MOTRIN) 800 MG tablet Take 800 mg by mouth Every 6 (Six) Hours As Needed for Moderate Pain . prn     • linaclotide (LINZESS) 72 MCG capsule capsule Take 72 mcg by mouth Every Morning Before Breakfast.     • methocarbamol (ROBAXIN) 750 MG tablet Take 750 mg by mouth 3 (Three) Times a Day.  1   • nitroglycerin (NITROSTAT) 0.4 MG SL tablet 1 under the tongue as needed for angina, may repeat q5mins for up three doses (Patient taking differently: Place 0.4 mg under the tongue Every 5 (Five) Minutes As Needed for Chest Pain. 1 under the tongue as needed for angina, may repeat q5mins for up three doses) 100 tablet 11   • pantoprazole (PROTONIX) 40 MG EC tablet TAKE 1 TABLET EVERY DAY 90 tablet 1   • potassium chloride 10 MEQ CR tablet Take 1 tablet by mouth Daily. 90 tablet 3   • ramipril (ALTACE) 2.5 MG capsule TAKE 1 CAPSULE BY MOUTH EVERY DAY 90 capsule 3   • Rimegepant Sulfate (Nurtec) 75 MG tablet dispersible tablet  Take  by mouth As Needed (MIGRAINE).     • simvastatin (ZOCOR) 20 MG tablet TAKE 1 TABLET EVERY NIGHT 90 tablet 3   • sotalol (BETAPACE) 120 MG tablet Take 1 tablet by mouth 2 (Two) Times a Day. 180 tablet 3   • tamsulosin (FLOMAX) 0.4 MG capsule 24 hr capsule Take 1 capsule by mouth Daily.  1   • traMADol (ULTRAM) 50 MG tablet Take 1 tablet by mouth 2 (Two) Times a Day As Needed.     • traZODone (DESYREL) 100 MG tablet Take 100 mg by mouth Every Night.       No current facility-administered medications on file prior to visit.       Patient has no known allergies.    Past Medical History:   Diagnosis Date   • Anxiety and depression    • Arthritis    • Chronic bronchitis (HCC)    • Diverticulitis    • Hyperlipidemia    • Migraines        Social History     Socioeconomic History   • Marital status:    Tobacco Use   • Smoking status: Never Smoker   • Smokeless tobacco: Never Used   Substance and Sexual Activity   • Alcohol use: No   • Drug use: No   • Sexual activity: Defer       Family History   Problem Relation Age of Onset   • No Known Problems Mother    • No Known Problems Father        Review of Systems   Constitutional: Positive for fatigue. Negative for chills and fever.   HENT: Negative for congestion, rhinorrhea and sore throat.    Eyes: Positive for visual disturbance (glasses).   Respiratory: Positive for chest tightness and shortness of breath. Negative for wheezing.    Cardiovascular: Positive for palpitations. Negative for chest pain and leg swelling.   Gastrointestinal: Negative.    Endocrine: Negative.    Genitourinary: Negative.    Musculoskeletal: Positive for arthralgias, back pain and neck pain.   Skin: Negative.  Negative for rash and wound.   Allergic/Immunologic: Negative.  Negative for environmental allergies.   Neurological: Positive for dizziness and headaches. Negative for weakness and numbness.   Hematological: Bruises/bleeds easily (bruises / bleeds).   Psychiatric/Behavioral:  "Negative.  Negative for sleep disturbance.       Objective   Vitals:    03/22/22 1012   BP: 106/70   BP Location: Left arm   Patient Position: Sitting   Pulse: 62   SpO2: 97%   Weight: 99.5 kg (219 lb 6.4 oz)   Height: 190.5 cm (75\")      /70 (BP Location: Left arm, Patient Position: Sitting)   Pulse 62   Ht 190.5 cm (75\")   Wt 99.5 kg (219 lb 6.4 oz)   SpO2 97%   BMI 27.42 kg/m²    Lab Results (most recent)     None        Physical Exam  Vitals and nursing note reviewed.   Constitutional:       General: He is not in acute distress.     Appearance: He is well-developed.   HENT:      Head: Normocephalic and atraumatic.   Eyes:      Conjunctiva/sclera: Conjunctivae normal.      Pupils: Pupils are equal, round, and reactive to light.   Neck:      Vascular: No JVD.      Trachea: No tracheal deviation.   Cardiovascular:      Rate and Rhythm: Normal rate and regular rhythm.      Heart sounds: Normal heart sounds.   Pulmonary:      Effort: Pulmonary effort is normal.      Breath sounds: Normal breath sounds.   Abdominal:      General: Bowel sounds are normal. There is no distension.      Palpations: Abdomen is soft. There is no mass.      Tenderness: There is no abdominal tenderness. There is no guarding or rebound.   Musculoskeletal:         General: No tenderness or deformity. Normal range of motion.      Cervical back: Normal range of motion and neck supple.   Skin:     General: Skin is warm and dry.      Coloration: Skin is not pale.      Findings: No erythema or rash.   Neurological:      Mental Status: He is alert and oriented to person, place, and time.   Psychiatric:         Behavior: Behavior normal.         Thought Content: Thought content normal.         Judgment: Judgment normal.           Procedure   Procedures       Assessment/Plan      Diagnosis Plan   1. Dilated cardiomyopathy (HCC)     2. Paroxysmal atrial fibrillation (HCC)     3. Essential hypertension       1.  At this time, the patient " appears stable from general cardiovascular standpoint.  Anticoagulation has now been discontinued as he is status post watchman procedure.  He follows through his EP provider in that regard.  Follow-up apparently has been unremarkable by his report.    2.  The patient is compensated with regards to dilated cardiomyopathy.  I would continue cardioprotective regimen without change.    3.  He has had nothing to suggest sustained A. fib at this time.  He does have ongoing palpitations consistent with PVCs.  He follows through his EP provider with this as well.    4.  As the patient is stable from general cardiovascular standpoint, nothing further and we will continue to see him on routine 6-month intervals.  He will call back for complications.                      Electronically signed by:

## 2022-04-25 ENCOUNTER — TELEPHONE (OUTPATIENT)
Dept: CARDIOLOGY | Facility: CLINIC | Age: 57
End: 2022-04-25

## 2022-04-25 NOTE — TELEPHONE ENCOUNTER
CC received from Dr. Smith patient to be scheduled pending CC - C4-5 ACDF     - per Solo Jackson PA - V/O Dr. Schwarz we need to have Dr. Downs look over and give recommendations on patients antiplatelet therapy  Due to his recent procedure he has with them a few months ago.     CC has been forwarded to Dr. Downs     AT [facility]

## 2022-04-29 NOTE — TELEPHONE ENCOUNTER
I spoke with Amelia from Dr. Arden Robin's office and notified of them of this message, as they was calling for CC update.

## 2022-05-12 ENCOUNTER — TELEPHONE (OUTPATIENT)
Dept: CARDIOLOGY | Facility: CLINIC | Age: 57
End: 2022-05-12

## 2022-05-12 NOTE — TELEPHONE ENCOUNTER
Dr. Arden Robin is requesting clearance for the patient to have a C4-5 anterior cervical diskectomy with fusion. How long should he hold Aspirin and Plavix prior to?          * forward response to Nuvia Abarca*

## 2022-07-20 ENCOUNTER — DOCUMENTATION (OUTPATIENT)
Dept: CARDIOLOGY | Facility: HOSPITAL | Age: 57
End: 2022-07-20

## 2022-07-20 DIAGNOSIS — I48.0 PAROXYSMAL ATRIAL FIBRILLATION: Primary | ICD-10-CM

## 2022-07-20 DIAGNOSIS — Z95.818 PRESENCE OF WATCHMAN LEFT ATRIAL APPENDAGE CLOSURE DEVICE: ICD-10-CM

## 2022-08-15 RX ORDER — CLOPIDOGREL BISULFATE 75 MG/1
TABLET ORAL
Qty: 90 TABLET | Refills: 2 | Status: SHIPPED | OUTPATIENT
Start: 2022-08-15 | End: 2023-03-10 | Stop reason: ALTCHOICE

## 2022-09-23 ENCOUNTER — OFFICE VISIT (OUTPATIENT)
Dept: CARDIOLOGY | Facility: CLINIC | Age: 57
End: 2022-09-23

## 2022-09-23 DIAGNOSIS — I42.0 NONISCHEMIC DILATED CARDIOMYOPATHY: ICD-10-CM

## 2022-09-23 DIAGNOSIS — I47.29 NONSUSTAINED VENTRICULAR TACHYCARDIA: ICD-10-CM

## 2022-09-23 PROCEDURE — 93283 PRGRMG EVAL IMPLANTABLE DFB: CPT | Performed by: INTERNAL MEDICINE

## 2022-09-26 ENCOUNTER — TELEPHONE (OUTPATIENT)
Dept: CARDIOLOGY | Facility: CLINIC | Age: 57
End: 2022-09-26

## 2022-09-26 NOTE — TELEPHONE ENCOUNTER
Per [facility] rep Amelia, patient would like to enroll in remote monitoring with Device change out. He currently has 1.5 years on device longevity

## 2022-10-05 ENCOUNTER — LAB (OUTPATIENT)
Dept: LAB | Facility: HOSPITAL | Age: 57
End: 2022-10-05

## 2022-10-05 ENCOUNTER — OFFICE VISIT (OUTPATIENT)
Dept: CARDIOLOGY | Facility: CLINIC | Age: 57
End: 2022-10-05

## 2022-10-05 VITALS
HEART RATE: 73 BPM | WEIGHT: 232.2 LBS | OXYGEN SATURATION: 98 % | BODY MASS INDEX: 28.87 KG/M2 | DIASTOLIC BLOOD PRESSURE: 64 MMHG | SYSTOLIC BLOOD PRESSURE: 105 MMHG | HEIGHT: 75 IN

## 2022-10-05 DIAGNOSIS — I49.3 FREQUENT PVCS: Primary | ICD-10-CM

## 2022-10-05 DIAGNOSIS — R07.2 PRECORDIAL PAIN: ICD-10-CM

## 2022-10-05 DIAGNOSIS — I42.0 DILATED CARDIOMYOPATHY: ICD-10-CM

## 2022-10-05 DIAGNOSIS — I10 ESSENTIAL HYPERTENSION: ICD-10-CM

## 2022-10-05 DIAGNOSIS — R53.82 CHRONIC FATIGUE: ICD-10-CM

## 2022-10-05 DIAGNOSIS — R06.02 SHORTNESS OF BREATH: ICD-10-CM

## 2022-10-05 DIAGNOSIS — I49.3 FREQUENT PVCS: ICD-10-CM

## 2022-10-05 LAB
ANION GAP SERPL CALCULATED.3IONS-SCNC: 13.3 MMOL/L (ref 5–15)
BASOPHILS # BLD AUTO: 0.04 10*3/MM3 (ref 0–0.2)
BASOPHILS NFR BLD AUTO: 0.4 % (ref 0–1.5)
BUN SERPL-MCNC: 17 MG/DL (ref 6–20)
BUN/CREAT SERPL: 17.3 (ref 7–25)
CALCIUM SPEC-SCNC: 9.3 MG/DL (ref 8.6–10.5)
CHLORIDE SERPL-SCNC: 104 MMOL/L (ref 98–107)
CO2 SERPL-SCNC: 21.7 MMOL/L (ref 22–29)
CREAT SERPL-MCNC: 0.98 MG/DL (ref 0.76–1.27)
DEPRECATED RDW RBC AUTO: 44.5 FL (ref 37–54)
EGFRCR SERPLBLD CKD-EPI 2021: 89.4 ML/MIN/1.73
EOSINOPHIL # BLD AUTO: 0.02 10*3/MM3 (ref 0–0.4)
EOSINOPHIL NFR BLD AUTO: 0.2 % (ref 0.3–6.2)
ERYTHROCYTE [DISTWIDTH] IN BLOOD BY AUTOMATED COUNT: 13.4 % (ref 12.3–15.4)
GLUCOSE SERPL-MCNC: 110 MG/DL (ref 65–99)
HCT VFR BLD AUTO: 45 % (ref 37.5–51)
HGB BLD-MCNC: 15.3 G/DL (ref 13–17.7)
IMM GRANULOCYTES # BLD AUTO: 0.04 10*3/MM3 (ref 0–0.05)
IMM GRANULOCYTES NFR BLD AUTO: 0.4 % (ref 0–0.5)
LYMPHOCYTES # BLD AUTO: 1.03 10*3/MM3 (ref 0.7–3.1)
LYMPHOCYTES NFR BLD AUTO: 9.2 % (ref 19.6–45.3)
MAGNESIUM SERPL-MCNC: 2 MG/DL (ref 1.6–2.6)
MCH RBC QN AUTO: 30.9 PG (ref 26.6–33)
MCHC RBC AUTO-ENTMCNC: 34 G/DL (ref 31.5–35.7)
MCV RBC AUTO: 90.9 FL (ref 79–97)
MONOCYTES # BLD AUTO: 0.38 10*3/MM3 (ref 0.1–0.9)
MONOCYTES NFR BLD AUTO: 3.4 % (ref 5–12)
NEUTROPHILS NFR BLD AUTO: 86.4 % (ref 42.7–76)
NEUTROPHILS NFR BLD AUTO: 9.68 10*3/MM3 (ref 1.7–7)
NRBC BLD AUTO-RTO: 0 /100 WBC (ref 0–0.2)
PLATELET # BLD AUTO: 263 10*3/MM3 (ref 140–450)
PMV BLD AUTO: 11.1 FL (ref 6–12)
POTASSIUM SERPL-SCNC: 5 MMOL/L (ref 3.5–5.2)
RBC # BLD AUTO: 4.95 10*6/MM3 (ref 4.14–5.8)
SODIUM SERPL-SCNC: 139 MMOL/L (ref 136–145)
TSH SERPL DL<=0.05 MIU/L-ACNC: 1.21 UIU/ML (ref 0.27–4.2)
WBC NRBC COR # BLD: 11.19 10*3/MM3 (ref 3.4–10.8)

## 2022-10-05 PROCEDURE — 80048 BASIC METABOLIC PNL TOTAL CA: CPT | Performed by: PHYSICIAN ASSISTANT

## 2022-10-05 PROCEDURE — 93000 ELECTROCARDIOGRAM COMPLETE: CPT | Performed by: PHYSICIAN ASSISTANT

## 2022-10-05 PROCEDURE — 85025 COMPLETE CBC W/AUTO DIFF WBC: CPT | Performed by: PHYSICIAN ASSISTANT

## 2022-10-05 PROCEDURE — 99214 OFFICE O/P EST MOD 30 MIN: CPT | Performed by: PHYSICIAN ASSISTANT

## 2022-10-05 PROCEDURE — 84443 ASSAY THYROID STIM HORMONE: CPT | Performed by: PHYSICIAN ASSISTANT

## 2022-10-05 PROCEDURE — 83735 ASSAY OF MAGNESIUM: CPT | Performed by: PHYSICIAN ASSISTANT

## 2022-10-05 NOTE — PROGRESS NOTES
Problem list     Subjective   Bill Umana is a 57 y.o. male     Chief Complaint   Patient presents with   • Cardiomyopathy     6 month f/u   • Atrial Fibrillation   • Shortness of Breath   Problem list  1.  History of nonischemic dilated cardiomyopathy status post AICD implant ([employer])   1.1 recent interrogation demonstrates 4 episodes of nonsustained ventricular tachycardia and one episode of ventricular tachycardia converted with ATP without defibrillation  1.2 patient on Sotalol for ventricular tachycardia followed by EP services  2.  Chronic systolic heart failure  2.1 EF estimated at 35-40% by echocardiogram February 2018  2.2 EF estimated in the low 40s post stress December 2018  2.3 EF by ventriculography during catheterization March 2018 at 40-45%  2.4 EF by echocardiogram November 2019 at 35 to 40% with grade 1 diastolic dysfunction  3.  Atrial fibrillation   3.1.  Recurrent falls with eventual cerebral bleed.  The patient was not felt a candidate for ongoing anticoagulation and has subsequently had left atrial appendage occluder placement, 12/21.  4.  Dyslipidemia  5.  GERD  6.  Mild mitral and tricuspid regurgitation  7.  Cardiac catheterization March 2018 with normal coronaries and an EF estimated at 40-45%  8.  Nonobstructive carotid artery stenosis with 16 to 49% bilateral internal carotid artery stenosis by duplex November 2019    HPI  The patient presents into the clinic today for follow-up of device interrogation.  His recent device interrogation suggested 1.6 million PVCs with 5 episodes of nonsustained V. tach noted.  He had 1 mode switch lasting only 1 minute 9 seconds.  The device had normal function otherwise.  The patient would like an evaluation today because he is severely symptomatic of his PVCs.  During telemetry/EKG, the patient had fairly frequent PVCs and was severely symptomatic with each episode.  He tells me that he has associated palpitations which are significant and  affecting daily life.  He has associated dyspnea.  He has mild chest pain.  He even has dizziness with the episodes of palpitations when the little more severe.  He has no further complaints.    Current Outpatient Medications on File Prior to Visit   Medication Sig Dispense Refill   • aspirin 81 MG tablet Take 1 tablet by mouth Daily. 30 tablet 11   • carvedilol (COREG) 6.25 MG tablet Take 1 tablet by mouth 2 (Two) Times a Day. 180 tablet 3   • clopidogrel (PLAVIX) 75 MG tablet TAKE 1 TABLET BY MOUTH EVERY DAY 90 tablet 2   • dicyclomine (BENTYL) 10 MG capsule Take 1 capsule by mouth Daily. (Patient taking differently: Take 20 mg by mouth 3 (Three) Times a Day.) 30 capsule 11   • fludrocortisone 0.1 MG tablet Take 1 tablet by mouth Daily. 90 tablet 3   • FLUOXETINE HCL PO Take 60 mg by mouth Daily.     • furosemide (LASIX) 40 MG tablet Take 1 tablet by mouth Daily. 90 tablet 3   • gabapentin (NEURONTIN) 300 MG capsule 2 (Two) Times a Day.  2   • ibuprofen (ADVIL,MOTRIN) 800 MG tablet Take 800 mg by mouth Every 6 (Six) Hours As Needed for Moderate Pain . prn     • linaclotide (LINZESS) 72 MCG capsule capsule Take 72 mcg by mouth Every Morning Before Breakfast.     • methocarbamol (ROBAXIN) 750 MG tablet Take 750 mg by mouth 3 (Three) Times a Day.  1   • nitroglycerin (NITROSTAT) 0.4 MG SL tablet 1 under the tongue as needed for angina, may repeat q5mins for up three doses (Patient taking differently: Place 0.4 mg under the tongue Every 5 (Five) Minutes As Needed for Chest Pain. 1 under the tongue as needed for angina, may repeat q5mins for up three doses) 100 tablet 11   • pantoprazole (PROTONIX) 40 MG EC tablet TAKE 1 TABLET EVERY DAY 90 tablet 1   • potassium chloride 10 MEQ CR tablet Take 1 tablet by mouth Daily. 90 tablet 3   • PREDNISONE PO Take  by mouth.     • ramipril (ALTACE) 2.5 MG capsule TAKE 1 CAPSULE BY MOUTH EVERY DAY 90 capsule 3   • Rimegepant Sulfate (Nurtec) 75 MG tablet dispersible tablet Take  by  mouth As Needed (MIGRAINE).     • simvastatin (ZOCOR) 20 MG tablet TAKE 1 TABLET EVERY NIGHT 90 tablet 3   • sotalol (BETAPACE) 120 MG tablet Take 1 tablet by mouth 2 (Two) Times a Day. 180 tablet 3   • tamsulosin (FLOMAX) 0.4 MG capsule 24 hr capsule Take 1 capsule by mouth Daily.  1   • traMADol (ULTRAM) 50 MG tablet Take 1 tablet by mouth 2 (Two) Times a Day As Needed.     • traZODone (DESYREL) 100 MG tablet Take 100 mg by mouth Every Night.       No current facility-administered medications on file prior to visit.       Patient has no known allergies.    Past Medical History:   Diagnosis Date   • Anxiety and depression    • Arthritis    • Chronic bronchitis (HCC)    • Diverticulitis    • Hyperlipidemia    • Migraines        Social History     Socioeconomic History   • Marital status:    Tobacco Use   • Smoking status: Never Smoker   • Smokeless tobacco: Never Used   Substance and Sexual Activity   • Alcohol use: No   • Drug use: No   • Sexual activity: Defer       Family History   Problem Relation Age of Onset   • No Known Problems Mother    • No Known Problems Father        Review of Systems   Constitutional: Positive for chills, diaphoresis and fatigue. Negative for fever.   Eyes: Negative.  Negative for visual disturbance (glasses, no change).   Respiratory: Positive for chest tightness, shortness of breath (daily with and without activity) and wheezing. Negative for apnea and cough.         Finishing Prednisone today, bronchitis   Cardiovascular: Positive for chest pain (occas pressure) and palpitations (frequent PVCs). Negative for leg swelling.   Gastrointestinal: Positive for diarrhea and nausea. Negative for abdominal pain, blood in stool, constipation and vomiting.   Endocrine: Negative.    Genitourinary: Negative.  Negative for hematuria.   Musculoskeletal: Positive for arthralgias, back pain, myalgias and neck pain.   Skin: Negative.    Allergic/Immunologic: Negative.    Neurological: Positive  "for dizziness (vertigo with near syncope), light-headedness and headaches (migraines). Negative for syncope, weakness and numbness.   Hematological: Bruises/bleeds easily.   Psychiatric/Behavioral: Positive for sleep disturbance (vertigo, has fallen out of bed).       Objective   Vitals:    10/05/22 1056   BP: 105/64   BP Location: Left arm   Patient Position: Sitting   Pulse: 73   SpO2: 98%   Weight: 105 kg (232 lb 3.2 oz)   Height: 190.5 cm (75\")      /64 (BP Location: Left arm, Patient Position: Sitting)   Pulse 73   Ht 190.5 cm (75\")   Wt 105 kg (232 lb 3.2 oz)   SpO2 98%   BMI 29.02 kg/m²    Lab Results (most recent)     None        Physical Exam  Vitals and nursing note reviewed.   Constitutional:       General: He is not in acute distress.     Appearance: He is well-developed.   HENT:      Head: Normocephalic and atraumatic.   Eyes:      Conjunctiva/sclera: Conjunctivae normal.      Pupils: Pupils are equal, round, and reactive to light.   Neck:      Vascular: No JVD.      Trachea: No tracheal deviation.   Cardiovascular:      Rate and Rhythm: Normal rate and regular rhythm.  Extrasystoles are present.     Heart sounds: Normal heart sounds.   Pulmonary:      Effort: Pulmonary effort is normal.      Breath sounds: Normal breath sounds.   Abdominal:      General: Bowel sounds are normal. There is no distension.      Palpations: Abdomen is soft. There is no mass.      Tenderness: There is no abdominal tenderness. There is no guarding or rebound.   Musculoskeletal:         General: No tenderness or deformity. Normal range of motion.      Cervical back: Normal range of motion and neck supple.   Skin:     General: Skin is warm and dry.      Coloration: Skin is not pale.      Findings: No erythema or rash.   Neurological:      Mental Status: He is alert and oriented to person, place, and time.   Psychiatric:         Behavior: Behavior normal.         Thought Content: Thought content normal.         " Judgment: Judgment normal.           Procedure     ECG 12 Lead    Date/Time: 10/5/2022 11:07 AM  Performed by: Solo Jackson PA  Authorized by: Solo Jackson PA   Comparison: compared with previous ECG from 3/21/2022  Comparison to previous ECG: Atrial pacing, ventricular sensing, frequent PVCs, no acute changes noted.                 Assessment & Plan      Diagnosis Plan   1. Frequent PVCs  Basic Metabolic Panel    TSH    Magnesium    CBC & Differential    Adult Transthoracic Echo Complete W/ Cont if Necessary Per Protocol    Stress Test With Myocardial Perfusion One Day   2. Essential hypertension  Basic Metabolic Panel    TSH    Magnesium    CBC & Differential    Adult Transthoracic Echo Complete W/ Cont if Necessary Per Protocol    Stress Test With Myocardial Perfusion One Day   3. Chronic fatigue  Basic Metabolic Panel    TSH    Magnesium    CBC & Differential    Adult Transthoracic Echo Complete W/ Cont if Necessary Per Protocol    Stress Test With Myocardial Perfusion One Day   4. Shortness of breath  Basic Metabolic Panel    TSH    Magnesium    CBC & Differential    Adult Transthoracic Echo Complete W/ Cont if Necessary Per Protocol    Stress Test With Myocardial Perfusion One Day   5. Dilated cardiomyopathy (HCC)  Basic Metabolic Panel    TSH    Magnesium    CBC & Differential    Adult Transthoracic Echo Complete W/ Cont if Necessary Per Protocol    Stress Test With Myocardial Perfusion One Day   6. Precordial pain  Stress Test With Myocardial Perfusion One Day     1.  The patient is concerned with increasing palpitations.  His palpitations are described as severe.  This correlates well to PVCs by EKG today and by recent interrogation of his device.  He has been very compliant with sotalol therapy and beta-blocker therapy otherwise.  He is concerned and would like to ultimately discuss ongoing symptoms with his electrophysiologist.  We have sent a request for follow-up appointment with that  service.    2.  With ongoing ventricular ectopy and severity of symptoms otherwise, I feel he needs repeat evaluation.  He will be scheduled accordingly.  I would schedule for nuclear stress test for ischemia assessment.    3.  We will schedule for an echo as well to evaluate systolic function and cardiac structure otherwise.    4.  I would like to schedule for laboratory evaluation all as above.    5.  I will continue medications for now without change.  We will await EP recommendations and follow along in that regard.  Further pending above.           Bill Umana  reports that he has never smoked. He has never used smokeless tobacco..                Electronically signed by:

## 2022-10-07 ENCOUNTER — TELEPHONE (OUTPATIENT)
Dept: CARDIOLOGY | Facility: CLINIC | Age: 57
End: 2022-10-07

## 2022-10-07 NOTE — TELEPHONE ENCOUNTER
----- Message from ROSEANNE Goldstein sent at 10/7/2022  9:03 AM EDT -----  Forward to PCP otherwise routine follow-up and follow-up as recommended.    Left detailed message for patient with also date and time of appointment with Dr Downs. Results faxed to PCP. Evelin Zhou MA

## 2022-10-14 DIAGNOSIS — R06.02 SHORTNESS OF BREATH: ICD-10-CM

## 2022-10-14 DIAGNOSIS — K21.9 GASTROESOPHAGEAL REFLUX DISEASE, UNSPECIFIED WHETHER ESOPHAGITIS PRESENT: ICD-10-CM

## 2022-10-14 DIAGNOSIS — R60.9 EDEMA, UNSPECIFIED TYPE: ICD-10-CM

## 2022-10-14 DIAGNOSIS — I42.9 CARDIOMYOPATHY, UNSPECIFIED TYPE: ICD-10-CM

## 2022-10-17 RX ORDER — RAMIPRIL 2.5 MG/1
CAPSULE ORAL
Qty: 90 CAPSULE | Refills: 3 | Status: SHIPPED | OUTPATIENT
Start: 2022-10-17

## 2022-10-17 RX ORDER — POTASSIUM CHLORIDE 750 MG/1
TABLET, EXTENDED RELEASE ORAL
Qty: 90 TABLET | Refills: 3 | Status: SHIPPED | OUTPATIENT
Start: 2022-10-17

## 2022-10-17 RX ORDER — PANTOPRAZOLE SODIUM 40 MG/1
TABLET, DELAYED RELEASE ORAL
Qty: 90 TABLET | Refills: 1 | Status: SHIPPED | OUTPATIENT
Start: 2022-10-17

## 2022-10-17 RX ORDER — FUROSEMIDE 40 MG/1
TABLET ORAL
Qty: 90 TABLET | Refills: 3 | Status: SHIPPED | OUTPATIENT
Start: 2022-10-17

## 2022-10-18 RX ORDER — CARVEDILOL 6.25 MG/1
6.25 TABLET ORAL 2 TIMES DAILY
Qty: 180 TABLET | Refills: 3 | Status: SHIPPED | OUTPATIENT
Start: 2022-10-18 | End: 2023-03-24 | Stop reason: ALTCHOICE

## 2022-10-24 ENCOUNTER — OFFICE VISIT (OUTPATIENT)
Dept: CARDIOLOGY | Facility: CLINIC | Age: 57
End: 2022-10-24

## 2022-10-24 VITALS
DIASTOLIC BLOOD PRESSURE: 72 MMHG | SYSTOLIC BLOOD PRESSURE: 106 MMHG | BODY MASS INDEX: 28.75 KG/M2 | HEIGHT: 75 IN | OXYGEN SATURATION: 97 % | HEART RATE: 61 BPM | WEIGHT: 231.2 LBS

## 2022-10-24 DIAGNOSIS — I42.0 CARDIOMYOPATHY, DILATED: ICD-10-CM

## 2022-10-24 DIAGNOSIS — G47.33 OSA (OBSTRUCTIVE SLEEP APNEA): Primary | ICD-10-CM

## 2022-10-24 PROCEDURE — 93000 ELECTROCARDIOGRAM COMPLETE: CPT | Performed by: PHYSICIAN ASSISTANT

## 2022-10-24 PROCEDURE — 93283 PRGRMG EVAL IMPLANTABLE DFB: CPT | Performed by: PHYSICIAN ASSISTANT

## 2022-10-24 PROCEDURE — 99214 OFFICE O/P EST MOD 30 MIN: CPT | Performed by: PHYSICIAN ASSISTANT

## 2022-10-24 RX ORDER — FLUOXETINE HYDROCHLORIDE 40 MG/1
40 CAPSULE ORAL DAILY
COMMUNITY

## 2022-10-24 RX ORDER — FLUOXETINE HYDROCHLORIDE 20 MG/1
20 CAPSULE ORAL DAILY
COMMUNITY

## 2022-10-24 NOTE — PROGRESS NOTES
Patient ID: Bill Umana is a 57 y.o. male.    Kim Goff, RAFY    Chief Complaint: Paroxysmal atrial fibrillation (HCC)      PROBLEM LIST:  Patient Active Problem List    Diagnosis Date Noted   • Bilateral carotid artery stenosis 11/29/2021     Note Last Updated: 11/29/2021     · 16 to 49% bilateral internal carotid artery stenosis by duplex November 2019     • SDH (subdural hematoma) 11/29/2021     Note Last Updated: 11/29/2021     · [facility] 7-23-21     • Chronic fatigue 12/04/2019   • Orthostatic hypotension 02/13/2019   • Palpitations 01/28/2019   • Nonsustained ventricular tachycardia (HCC) 03/23/2018     Note Last Updated: 11/29/2021     · interrogation demonstrates 4 episodes of nonsustained ventricular tachycardia and one episode of ventricular tachycardia converted with ATP without defibrillation  · patient on Sotalol for ventricular tachycardia followed by EP services     • AICD (automatic cardioverter/defibrillator) present 03/05/2018     Note Last Updated: 11/29/2021     S/P AICD implant ([employer])      • Paroxysmal atrial fibrillation (HCC) 03/05/2018     Note Last Updated: 3/21/2022     · LAAO 12-16-21  · JORGE L, 2/9/2022: 24 mm watchman left atrial appendage occlusion device in place.  Device is well-seated with no peridevice flow.  No device associated thrombus.     • Hyperlipidemia 03/05/2018   • Essential hypertension 03/05/2018   • Systolic congestive heart failure (HCC) 02/19/2018   • Nonischemic dilated cardiomyopathy (HCC) 02/19/2018     Note Last Updated: 3/21/2022     · S/P AICD implant ([employer])   · Cardiac catheterization March 2018 with normal coronaries and an EF estimated at 40-45%  · EF by echocardiogram November 2019 at 35 to 40% with grade 1 diastolic dysfunction  · Echocardiogram, 7/2/2021: Mild concentric LVH.  Moderate global hypokinesis.  EF 35-40%.  Diastolic dysfunction.  Left atrium moderately dilated.  Mild aortic valve sclerosis without  "stenosis.  · JORGE L, 2/9/2022:  EF 26 to 30%.  LV systolic function severely decreased.                 History of Present Illness  Patient presents today for follow-up with a history of cardiomyopathy status post ICD, chronic systolic heart failure, atrial fibrillation, SVT and orthostasis.  Since the last time he was seen in our office he had a transesophageal echocardiogram for follow-up after left atrial appendage occlusion.  The exam showed the device was well-seated with no peridevice flow and no associated thrombus.  He returns still complaining of frequent symptomatic palpitations which may last \"all day\".  He states his orthostasis is stable on current treatment.  He has no current complaint of exertional chest pain.  His breathing is \"okay.  He has no current orthopnea PND or lower extremity edema.  His blood pressures at home have been running about 120 mmHg systolic.  States compliance current med regimen reports no significant adverse side effects    No Known Allergies    Current Outpatient Medications   Medication Instructions   • aspirin 81 mg, Oral, Daily   • carvedilol (COREG) 6.25 mg, Oral, 2 Times Daily   • clopidogrel (PLAVIX) 75 mg, Oral, Daily   • dicyclomine (BENTYL) 10 mg, Oral, Daily   • fludrocortisone 0.1 mg, Oral, Daily   • FLUOXETINE HCL PO 60 mg, Oral, Daily   • furosemide (LASIX) 40 mg, Oral, Daily   • gabapentin (NEURONTIN) 300 MG capsule 2 Times Daily - RT   • ibuprofen (ADVIL,MOTRIN) 800 mg, Oral, Every 6 Hours PRN, prn   • linaclotide (LINZESS) 72 mcg, Oral, Every Morning Before Breakfast   • methocarbamol (ROBAXIN) 750 mg, Oral, 3 Times Daily   • nitroglycerin (NITROSTAT) 0.4 MG SL tablet 1 under the tongue as needed for angina, may repeat q5mins for up three doses   • pantoprazole (PROTONIX) 40 MG EC tablet TAKE 1 TABLET EVERY DAY   • potassium chloride 10 MEQ CR tablet 10 mEq, Oral, Daily   • ramipril (ALTACE) 2.5 MG capsule TAKE 1 CAPSULE BY MOUTH EVERY DAY   • Rimegepant Sulfate " "(Nurtec) 75 MG tablet dispersible tablet Oral, As Needed   • simvastatin (ZOCOR) 20 MG tablet TAKE 1 TABLET EVERY NIGHT   • sotalol (BETAPACE) 120 mg, Oral, 2 Times Daily   • tamsulosin (FLOMAX) 0.4 MG capsule 24 hr capsule 1 capsule, Oral, Daily   • traMADol (ULTRAM) 50 MG tablet 1 tablet, Oral, 2 Times Daily PRN   • traZODone (DESYREL) 100 mg, Oral, Nightly       .    Objective:     /72 (BP Location: Right arm, Patient Position: Sitting)   Pulse 61   Ht 190.5 cm (75\")   Wt 105 kg (231 lb 3.2 oz)   SpO2 97%   BMI 28.90 kg/m²    Body mass index is 28.9 kg/m².     Vitals reviewed.   Constitutional:       Appearance: Well-developed.   Pulmonary:      Effort: Pulmonary effort is normal. No respiratory distress.      Breath sounds: Normal breath sounds. No wheezing. No rales.      Comments: Bases clear  Chest:      Chest wall: Not tender to palpatation.   Cardiovascular:      Normal rate. Regular rhythm.      Murmurs: There is no murmur.      No gallop. No click. No rub.   Pulses:     Intact distal pulses.   Edema:     Peripheral edema absent.   Musculoskeletal: Normal range of motion.       Lab Review:                 TSH    TSH 10/5/22   TSH 1.210                     ECG 12 Lead    Date/Time: 10/24/2022 10:27 AM  Performed by: Cosme Vernon PA  Authorized by: Cosme Vernon PA   Rhythm: sinus rhythm and paced  Rate: normal  Conduction: conduction normal  ST Segments: ST segments normal    Clinical impression: normal ECG                       Assessment:      Diagnosis Plan   1. Paroxysmal atrial fibrillation (HCC)  ECG 12 Lead today shows sinus rhythm with stable QT QTC.  Continue sotalol.  He has a well-seated left atrial appendage occlusion device.   2. Nonischemic dilated cardiomyopathy (HCC)   stable nonischemic cardiomyopathy with well managed chronic systolic heart failure NYHA class II.  Normal functioning ICD   3. AICD (automatic cardioverter/defibrillator) present  .  This patient's " Cardiac Implanted Electronic Device was manually interrogated and reprogrammed during the patient encounter today.  Iterative programming changes were manually made to determine the sensing threshold, pacing threshold, lead impedance as well as underlying cardiac rhythm.  These programming changes were not limited to but included some or all of the following when appropriate: pacing mode, programmed AV delays, blanking periods, and refractory periods.  Data obtained as a result of these manual programing changes informed the patient's CIED permanent programming.       4. Palpitations   device interrogation shows 2 one-to-one episodes both less than 8 seconds appearing to be atrial tachycardia.  Single mode switch less than 10 seconds.  Significantly reduced number of PVCs compared with previous interrogation   5. Orthostatic hypotension   stable and well managed with current medical regimen     Plan:

## 2022-10-24 NOTE — PROGRESS NOTES
Encounter Date:03/21/2022      Patient ID: Bill Umana is a 57 y.o. male.    Kim Goff, RAFY    Chief Complaint: Paroxysmal atrial fibrillation (HCC)      PROBLEM LIST:  Patient Active Problem List    Diagnosis Date Noted   • Bilateral carotid artery stenosis 11/29/2021     Note Last Updated: 11/29/2021     · 16 to 49% bilateral internal carotid artery stenosis by duplex November 2019     • SDH (subdural hematoma) 11/29/2021     Note Last Updated: 11/29/2021     · [facility] 7-23-21     • Chronic fatigue 12/04/2019   • Orthostatic hypotension 02/13/2019   • Palpitations 01/28/2019   • Nonsustained ventricular tachycardia (HCC) 03/23/2018     Note Last Updated: 11/29/2021     · interrogation demonstrates 4 episodes of nonsustained ventricular tachycardia and one episode of ventricular tachycardia converted with ATP without defibrillation  · patient on Sotalol for ventricular tachycardia followed by EP services     • AICD (automatic cardioverter/defibrillator) present 03/05/2018     Note Last Updated: 11/29/2021     S/P AICD implant ([employer])      • Paroxysmal atrial fibrillation (HCC) 03/05/2018     Note Last Updated: 3/21/2022     · LAAO 12-16-21  · JORGE L, 2/9/2022: 24 mm watchman left atrial appendage occlusion device in place.  Device is well-seated with no peridevice flow.  No device associated thrombus.     • Hyperlipidemia 03/05/2018   • Essential hypertension 03/05/2018   • Systolic congestive heart failure (HCC) 02/19/2018   • Nonischemic dilated cardiomyopathy (HCC) 02/19/2018     Note Last Updated: 3/21/2022     · S/P AICD implant ([employer])   · Cardiac catheterization March 2018 with normal coronaries and an EF estimated at 40-45%  · EF by echocardiogram November 2019 at 35 to 40% with grade 1 diastolic dysfunction  · Echocardiogram, 7/2/2021: Mild concentric LVH.  Moderate global hypokinesis.  EF 35-40%.  Diastolic dysfunction.  Left atrium moderately dilated.  Mild aortic  valve sclerosis without stenosis.  · JORGE L, 2/9/2022:  EF 26 to 30%.  LV systolic function severely decreased.                 History of Present Illness  Patient presents today for follow-up with a history of cardiomyopathy status post ICD, chronic systolic heart failure, atrial fibrillation, SVT and ortho stasis.  Since he was seen by our office he is states that recently has had more palpitations than usual.  These occur on a regular basis skipping fluttering sensation symptoms that wake him up from night.  He also states to have some palpitations Teresa cause chest pain.  He has seen his local cardiologist is undergoing a stress test and echocardiogram.  He denies any ICD shocks dizziness near syncope or syncope.  Overall otherwise he states he is feeling good on his current medical therapy.  He would like to know if there is any further treatment options for palpitations.  The other thing that he mentions that he is tired all the time his wife complains that he snores.  He thinks he may have sleep apnea.  No Known Allergies    Current Outpatient Medications   Medication Instructions   • aspirin 81 mg, Oral, Daily   • carvedilol (COREG) 6.25 mg, Oral, 2 Times Daily   • clopidogrel (PLAVIX) 75 mg, Oral, Daily   • dicyclomine (BENTYL) 10 mg, Oral, Daily   • fludrocortisone 0.1 mg, Oral, Daily   • FLUOXETINE HCL PO 60 mg, Oral, Daily   • furosemide (LASIX) 40 mg, Oral, Daily   • gabapentin (NEURONTIN) 300 MG capsule 2 Times Daily - RT   • ibuprofen (ADVIL,MOTRIN) 800 mg, Oral, Every 6 Hours PRN, prn   • linaclotide (LINZESS) 72 mcg, Oral, Every Morning Before Breakfast   • methocarbamol (ROBAXIN) 750 mg, Oral, 3 Times Daily   • nitroglycerin (NITROSTAT) 0.4 MG SL tablet 1 under the tongue as needed for angina, may repeat q5mins for up three doses   • pantoprazole (PROTONIX) 40 MG EC tablet TAKE 1 TABLET EVERY DAY   • potassium chloride 10 MEQ CR tablet 10 mEq, Oral, Daily   • ramipril (ALTACE) 2.5 MG capsule TAKE 1  "CAPSULE BY MOUTH EVERY DAY   • Rimegepant Sulfate (Nurtec) 75 MG tablet dispersible tablet Oral, As Needed   • simvastatin (ZOCOR) 20 MG tablet TAKE 1 TABLET EVERY NIGHT   • sotalol (BETAPACE) 120 mg, Oral, 2 Times Daily   • tamsulosin (FLOMAX) 0.4 MG capsule 24 hr capsule 1 capsule, Oral, Daily   • traMADol (ULTRAM) 50 MG tablet 1 tablet, Oral, 2 Times Daily PRN   • traZODone (DESYREL) 100 mg, Oral, Nightly       .    Objective:     /72 (BP Location: Right arm, Patient Position: Sitting)   Pulse 61   Ht 190.5 cm (75\")   Wt 105 kg (231 lb 3.2 oz)   SpO2 97%   BMI 28.90 kg/m²    Body mass index is 28.9 kg/m².     Vitals reviewed.   Constitutional:       Appearance: Well-developed.   Pulmonary:      Effort: Pulmonary effort is normal. No respiratory distress.      Breath sounds: Normal breath sounds. No wheezing. No rales.      Comments: Bases clear  Chest:      Chest wall: Not tender to palpatation.   Cardiovascular:      Normal rate. Regular rhythm.      Murmurs: There is no murmur.      No gallop. No click. No rub.   Pulses:     Intact distal pulses.   Edema:     Peripheral edema absent.   Musculoskeletal: Normal range of motion.       Lab Review:                 TSH    TSH 10/5/22   TSH 1.210                     ECG 12 Lead    Date/Time: 10/24/2022 10:40 AM  Performed by: Cosme Vernon PA  Authorized by: Cosme Vernon PA   Rhythm: sinus rhythm  Rate: normal  Conduction: conduction normal  QRS axis: normal    Clinical impression: normal ECG             Device interrogation: [employer] ICD DDD at 60 a paced 30% RV paced 1%.  Normal P wave R wave thresholds and impedance.  Battery voltage 1.5 years remaining.  No mode switches.  1 episode of NSVT.          Assessment:      Diagnosis Plan   1. Paroxysmal atrial fibrillation (HCC)  ECG 12 Lead today shows sinus rhythm with stable QT QTC.  Continue sotalol.  He has a well-seated left atrial appendage occlusion device.   2. Nonischemic " dilated cardiomyopathy (HCC)   stable nonischemic cardiomyopathy with well managed chronic systolic heart failure NYHA class II.  Normal functioning ICD   3. AICD (automatic cardioverter/defibrillator) present  .  This patient's Cardiac Implanted Electronic Device was manually interrogated and reprogrammed during the patient encounter today.  Iterative programming changes were manually made to determine the sensing threshold, pacing threshold, lead impedance as well as underlying cardiac rhythm.  These programming changes were not limited to but included some or all of the following when appropriate: pacing mode, programmed AV delays, blanking periods, and refractory periods.  Data obtained as a result of these manual programing changes informed the patient's CIED permanent programming.       4. Palpitations   no significant mode switches or evidence of significant arrhythmias.   5. Orthostatic hypotension   stable and well managed with current medical regimen     Plan:   7-day monitor, regarding palpitations see if any arrhythmias correlate with his symptoms.  EKG today stable normal sinus rhythm QTC acceptable.  Remains on sotalol 120 mg twice daily.  Patient has symptoms sleep apnea we will pursue a sleep study  He would like to return follow-up in [place] in about 3 months.    Electronically signed by ROSEANNE Villar, 10/24/22, 10:40 AM EDT.

## 2022-11-15 ENCOUNTER — HOSPITAL ENCOUNTER (OUTPATIENT)
Dept: CARDIOLOGY | Facility: HOSPITAL | Age: 57
Discharge: HOME | End: 2022-11-15

## 2022-11-15 ENCOUNTER — TELEPHONE (OUTPATIENT)
Dept: CARDIOLOGY | Facility: CLINIC | Age: 57
End: 2022-11-15

## 2022-11-15 DIAGNOSIS — I10 ESSENTIAL HYPERTENSION: ICD-10-CM

## 2022-11-15 DIAGNOSIS — I49.3 FREQUENT PVCS: ICD-10-CM

## 2022-11-15 DIAGNOSIS — I42.0 DILATED CARDIOMYOPATHY: ICD-10-CM

## 2022-11-15 DIAGNOSIS — R07.2 PRECORDIAL PAIN: ICD-10-CM

## 2022-11-15 DIAGNOSIS — R06.02 SHORTNESS OF BREATH: ICD-10-CM

## 2022-11-15 DIAGNOSIS — R53.82 CHRONIC FATIGUE: ICD-10-CM

## 2022-11-15 LAB
BH CV REST NUCLEAR ISOTOPE DOSE: 10 MCI
BH CV STRESS COMMENTS STAGE 1: NORMAL
BH CV STRESS DOSE REGADENOSON STAGE 1: 0.4
BH CV STRESS DURATION MIN STAGE 1: 0
BH CV STRESS DURATION SEC STAGE 1: 10
BH CV STRESS NUCLEAR ISOTOPE DOSE: 30 MCI
BH CV STRESS PROTOCOL 1: NORMAL
BH CV STRESS RECOVERY HR: 76 BPM
BH CV STRESS STAGE 1: 1
MAXIMAL PREDICTED HEART RATE: 162 BPM
PERCENT MAX PREDICTED HR: 46.91 %
STRESS BASELINE BP: NORMAL MMHG
STRESS BASELINE HR: 82 BPM
STRESS PERCENT HR: 55 %
STRESS POST PEAK BP: NORMAL MMHG
STRESS POST PEAK HR: 76 BPM
STRESS TARGET HR: 138 BPM

## 2022-11-15 PROCEDURE — A9500 TC99M SESTAMIBI: HCPCS | Performed by: INTERNAL MEDICINE

## 2022-11-15 PROCEDURE — 93306 TTE W/DOPPLER COMPLETE: CPT

## 2022-11-15 PROCEDURE — 93017 CV STRESS TEST TRACING ONLY: CPT

## 2022-11-15 PROCEDURE — 93018 CV STRESS TEST I&R ONLY: CPT | Performed by: INTERNAL MEDICINE

## 2022-11-15 PROCEDURE — 0 TECHNETIUM SESTAMIBI: Performed by: INTERNAL MEDICINE

## 2022-11-15 PROCEDURE — 25010000002 REGADENOSON 0.4 MG/5ML SOLUTION: Performed by: PHYSICIAN ASSISTANT

## 2022-11-15 PROCEDURE — 78452 HT MUSCLE IMAGE SPECT MULT: CPT | Performed by: INTERNAL MEDICINE

## 2022-11-15 PROCEDURE — 93306 TTE W/DOPPLER COMPLETE: CPT | Performed by: INTERNAL MEDICINE

## 2022-11-15 PROCEDURE — 78452 HT MUSCLE IMAGE SPECT MULT: CPT

## 2022-11-15 RX ADMIN — TECHNETIUM TC 99M SESTAMIBI 1 DOSE: 1 INJECTION INTRAVENOUS at 08:11

## 2022-11-15 RX ADMIN — REGADENOSON 0.4 MG: 0.08 INJECTION, SOLUTION INTRAVENOUS at 10:18

## 2022-11-15 NOTE — TELEPHONE ENCOUNTER
Received cardiac clearance request from  stating pt has C4-5 ACDF to be scheduled and is requiring a cardiac clearance. Placed cardiac clearance request in Mee's inbox to review and address with provider.

## 2022-11-21 ENCOUNTER — TELEPHONE (OUTPATIENT)
Dept: CARDIOLOGY | Facility: CLINIC | Age: 57
End: 2022-11-21

## 2022-11-21 ENCOUNTER — HOSPITAL ENCOUNTER (OUTPATIENT)
Dept: CARDIOLOGY | Facility: HOSPITAL | Age: 57
Discharge: HOME | End: 2022-11-21
Admitting: PHYSICIAN ASSISTANT

## 2022-11-21 ENCOUNTER — CLINICAL SUPPORT (OUTPATIENT)
Dept: CARDIOLOGY | Facility: CLINIC | Age: 57
End: 2022-11-21

## 2022-11-21 ENCOUNTER — TELEPHONE (OUTPATIENT)
Dept: GENERAL RADIOLOGY | Facility: HOSPITAL | Age: 57
End: 2022-11-21

## 2022-11-21 VITALS
SYSTOLIC BLOOD PRESSURE: 108 MMHG | HEART RATE: 42 BPM | BODY MASS INDEX: 28.35 KG/M2 | HEIGHT: 75 IN | OXYGEN SATURATION: 96 % | DIASTOLIC BLOOD PRESSURE: 70 MMHG | WEIGHT: 228 LBS

## 2022-11-21 DIAGNOSIS — M79.89 REDNESS AND SWELLING OF UPPER ARM: ICD-10-CM

## 2022-11-21 DIAGNOSIS — R60.0 EDEMA OF RIGHT UPPER ARM: ICD-10-CM

## 2022-11-21 DIAGNOSIS — M79.601 RIGHT ARM PAIN: ICD-10-CM

## 2022-11-21 DIAGNOSIS — R23.8 REDNESS AND SWELLING OF UPPER ARM: ICD-10-CM

## 2022-11-21 DIAGNOSIS — M79.601 RIGHT ARM PAIN: Primary | ICD-10-CM

## 2022-11-21 DIAGNOSIS — I82.90 VENOUS THROMBOSIS: ICD-10-CM

## 2022-11-21 PROCEDURE — 93000 ELECTROCARDIOGRAM COMPLETE: CPT | Performed by: PHYSICIAN ASSISTANT

## 2022-11-21 PROCEDURE — 99211 OFF/OP EST MAY X REQ PHY/QHP: CPT | Performed by: PHYSICIAN ASSISTANT

## 2022-11-21 PROCEDURE — 93971 EXTREMITY STUDY: CPT

## 2022-11-21 PROCEDURE — 93971 EXTREMITY STUDY: CPT | Performed by: RADIOLOGY

## 2022-11-21 NOTE — PROGRESS NOTES
"Bill Umana  1965 11/21/2022   ?   Chief Complaint   Patient presents with   • Nurse visit   • Chest Pain     X 2 days   • Right arm pain     Redness, pain right upper arm, started yesterday, warm to touch      ?   HPI:   ?   ?Patient presents to office reporting he has chronic chest pain, but the pain has felt different the past two days. Reports he has not taken any nitro. Patient also reports pain in right upper arm, \"knot\"  X two days. Area is hot to touch, red.    ?     Current Outpatient Medications:   •  aspirin 81 MG tablet, Take 1 tablet by mouth Daily., Disp: 30 tablet, Rfl: 11  •  carvedilol (COREG) 6.25 MG tablet, Take 1 tablet by mouth 2 (Two) Times a Day., Disp: 180 tablet, Rfl: 3  •  clopidogrel (PLAVIX) 75 MG tablet, TAKE 1 TABLET BY MOUTH EVERY DAY, Disp: 90 tablet, Rfl: 2  •  dicyclomine (BENTYL) 10 MG capsule, Take 1 capsule by mouth Daily. (Patient taking differently: Take 10 mg by mouth 3 (Three) Times a Day.), Disp: 30 capsule, Rfl: 11  •  fludrocortisone 0.1 MG tablet, Take 1 tablet by mouth Daily., Disp: 90 tablet, Rfl: 3  •  FLUoxetine (PROzac) 20 MG capsule, Take 1 capsule by mouth Daily., Disp: , Rfl:   •  FLUoxetine (PROzac) 40 MG capsule, Take 1 capsule by mouth Daily., Disp: , Rfl:   •  furosemide (LASIX) 40 MG tablet, TAKE 1 TABLET EVERY DAY, Disp: 90 tablet, Rfl: 3  •  gabapentin (NEURONTIN) 300 MG capsule, 2 (Two) Times a Day., Disp: , Rfl: 2  •  ibuprofen (ADVIL,MOTRIN) 800 MG tablet, Take 800 mg by mouth Every 6 (Six) Hours As Needed for Moderate Pain . prn, Disp: , Rfl:   •  linaclotide (LINZESS) 72 MCG capsule capsule, Take 72 mcg by mouth Every Morning Before Breakfast., Disp: , Rfl:   •  nitroglycerin (NITROSTAT) 0.4 MG SL tablet, 1 under the tongue as needed for angina, may repeat q5mins for up three doses (Patient taking differently: Place 0.4 mg under the tongue Every 5 (Five) Minutes As Needed for Chest Pain. 1 under the tongue as needed for angina, may repeat " q5mins for up three doses), Disp: 100 tablet, Rfl: 11  •  pantoprazole (PROTONIX) 40 MG EC tablet, TAKE 1 TABLET EVERY DAY, Disp: 90 tablet, Rfl: 1  •  potassium chloride (K-DUR,KLOR-CON) 10 MEQ CR tablet, TAKE 1 TABLET EVERY DAY, Disp: 90 tablet, Rfl: 3  •  ramipril (ALTACE) 2.5 MG capsule, TAKE 1 CAPSULE EVERY DAY, Disp: 90 capsule, Rfl: 3  •  Rimegepant Sulfate (Nurtec) 75 MG tablet dispersible tablet, Take  by mouth As Needed (MIGRAINE)., Disp: , Rfl:   •  simvastatin (ZOCOR) 20 MG tablet, TAKE 1 TABLET EVERY NIGHT, Disp: 90 tablet, Rfl: 3  •  sotalol (BETAPACE) 120 MG tablet, Take 1 tablet by mouth 2 (Two) Times a Day., Disp: 180 tablet, Rfl: 3  •  tamsulosin (FLOMAX) 0.4 MG capsule 24 hr capsule, Take 1 capsule by mouth Daily., Disp: , Rfl: 1  •  traMADol (ULTRAM) 50 MG tablet, Take 1 tablet by mouth 2 (Two) Times a Day As Needed., Disp: , Rfl:   •  traZODone (DESYREL) 100 MG tablet, Take 100 mg by mouth Every Night., Disp: , Rfl:    ?   ?   Patient has no known allergies.         ECG 12 Lead    Date/Time: 11/21/2022 10:52 AM  Performed by: Solo Jackson PA  Authorized by: Solo Jackson PA   Comparison: compared with previous ECG from 10/24/2022               ?   Assessment & Plan    ?   ? 1. Chest pain        2. Right upper arm pain, redness, warmth, edema    EKG reviewed by Solo CRONIN, no changes in EKG. Patient to have venous ultrasound on right arm downstairs today regarding pain, redness, edema. Patient instructed to go to ER if chest pain persists for further testing. Patient verbalized understanding. Mitali HAYNES    ?

## 2022-11-21 NOTE — TELEPHONE ENCOUNTER
STRESS  Pt notified of no acute findings. Provider will discuss results at f/u. Pt reminded of appt date and time.  ----- Message from Mee Marcial MA sent at 11/18/2022 11:57 AM EST -----    ----- Message -----  From: Solo Jackson PA  Sent: 11/18/2022   9:03 AM EST  To: Mee Marcial MA    No evidence of ischemia.  ----- Message -----  From: Martinez Shcwarz MD  Sent: 11/15/2022   9:44 PM EST  To: ROSEANNE Goldstein

## 2022-11-21 NOTE — TELEPHONE ENCOUNTER
Med: MULTAQ  Dosage: 400MG  Sig:  TAKE 1 TABLET BY MOUTH 2 TIMES DAILY  Quantity requested:  60      Mail Order: no    Preferred pharmacy has been set up and verified.   Verbal US report given to Jewels @ ordering providers office...report also faxed.

## 2022-11-21 NOTE — TELEPHONE ENCOUNTER
I received a call from [facility] making sure Solo Jackson was aware of patients US results. I sent the provider as well as his nurse an Urgent message regarding this.

## 2022-11-22 ENCOUNTER — TELEPHONE (OUTPATIENT)
Dept: CARDIOLOGY | Facility: CLINIC | Age: 57
End: 2022-11-22

## 2022-11-22 DIAGNOSIS — I82.90 VENOUS THROMBOSIS: Primary | ICD-10-CM

## 2022-11-22 DIAGNOSIS — L53.9 ERYTHEMA: ICD-10-CM

## 2022-11-22 DIAGNOSIS — M79.602 PAIN OF LEFT UPPER EXTREMITY: ICD-10-CM

## 2022-11-22 NOTE — TELEPHONE ENCOUNTER
Per Solo Jackson PAC Verbal.  Venous Doppler Us. Please send to [facility] to be scheduled. Order placed.

## 2022-11-22 NOTE — TELEPHONE ENCOUNTER
Per mauricio pollock,pac   PLEASE SEND REFERRAL FOR PATIENT TO SEE HEMATOLOGY WITH . PLEASE ORDER D-DIMER, PATIENT IS GOING TO PCP TO GET DONE TODAY. WILL ORDER CT CHEST WITH PE PROTOCOL IF NEEDED, PATIENT VERBALLY UNDERSTANDS AND IS AWARE. MAURICIO SPOKE WITH PATIENT ON PHONE AND WENT OVER RESULTS IN DETAIL. I FAXED ORDER FOR D-DIMER TO PCP.

## 2022-11-22 NOTE — TELEPHONE ENCOUNTER
----- Message from ROSEANNE Goldstein sent at 11/22/2022  8:53 AM EST -----  See me on this.  ----- Message -----  From: [facility] [place] [facility]  Sent: 11/21/2022   1:24 PM EST  To: ROSEANNE Goldstein

## 2022-11-23 ENCOUNTER — TELEPHONE (OUTPATIENT)
Dept: CARDIOLOGY | Facility: CLINIC | Age: 57
End: 2022-11-23

## 2022-11-23 DIAGNOSIS — R79.89 ELEVATED D-DIMER: Primary | ICD-10-CM

## 2022-11-23 DIAGNOSIS — R07.2 PRECORDIAL PAIN: ICD-10-CM

## 2022-11-23 NOTE — TELEPHONE ENCOUNTER
Solo Jackson PA Luttrell, Nicole, MA  CT PE protocol           Previous Messages     ----- Message -----   From: Janneth Quigley, PCT   Sent: 11/23/2022   3:14 PM EST   To: ROSEANNE Goldstein   Subject: Edit                                             The scan below was edited by Janneth Quigley, PCT [[id number]] on 11/23/2022 at 3:14 PM; it is attached to the following: D-dimer, Quantitative on 11/22/2022.

## 2022-11-23 NOTE — TELEPHONE ENCOUNTER
Spoke to patient notified of elevated d-dimer. Instructed patient to go to er to get CTA chest performed due to scheduling already finished for the holiday. Patient verbalized understanding.

## 2022-11-23 NOTE — TELEPHONE ENCOUNTER
Hematology Referral 11/22:    Mee spoke with Solo regarding patients hematology referral. The soonest Dr. Francis has is 02/08/2023. Per Solo Jackson, this appointment is okay. Patient was informed to go to the ER with any new or worsening symptoms.

## 2022-11-24 LAB
BASOPHILS # BLD AUTO: 0 X10E3/UL (ref 0–0.2)
BASOPHILS NFR BLD AUTO: 0 %
BUN SERPL-MCNC: 11 MG/DL (ref 6–24)
BUN/CREAT SERPL: 11 (ref 9–20)
CALCIUM SERPL-MCNC: 9.5 MG/DL (ref 8.7–10.2)
CHLORIDE SERPL-SCNC: 102 MMOL/L (ref 96–106)
CO2 SERPL-SCNC: 24 MMOL/L (ref 20–29)
CREAT SERPL-MCNC: 1.03 MG/DL (ref 0.76–1.27)
EGFRCR SERPLBLD CKD-EPI 2021: 84 ML/MIN/1.73
EOSINOPHIL # BLD AUTO: 0.1 X10E3/UL (ref 0–0.4)
EOSINOPHIL NFR BLD AUTO: 2 %
ERYTHROCYTE [DISTWIDTH] IN BLOOD BY AUTOMATED COUNT: 13.6 % (ref 11.6–15.4)
GLUCOSE SERPL-MCNC: 96 MG/DL (ref 70–99)
HCT VFR BLD AUTO: 43.8 % (ref 37.5–51)
HGB BLD-MCNC: 15.2 G/DL (ref 13–17.7)
IMM GRANULOCYTES # BLD AUTO: 0.2 X10E3/UL (ref 0–0.1)
IMM GRANULOCYTES NFR BLD AUTO: 2 %
LYMPHOCYTES # BLD AUTO: 2 X10E3/UL (ref 0.7–3.1)
LYMPHOCYTES NFR BLD AUTO: 29 %
MCH RBC QN AUTO: 31 PG (ref 26.6–33)
MCHC RBC AUTO-ENTMCNC: 34.7 G/DL (ref 31.5–35.7)
MCV RBC AUTO: 89 FL (ref 79–97)
MONOCYTES # BLD AUTO: 0.5 X10E3/UL (ref 0.1–0.9)
MONOCYTES NFR BLD AUTO: 8 %
NEUTROPHILS # BLD AUTO: 4.1 X10E3/UL (ref 1.4–7)
NEUTROPHILS NFR BLD AUTO: 59 %
PLATELET # BLD AUTO: 237 X10E3/UL (ref 150–450)
POTASSIUM SERPL-SCNC: 4.4 MMOL/L (ref 3.5–5.2)
RBC # BLD AUTO: 4.9 X10E6/UL (ref 4.14–5.8)
SODIUM SERPL-SCNC: 141 MMOL/L (ref 134–144)
TSH SERPL DL<=0.005 MIU/L-ACNC: 2.61 UIU/ML (ref 0.45–4.5)
WBC # BLD AUTO: 7 X10E3/UL (ref 3.4–10.8)

## 2022-11-30 ENCOUNTER — TELEPHONE (OUTPATIENT)
Dept: CARDIOLOGY | Facility: CLINIC | Age: 57
End: 2022-11-30

## 2022-11-30 NOTE — TELEPHONE ENCOUNTER
Patient called to report testing has shown 2 more blood clots. This has been addressed by another provider. He just wanted to make office aware.

## 2022-12-02 ENCOUNTER — TELEPHONE (OUTPATIENT)
Dept: CARDIOLOGY | Facility: CLINIC | Age: 57
End: 2022-12-02

## 2022-12-02 NOTE — TELEPHONE ENCOUNTER
----- Message from Ravinder Osman Rep sent at 12/2/2022 10:59 AM EST -----    ----- Message -----  From: Mee Marcial MA  Sent: 12/2/2022   9:14 AM EST  To: BJORN Shelby      ----- Message -----  From: Solo Jackson PA  Sent: 12/2/2022   9:08 AM EST  To: Mee Marcial MA    Routine follow-up.  ----- Message -----  From: Mena Gill RegSched Rep  Sent: 11/28/2022   9:27 AM EST  To: ROSEANNE Goldstein

## 2022-12-03 LAB
BH CV ECHO MEAS - ACS: 2.48 CM
BH CV ECHO MEAS - AO MAX PG: 4.1 MMHG
BH CV ECHO MEAS - AO MEAN PG: 2.48 MMHG
BH CV ECHO MEAS - AO ROOT DIAM: 3.5 CM
BH CV ECHO MEAS - AO V2 MAX: 101.8 CM/SEC
BH CV ECHO MEAS - AO V2 VTI: 21.2 CM
BH CV ECHO MEAS - EDV(CUBED): 161.9 ML
BH CV ECHO MEAS - EDV(MOD-SP4): 145 ML
BH CV ECHO MEAS - EF(MOD-SP4): 36.3 %
BH CV ECHO MEAS - EF_3D-VOL: 23 %
BH CV ECHO MEAS - ESV(CUBED): 109.9 ML
BH CV ECHO MEAS - ESV(MOD-SP4): 92.3 ML
BH CV ECHO MEAS - FS: 12.1 %
BH CV ECHO MEAS - IVS/LVPW: 1.09 CM
BH CV ECHO MEAS - IVSD: 1.12 CM
BH CV ECHO MEAS - LA DIMENSION: 4.5 CM
BH CV ECHO MEAS - LAT PEAK E' VEL: 5.5 CM/SEC
BH CV ECHO MEAS - LV DIASTOLIC VOL/BSA (35-75): 62.2 CM2
BH CV ECHO MEAS - LV MASS(C)D: 231.1 GRAMS
BH CV ECHO MEAS - LV SYSTOLIC VOL/BSA (12-30): 39.6 CM2
BH CV ECHO MEAS - LVIDD: 5.5 CM
BH CV ECHO MEAS - LVIDS: 4.8 CM
BH CV ECHO MEAS - LVOT AREA: 3.7 CM2
BH CV ECHO MEAS - LVOT DIAM: 2.17 CM
BH CV ECHO MEAS - LVPWD: 1.03 CM
BH CV ECHO MEAS - MED PEAK E' VEL: 4.5 CM/SEC
BH CV ECHO MEAS - MV A MAX VEL: 55.7 CM/SEC
BH CV ECHO MEAS - MV DEC SLOPE: 190.5 CM/SEC2
BH CV ECHO MEAS - MV E MAX VEL: 45.4 CM/SEC
BH CV ECHO MEAS - MV E/A: 0.82
BH CV ECHO MEAS - RAP SYSTOLE: 10 MMHG
BH CV ECHO MEAS - RVDD: 3.6 CM
BH CV ECHO MEAS - RVSP: 32.1 MMHG
BH CV ECHO MEAS - SI(MOD-SP4): 22.6 ML/M2
BH CV ECHO MEAS - SV(MOD-SP4): 52.7 ML
BH CV ECHO MEAS - TR MAX PG: 22.1 MMHG
BH CV ECHO MEAS - TR MAX VEL: 234.8 CM/SEC
BH CV ECHO MEASUREMENTS AVERAGE E/E' RATIO: 9.08
LEFT ATRIUM VOLUME INDEX: 33.3 ML/M2
MAXIMAL PREDICTED HEART RATE: 162 BPM
STRESS TARGET HR: 138 BPM

## 2022-12-07 ENCOUNTER — TELEPHONE (OUTPATIENT)
Dept: CARDIOLOGY | Facility: CLINIC | Age: 57
End: 2022-12-07

## 2022-12-07 NOTE — TELEPHONE ENCOUNTER
Caller: Bill Umana    Relationship to patient: Self    Best call back number: [phone]    Patient is needing: PT CALLED IN TO GET THE RESULTS OF HIS ECHO, HE ADVISED THAT HE IS STILL HAVING SHORTNESS OF BREATH.     THANK YOU.

## 2022-12-07 NOTE — TELEPHONE ENCOUNTER
----- Message from ROSEANNE Goldstein sent at 12/7/2022  9:03 AM EST -----  Follow-up as scheduled.  I believe that I was told that his thrombophlebitis was addressed?  ----- Message -----  From: Martinez Schwarz MD  Sent: 12/3/2022   9:57 PM EST  To: ROSEANNE Goldstein      Adult Transthoracic Echo Complete W/ Cont if Necessary Per Protocol  Order: [id number]   Status: Final result      Visible to patient: Yes (seen)      Dx: Shortness of breath; Dilated cardiomy...      0 Result Notes  Details    Reading Physician Reading Date Result Priority   Martinez Schwarz MD  [phone] 12/3/2022 Routine     Result Text     •  Left ventricular ejection fraction appears to be 31 - 35%.  •  Left ventricular diastolic function is consistent with (grade I) impaired relaxation.  •  The right atrial cavity is mild to moderately  dilated.  •  Estimated right ventricular systolic pressure from tricuspid regurgitation is normal (<35 mmHg).     Technically adequate study.    1.  LV size is normal and global LV systolic function is severely depressed.  Visually estimated ejection fraction is 25 to 30%.  Borderline increased LV wall thickness with grade 1A diastolic dysfunction.  Moderate left atrial enlargement.  Mild to moderate right atrial enlargement.  RV size and function are grossly preserved.  Patient her defibrillator leads are identified in the right heart.  No septal defect or other intracavitary mass or thrombus.    2.  The mitral valve is morphologically normal with no mitral stenosis with trivial MR.  The aortic valve is grossly normally configured with no aortic stenosis and with trivial to mild AI.  Mild tricuspid regurgitation is identified from a morphologically normal valve.    3.  No pericardial or great vessel pathology.    4.  Pulmonary artery systolic pressures are estimated in the low 30s.

## 2023-01-09 DIAGNOSIS — E78.2 MIXED HYPERLIPIDEMIA: ICD-10-CM

## 2023-01-10 RX ORDER — SIMVASTATIN 20 MG
TABLET ORAL
Qty: 90 TABLET | Refills: 3 | Status: SHIPPED | OUTPATIENT
Start: 2023-01-10

## 2023-01-12 ENCOUNTER — PREP FOR SURGERY (OUTPATIENT)
Dept: OTHER | Facility: HOSPITAL | Age: 58
End: 2023-01-12
Payer: MEDICARE

## 2023-01-12 DIAGNOSIS — Z95.818 PRESENCE OF WATCHMAN LEFT ATRIAL APPENDAGE CLOSURE DEVICE: ICD-10-CM

## 2023-01-12 DIAGNOSIS — I48.0 PAROXYSMAL ATRIAL FIBRILLATION: Primary | ICD-10-CM

## 2023-01-12 RX ORDER — SODIUM CHLORIDE 0.9 % (FLUSH) 0.9 %
10 SYRINGE (ML) INJECTION AS NEEDED
Status: CANCELLED | OUTPATIENT
Start: 2023-01-12

## 2023-01-12 RX ORDER — SODIUM CHLORIDE 9 MG/ML
40 INJECTION, SOLUTION INTRAVENOUS AS NEEDED
Status: CANCELLED | OUTPATIENT
Start: 2023-01-12

## 2023-01-12 RX ORDER — SODIUM CHLORIDE 0.9 % (FLUSH) 0.9 %
10 SYRINGE (ML) INJECTION EVERY 12 HOURS SCHEDULED
Status: CANCELLED | OUTPATIENT
Start: 2023-01-12

## 2023-01-19 ENCOUNTER — APPOINTMENT (OUTPATIENT)
Dept: CARDIOLOGY | Facility: HOSPITAL | Age: 58
End: 2023-01-19
Payer: MEDICARE

## 2023-01-27 ENCOUNTER — OFFICE VISIT (OUTPATIENT)
Dept: CARDIOLOGY | Facility: CLINIC | Age: 58
End: 2023-01-27
Payer: MEDICARE

## 2023-01-27 DIAGNOSIS — I47.29 NONSUSTAINED VENTRICULAR TACHYCARDIA: ICD-10-CM

## 2023-01-27 DIAGNOSIS — Z95.810 AICD (AUTOMATIC CARDIOVERTER/DEFIBRILLATOR) PRESENT: ICD-10-CM

## 2023-01-27 DIAGNOSIS — I42.0 NONISCHEMIC DILATED CARDIOMYOPATHY: ICD-10-CM

## 2023-01-27 PROCEDURE — 93283 PRGRMG EVAL IMPLANTABLE DFB: CPT | Performed by: INTERNAL MEDICINE

## 2023-02-20 DIAGNOSIS — Z95.818 PRESENCE OF WATCHMAN LEFT ATRIAL APPENDAGE CLOSURE DEVICE: ICD-10-CM

## 2023-02-20 DIAGNOSIS — I48.0 PAROXYSMAL ATRIAL FIBRILLATION: Primary | ICD-10-CM

## 2023-03-03 ENCOUNTER — PREP FOR SURGERY (OUTPATIENT)
Dept: OTHER | Facility: HOSPITAL | Age: 58
End: 2023-03-03
Payer: MEDICARE

## 2023-03-10 ENCOUNTER — HOSPITAL ENCOUNTER (OUTPATIENT)
Dept: CARDIOLOGY | Facility: HOSPITAL | Age: 58
Discharge: HOME | End: 2023-03-10
Admitting: INTERNAL MEDICINE
Payer: MEDICARE

## 2023-03-10 VITALS
BODY MASS INDEX: 28.35 KG/M2 | WEIGHT: 228 LBS | HEART RATE: 69 BPM | SYSTOLIC BLOOD PRESSURE: 116 MMHG | DIASTOLIC BLOOD PRESSURE: 83 MMHG | RESPIRATION RATE: 18 BRPM | HEIGHT: 75 IN | OXYGEN SATURATION: 94 %

## 2023-03-10 DIAGNOSIS — Z95.818 PRESENCE OF WATCHMAN LEFT ATRIAL APPENDAGE CLOSURE DEVICE: ICD-10-CM

## 2023-03-10 DIAGNOSIS — I48.0 PAROXYSMAL ATRIAL FIBRILLATION: ICD-10-CM

## 2023-03-10 LAB
BH CV ECHO MEAS - EF_3D-VOL: 40 %
BH CV VAS BP LEFT ARM: NORMAL MMHG
MAXIMAL PREDICTED HEART RATE: 162 BPM
STRESS TARGET HR: 138 BPM

## 2023-03-10 PROCEDURE — 25010000002 MIDAZOLAM PER 1 MG: Performed by: INTERNAL MEDICINE

## 2023-03-10 PROCEDURE — 93325 DOPPLER ECHO COLOR FLOW MAPG: CPT | Performed by: INTERNAL MEDICINE

## 2023-03-10 PROCEDURE — 93321 DOPPLER ECHO F-UP/LMTD STD: CPT | Performed by: INTERNAL MEDICINE

## 2023-03-10 PROCEDURE — 76376 3D RENDER W/INTRP POSTPROCES: CPT

## 2023-03-10 PROCEDURE — 99152 MOD SED SAME PHYS/QHP 5/>YRS: CPT | Performed by: PHYSICIAN ASSISTANT

## 2023-03-10 PROCEDURE — 93325 DOPPLER ECHO COLOR FLOW MAPG: CPT

## 2023-03-10 PROCEDURE — 93321 DOPPLER ECHO F-UP/LMTD STD: CPT

## 2023-03-10 PROCEDURE — 93312 ECHO TRANSESOPHAGEAL: CPT

## 2023-03-10 PROCEDURE — 93312 ECHO TRANSESOPHAGEAL: CPT | Performed by: INTERNAL MEDICINE

## 2023-03-10 PROCEDURE — 76376 3D RENDER W/INTRP POSTPROCES: CPT | Performed by: INTERNAL MEDICINE

## 2023-03-10 RX ORDER — MIDAZOLAM HYDROCHLORIDE 1 MG/ML
INJECTION INTRAMUSCULAR; INTRAVENOUS
Status: COMPLETED | OUTPATIENT
Start: 2023-03-10 | End: 2023-03-10

## 2023-03-10 RX ADMIN — MIDAZOLAM HYDROCHLORIDE 1 MG: 1 INJECTION, SOLUTION INTRAMUSCULAR; INTRAVENOUS at 14:26

## 2023-03-10 RX ADMIN — MIDAZOLAM HYDROCHLORIDE 2 MG: 1 INJECTION, SOLUTION INTRAMUSCULAR; INTRAVENOUS at 14:19

## 2023-03-10 NOTE — PROGRESS NOTES
JORGE L completed without complications.    Patient's ejection fraction does remain severely reduced similar to priors    Watchman device is well-seated with no periprosthetic flow.  Continue aspirin 81 mg daily    Patient may stop Plavix 75 mg daily

## 2023-03-10 NOTE — H&P
Date of Hospital Visit: 03/10/23  Encounter Provider: Hellen Fleming PA-C    Place of Service: [facility]  Patient Name: Bill Umana  :1965  MRN: [id number]     Primary Care Provider: Kim Goff APRN    Chief complaint: Atrial fibrillation, Watchman device in place    Patient Active Problem List    Diagnosis Date Noted   • Bilateral carotid artery stenosis 2021     Note Last Updated: 2021     · 16 to 49% bilateral internal carotid artery stenosis by duplex 2019     • SDH (subdural hematoma) 2021     Note Last Updated: 2021     · [facility] 21     • Chronic fatigue 2019   • Orthostatic hypotension 2019   • Palpitations 2019   • Nonsustained ventricular tachycardia (HCC) 2018     Note Last Updated: 2021     · interrogation demonstrates 4 episodes of nonsustained ventricular tachycardia and one episode of ventricular tachycardia converted with ATP without defibrillation  · patient on Sotalol for ventricular tachycardia followed by EP services     • AICD (automatic cardioverter/defibrillator) present 2018     Note Last Updated: 2021     S/P AICD implant ([employer])      • Paroxysmal atrial fibrillation (HCC) 2018     Note Last Updated: 3/21/2022     · LAAO 21  · JORGE L, 2022: 24 mm watchman left atrial appendage occlusion device in place.  Device is well-seated with no peridevice flow.  No device associated thrombus.     • Hyperlipidemia 2018   • Essential hypertension 2018   • Systolic congestive heart failure (HCC) 2018   • Nonischemic dilated cardiomyopathy (HCC) 2018     Note Last Updated: 3/21/2022     · S/P AICD implant ([employer])   · Cardiac catheterization 2018 with normal coronaries and an EF estimated at 40-45%  · EF by echocardiogram 2019 at 35 to 40% with grade 1 diastolic dysfunction  · Echocardiogram, 2021: Mild concentric  LVH.  Moderate global hypokinesis.  EF 35-40%.  Diastolic dysfunction.  Left atrium moderately dilated.  Mild aortic valve sclerosis without stenosis.  · JORGE L, 2/9/2022:  EF 26 to 30%.  LV systolic function severely decreased.          Subjective      History of Present Illness:  Patient is a 57-year-old with the above-noted history presents today as an outpatient to undergo transesophageal echocardiogram after having a Watchman device implanted over 1 year ago.  Patient has a history of atrial fibrillation on sotalol therapy.  He was last seen by Sony Vernon in our office October 2022 and had an acceptable QTc.  Patient has been on aspirin and Plavix since his Watchman device was placed.  Patient states that he actually just got out of the hospital.  He states he had another syncopal episode because of his orthostatic hypotension.  He states he received IV fluids and feels much better.  Patient had a CT of the head which was normal at that hospitalization.  Patient currently denies any chest pain, shortness of breath.  Patient also states he does not even remember having the last JORGE L performed.      Social History     Socioeconomic History   • Marital status:    Tobacco Use   • Smoking status: Never   • Smokeless tobacco: Never   Vaping Use   • Vaping Use: Never used   Substance and Sexual Activity   • Alcohol use: Never   • Drug use: Never   • Sexual activity: Yes     Partners: Female       Family History   Problem Relation Age of Onset   • Heart attack Mother    • Heart failure Mother    • Hypertension Mother    • Heart disease Father    • Heart failure Father    • Anemia Maternal Grandmother      Reviewed patient's past medical history, surgical history, social history and family history.  Current Outpatient Medications   Medication Instructions   • aspirin 81 mg, Oral, Daily   • carvedilol (COREG) 6.25 mg, Oral, 2 Times Daily   • clopidogrel (PLAVIX) 75 MG tablet TAKE 1 TABLET BY MOUTH EVERY DAY   •  dicyclomine (BENTYL) 10 mg, Oral, Daily   • fludrocortisone 0.1 mg, Oral, Daily   • FLUoxetine (PROZAC) 40 mg, Oral, Daily   • FLUoxetine (PROZAC) 20 mg, Oral, Daily   • furosemide (LASIX) 40 MG tablet TAKE 1 TABLET EVERY DAY   • gabapentin (NEURONTIN) 300 MG capsule 2 Times Daily - RT   • ibuprofen (ADVIL,MOTRIN) 800 mg, Oral, Every 6 Hours PRN, prn   • linaclotide (LINZESS) 72 mcg, Oral, Every Morning Before Breakfast   • nitroglycerin (NITROSTAT) 0.4 MG SL tablet 1 under the tongue as needed for angina, may repeat q5mins for up three doses   • pantoprazole (PROTONIX) 40 MG EC tablet TAKE 1 TABLET EVERY DAY   • potassium chloride (K-DUR,KLOR-CON) 10 MEQ CR tablet TAKE 1 TABLET EVERY DAY   • ramipril (ALTACE) 2.5 MG capsule TAKE 1 CAPSULE EVERY DAY   • Rimegepant Sulfate (Nurtec) 75 MG tablet dispersible tablet Oral, As Needed   • simvastatin (ZOCOR) 20 MG tablet TAKE 1 TABLET EVERY NIGHT   • sotalol (BETAPACE) 120 mg, Oral, 2 Times Daily   • tamsulosin (FLOMAX) 0.4 MG capsule 24 hr capsule 1 capsule, Oral, Daily   • traMADol (ULTRAM) 50 MG tablet 1 tablet, Oral, 2 Times Daily PRN   • traZODone (DESYREL) 100 mg, Oral, Nightly          Scheduled Meds:  Continuous Infusions:No current facility-administered medications for this encounter.      REVIEW OF SYSTEMS:   Review of Systems   Constitutional: Negative for chills and fever.   Cardiovascular: Negative for chest pain and palpitations.   Neurological: Positive for syncope.   All other systems reviewed and are negative.            Objective    Objective:   Reviewed patient's vital signs in the room.  Stable and there is no input by the nurse at the time of this dictation.  No intake or output data in the 24 hours ending 03/10/23 1310    Constitutional:       Appearance: Healthy appearance.   HENT:    Mouth/Throat:      Pharynx: Oropharynx is clear.   Neck:      Vascular: JVD normal.   Pulmonary:      Effort: Pulmonary effort is normal.      Breath sounds: No  wheezing. No rhonchi. No rales.   Cardiovascular:      Normal rate. Regular rhythm.      Murmurs: There is no murmur.      No rub.   Pulses:     Intact distal pulses.   Edema:     Peripheral edema absent.   Abdominal:      General: Bowel sounds are normal. There is no distension.   Musculoskeletal:         General: No deformity.      Cervical back: Normal range of motion. Skin:     General: Skin is warm and dry.   Neurological:      General: No focal deficit present.      Mental Status: Alert and oriented to person, place and time.       Lab Review:                No recent blood work to be reviewed    Telemetry: Normal sinus rhythm controlled rate      Results for orders placed during the hospital encounter of 11/15/22    Adult Transthoracic Echo Complete W/ Cont if Necessary Per Protocol    Interpretation Summary  •  Left ventricular ejection fraction appears to be 31 - 35%.  •  Left ventricular diastolic function is consistent with (grade I) impaired relaxation.  •  The right atrial cavity is mild to moderately  dilated.  •  Estimated right ventricular systolic pressure from tricuspid regurgitation is normal (<35 mmHg).    Technically adequate study.    1.  LV size is normal and global LV systolic function is severely depressed.  Visually estimated ejection fraction is 25 to 30%.  Borderline increased LV wall thickness with grade 1A diastolic dysfunction.  Moderate left atrial enlargement.  Mild to moderate right atrial enlargement.  RV size and function are grossly preserved.  Patient her defibrillator leads are identified in the right heart.  No septal defect or other intracavitary mass or thrombus.    2.  The mitral valve is morphologically normal with no mitral stenosis with trivial MR.  The aortic valve is grossly normally configured with no aortic stenosis and with trivial to mild AI.  Mild tricuspid regurgitation is identified from a morphologically normal valve.    3.  No pericardial or great vessel  pathology.    4.  Pulmonary artery systolic pressures are estimated in the low 30s.       Result Review:  I have personally reviewed the results from the time of admission and agree with these findings:  []  Laboratory  []  Radiology  [x]  EKG/Telemetry   []  Pathology  []  Old records  []  Other:           Assessment:   1. Paroxysmal atrial fibrillation, currently in normal sinus rhythm  2. S/p Watchman device December 2021  3. Orthostatic hypotension    Plan:   1. Undergo transesophageal echocardiogram with Dr. Sousa.  Risk and benefits were discussed with the patient and he agrees to proceed.  2. Will determine after the procedure if patient can come off of Plavix.  3. Encourage oral hydration after recent hospitalization for his orthostatic hypotension.      Further recommendations to be made postprocedure by Dr. Sousa.    Electronically signed by Hellen Fleming PA-C, 03/10/23, 1:30 PM EST.

## 2023-03-13 RX ORDER — CLOPIDOGREL BISULFATE 75 MG/1
TABLET ORAL
Qty: 90 TABLET | OUTPATIENT
Start: 2023-03-13

## 2023-03-24 ENCOUNTER — OFFICE VISIT (OUTPATIENT)
Dept: CARDIOLOGY | Facility: CLINIC | Age: 58
End: 2023-03-24
Payer: MEDICARE

## 2023-03-24 VITALS
SYSTOLIC BLOOD PRESSURE: 99 MMHG | HEIGHT: 75 IN | DIASTOLIC BLOOD PRESSURE: 60 MMHG | BODY MASS INDEX: 28.35 KG/M2 | WEIGHT: 228 LBS | HEART RATE: 76 BPM | OXYGEN SATURATION: 96 %

## 2023-03-24 DIAGNOSIS — I10 ESSENTIAL HYPERTENSION: ICD-10-CM

## 2023-03-24 DIAGNOSIS — I47.29 NONSUSTAINED VENTRICULAR TACHYCARDIA: ICD-10-CM

## 2023-03-24 DIAGNOSIS — I48.0 PAROXYSMAL ATRIAL FIBRILLATION: ICD-10-CM

## 2023-03-24 DIAGNOSIS — I27.20 PULMONARY HTN: ICD-10-CM

## 2023-03-24 DIAGNOSIS — Z95.810 AICD (AUTOMATIC CARDIOVERTER/DEFIBRILLATOR) PRESENT: ICD-10-CM

## 2023-03-24 DIAGNOSIS — Z95.818 PRESENCE OF WATCHMAN LEFT ATRIAL APPENDAGE CLOSURE DEVICE: ICD-10-CM

## 2023-03-24 DIAGNOSIS — I42.0 NONISCHEMIC DILATED CARDIOMYOPATHY: Primary | ICD-10-CM

## 2023-03-24 RX ORDER — MIDODRINE HYDROCHLORIDE 2.5 MG/1
2.5 TABLET ORAL
Qty: 90 TABLET | Refills: 3 | Status: SHIPPED | OUTPATIENT
Start: 2023-03-24

## 2023-03-24 RX ORDER — MECLIZINE HYDROCHLORIDE 25 MG/1
TABLET ORAL
COMMUNITY
Start: 2023-03-23

## 2023-03-24 RX ORDER — CLOPIDOGREL BISULFATE 75 MG/1
75 TABLET ORAL DAILY
COMMUNITY

## 2023-03-24 RX ORDER — SOTALOL HYDROCHLORIDE 120 MG/1
120 TABLET ORAL 2 TIMES DAILY
Qty: 180 TABLET | Refills: 3 | Status: SHIPPED | OUTPATIENT
Start: 2023-03-24

## 2023-03-24 NOTE — PROGRESS NOTES
Encounter Date:03/24/2023      Patient ID: Bill Umana is a 57 y.o. male.    Kim Goff, RAFY    Chief Complaint: Paroxysmal atrial fibrillation  and Orthostatic hypotension      PROBLEM LIST:  Patient Active Problem List    Diagnosis Date Noted   • Nonsustained ventricular tachycardia (HCC) 03/23/2018     Priority: High     Note Last Updated: 11/29/2021     · interrogation demonstrates 4 episodes of nonsustained ventricular tachycardia and one episode of ventricular tachycardia converted with ATP without defibrillation  · patient on Sotalol for ventricular tachycardia followed by EP services     • AICD (automatic cardioverter/defibrillator) present 03/05/2018     Priority: High     Note Last Updated: 11/29/2021     S/P AICD implant ([employer])      • Paroxysmal atrial fibrillation (HCC) 03/05/2018     Priority: High     Note Last Updated: 3/21/2022     · LAAO 12-16-21  · JORGE L, 2/9/2022: 24 mm watchman left atrial appendage occlusion device in place.  Device is well-seated with no peridevice flow.  No device associated thrombus.     • Nonischemic dilated cardiomyopathy (HCC) 02/19/2018     Priority: High     Note Last Updated: 3/21/2022     · S/P AICD implant ([employer])   · Cardiac catheterization March 2018 with normal coronaries and an EF estimated at 40-45%  · EF by echocardiogram November 2019 at 35 to 40% with grade 1 diastolic dysfunction  · Echocardiogram, 7/2/2021: Mild concentric LVH.  Moderate global hypokinesis.  EF 35-40%.  Diastolic dysfunction.  Left atrium moderately dilated.  Mild aortic valve sclerosis without stenosis.  · JORGE L, 2/9/2022:  EF 26 to 30%.  LV systolic function severely decreased.     • Presence of Watchman left atrial appendage closure device 03/24/2023     Priority: Medium   • Pulmonary HTN (HCC) 03/24/2023     Priority: Medium     Note Last Updated: 3/24/2023     1. JORGE L 3-10-23:   •  Left ventricular systolic function is moderately decreased. Left  ventricular ejection fraction appears to be 31 - 35%.  •  The left ventricular cavity is mildly-moderately dilated  •  Moderate tricuspid valve regurgitation is present. Estimated right ventricular systolic pressure from tricuspid regurgitation is mildly elevated (35-45 mmHg).     • Orthostatic hypotension 02/13/2019     Priority: Medium   • Systolic congestive heart failure (HCC) 02/19/2018     Priority: Medium   • Palpitations 01/28/2019     Priority: Low   • Hyperlipidemia 03/05/2018     Priority: Low   • Essential hypertension 03/05/2018     Priority: Low   • Bilateral carotid artery stenosis 11/29/2021     Note Last Updated: 11/29/2021     · 16 to 49% bilateral internal carotid artery stenosis by duplex November 2019     • SDH (subdural hematoma) 11/29/2021     Note Last Updated: 11/29/2021     · [facility] 7-23-21     • Chronic fatigue 12/04/2019               History of Present Illness  Patient presents today for follow-up with a history of NSVT, dilated, nonischemic cardiomyopathy with dual-chamber ICD and chronic systolic heart failure.  Paroxysmal atrial fibrillation with recent left atrial appendage occlusion.  Since his last appoint with our service he had a follow-up transesophageal echocardiogram showing a well-placed left atrial appendage closure device.  Notably, that exam also shows some mild to moderate pulmonary hypertension.  He was recently admitted to [facility] with syncope and collapse secondary to hypotension.  Carvedilol and sotalol were discontinued, states he was given 6 L of fluids and was discharged to home on his usual dose of Altace.  He returns today with no complaint of palpitations but is complaining of exertional midsternal chest discomfort and dyspnea.  The dyspnea occasionally occurs at rest.  He denies orthopnea, PND or lower extremity edema.  He had cardiac catheterization in 2018 showing angiographically normal coronary arteries.  His blood pressure continues to run in  "the 90s.  He drinks only 1 caffeinated beverage daily and states that he drinks \"lots of water\".    No Known Allergies    Current Outpatient Medications   Medication Instructions   • aspirin 81 mg, Oral, Daily   • clopidogrel (PLAVIX) 75 mg, Oral, Daily   • dicyclomine (BENTYL) 10 mg, Oral, Daily   • fludrocortisone 0.1 mg, Oral, Daily   • FLUoxetine (PROZAC) 40 mg, Oral, Daily   • FLUoxetine (PROZAC) 20 mg, Oral, Daily   • furosemide (LASIX) 40 MG tablet TAKE 1 TABLET EVERY DAY   • gabapentin (NEURONTIN) 300 MG capsule 2 Times Daily - RT   • ibuprofen (ADVIL,MOTRIN) 800 mg, Oral, Every 6 Hours PRN, prn   • linaclotide (LINZESS) 72 mcg, Oral, Every Morning Before Breakfast   • meclizine (ANTIVERT) 25 MG tablet No dose, route, or frequency recorded.   • nitroglycerin (NITROSTAT) 0.4 MG SL tablet 1 under the tongue as needed for angina, may repeat q5mins for up three doses   • pantoprazole (PROTONIX) 40 MG EC tablet TAKE 1 TABLET EVERY DAY   • potassium chloride (K-DUR,KLOR-CON) 10 MEQ CR tablet TAKE 1 TABLET EVERY DAY   • ramipril (ALTACE) 2.5 MG capsule TAKE 1 CAPSULE EVERY DAY   • Rimegepant Sulfate (Nurtec) 75 MG tablet dispersible tablet Oral, As Needed   • simvastatin (ZOCOR) 20 MG tablet TAKE 1 TABLET EVERY NIGHT   • tamsulosin (FLOMAX) 0.4 MG capsule 24 hr capsule 1 capsule, Oral, Daily   • traMADol (ULTRAM) 50 MG tablet 1 tablet, Oral, 2 Times Daily PRN   • traZODone (DESYREL) 100 mg, Oral, Nightly       .    Objective:     BP 99/60 (BP Location: Right arm, Patient Position: Sitting)   Pulse 76   Ht 190.5 cm (75\")   Wt 103 kg (228 lb)   SpO2 96%   BMI 28.50 kg/m²    Body mass index is 28.5 kg/m².     Vitals reviewed.   Constitutional:       Appearance: Well-developed.   Pulmonary:      Effort: Pulmonary effort is normal. No respiratory distress.      Breath sounds: Normal breath sounds. No wheezing. No rales.      Comments: Bases clear  Chest:      Chest wall: Not tender to palpatation.   Cardiovascular: "      Normal rate. Regular rhythm.      Murmurs: There is no murmur.      No gallop. No click. No rub.   Pulses:     Intact distal pulses.   Edema:     Peripheral edema absent.   Musculoskeletal: Normal range of motion.       Lab Review:                 TSH    TSH 10/5/22 11/23/22   TSH 1.210 2.610                   Procedures               Assessment:      Diagnosis Plan   1. Nonischemic dilated cardiomyopathy (HCC)  Ambulatory Referral to Cardiology: We are referring him to see  who is familiar with his case      2. AICD (automatic cardioverter/defibrillator) present    This patient's Cardiac Implanted Electronic Device was manually interrogated and reprogrammed during the patient encounter today.  Iterative programming changes were manually made to determine the sensing threshold, pacing threshold, lead impedance as well as underlying cardiac rhythm.  These programming changes were not limited to but included some or all of the following when appropriate: pacing mode, programmed AV delays, blanking periods, and refractory periods.  Data obtained as a result of these manual programing changes informed the patient's CIED permanent programming.    Normal lead parameters.  1 year battery life.  1 ventricular high heart rate of 2 seconds      3. Paroxysmal atrial fibrillation (HCC)   restart sotalol 120 mg twice daily.  He will have a follow-up EKG on Monday      4. Nonsustained ventricular tachycardia (HCC)   single ventricular high heart rate on device interrogation of 2 seconds.  Restart sotalol      5. Essential hypertension   ongoing orthostasis.  I am starting him on midodrine 2.5 mg 3 times daily.  This can be further uptitrated as indicated      6. Presence of Watchman left atrial appendage closure device   well-placed left atrial appendage occlusion device.  No indication for anticoagulation      7. Pulmonary HTN (HCC)  Ambulatory Referral to Cardiology.  Referring him to Dr. Sousa for  evaluation.  I do not feel his dyspnea is anginal.  He is euvolemic so I am reluctant to reviewed his dyspnea to heart failure.        Plan:     Stable cardiac status.  Continue current medications.   in 6 months, sooner as needed.  Thank you for allowing us to participate in the care of your patient.     Electronically signed by ROSEANNE Lindo, 03/24/23, 11:08 AM EDT.

## 2023-03-28 ENCOUNTER — OFFICE VISIT (OUTPATIENT)
Dept: SLEEP MEDICINE | Facility: HOSPITAL | Age: 58
End: 2023-03-28
Payer: MEDICARE

## 2023-03-28 VITALS
DIASTOLIC BLOOD PRESSURE: 65 MMHG | WEIGHT: 228 LBS | HEIGHT: 75 IN | BODY MASS INDEX: 28.35 KG/M2 | OXYGEN SATURATION: 97 % | HEART RATE: 60 BPM | SYSTOLIC BLOOD PRESSURE: 108 MMHG

## 2023-03-28 DIAGNOSIS — I42.9 CARDIOMYOPATHY, UNSPECIFIED TYPE: ICD-10-CM

## 2023-03-28 DIAGNOSIS — F51.04 CHRONIC INSOMNIA: ICD-10-CM

## 2023-03-28 DIAGNOSIS — G47.30 SLEEP APNEA, UNSPECIFIED TYPE: Primary | ICD-10-CM

## 2023-03-28 PROCEDURE — 1160F RVW MEDS BY RX/DR IN RCRD: CPT | Performed by: INTERNAL MEDICINE

## 2023-03-28 PROCEDURE — 1159F MED LIST DOCD IN RCRD: CPT | Performed by: INTERNAL MEDICINE

## 2023-03-28 PROCEDURE — 3074F SYST BP LT 130 MM HG: CPT | Performed by: INTERNAL MEDICINE

## 2023-03-28 PROCEDURE — 3078F DIAST BP <80 MM HG: CPT | Performed by: INTERNAL MEDICINE

## 2023-03-28 PROCEDURE — 99204 OFFICE O/P NEW MOD 45 MIN: CPT | Performed by: INTERNAL MEDICINE

## 2023-03-28 RX ORDER — ZOLPIDEM TARTRATE 5 MG/1
5 TABLET ORAL NIGHTLY PRN
Qty: 1 TABLET | Refills: 0 | Status: SHIPPED | OUTPATIENT
Start: 2023-03-28

## 2023-03-28 RX ORDER — GABAPENTIN 300 MG/1
CAPSULE ORAL
COMMUNITY

## 2023-03-28 RX ORDER — FUROSEMIDE 40 MG/1
TABLET ORAL
COMMUNITY

## 2023-03-28 NOTE — PROGRESS NOTES
New Sleep Patient Office Visit      Patient Name: Bill Umana    Referring Physician: Cosme Vernon PA    Chief Complaint:    Chief Complaint   Patient presents with   • Sleeping Problem       History of Present Illness: Bill Umana is a 57 y.o. male who is here today to establish care with Sleep Medicine.    57-year-old male with a past medical history of hypertension, nonischemic dilated cardiomyopathy status post AICD placement, paroxysmal atrial fibrillation s/p Watchman device, dyslipidemia, orthostatic hypotension, pulmonary hypertension presenting here for initial evaluation.  Patient states that he is having problem with snoring and witnessed apnea going on for some time.  He states that he does not sleep very well at night.  He has trouble going to sleep for long time and takes about 1 to 2 hours to go to sleep.  He does take trazodone and despite that he has trouble falling asleep.  He wakes up multiple times during the night for unclear reasons and sometimes to use the restroom and then he has trouble falling back asleep again.  As a result he is only sleeping 3 to 4 hours a night and he does feel sleepy and tired during the daytime.  Recently he was having episodes of passing out and he was started on medication for orthostatic hypotension.  He does get restless leg symptoms occasionally but does not think that his big issue which affects his sleep.  He generally watches TV or play on his phone before going to bed.  Denies any shiftwork either.  Denies any other parasomnias such as REM behavior disorder.  Denies any recent weight gain or weight loss.  Denies any leg edema.  Heartburn symptoms are under control that has not bothered him at night.  Denies any driving problems or sleep-related accidents either.    Patient does not smoke, does not drink alcohol no illicit drug use.  1 to 2 cups of coffee or occasional tea daily.    Further sleep history is as below.    [place] Scale:  1/24    Estimated average amount of sleep per night: 3-4 hours  Number of times  he wakes up at night: 2  Difficulty falling back asleep: yes  It usually takes 1-2 hours to go to sleep.  He feels sleepy upon waking up: yes  Rotating or night shift work: no    Drowsiness/Sleepiness:  He exhibits the following:  excessive daytime fatigue    Snoring/Breathing:  He exhibits the following:  loud snoring  awoken with dry mouth  quits breathing at night    Reflux:  He describes the following:  takes medication for reflux    Narcolepsy:  He exhibits the following:  none    RLS/PLMs:  He describes the following:  none    Insomnia:  He describes the following:  problems initiating sleep at night  frequent awakenings  restless sleep    Parasomnia:  He exhibits the following:  NA    Weight:  Weight change in the last year:  NA    Subjective      Review of Systems:   Review of Systems   Constitutional: Positive for chills and fatigue.   HENT: Positive for postnasal drip.    Respiratory: Positive for cough and chest tightness.    Cardiovascular: Positive for chest pain and palpitations.   Gastrointestinal: Positive for nausea.   Endocrine: Positive for cold intolerance.   Musculoskeletal: Negative.    Skin: Negative.    Neurological: Positive for dizziness, light-headedness and headache.   Hematological: Negative.    Psychiatric/Behavioral: Positive for decreased concentration and sleep disturbance. The patient is nervous/anxious.    All other systems reviewed and are negative.      Past Medical History:   Past Medical History:   Diagnosis Date   • Abnormal ECG    • Anxiety and depression    • Arthritis    • Asthma    • Atrial fibrillation ([facility]) 2015   • CHF (congestive heart failure) (Formerly [facility]) 2005   • Chronic bronchitis ([facility]ly [facility])    • Clotting disorder ([facility]ly [facility])    • Coronary artery disease 2002   • Diverticulitis    • Hyperlipidemia    • Hypertension 5 years ago   • Migraines    • Sleep apnea 10/22       Past Surgical History:   Past  Surgical History:   Procedure Laterality Date   • ATRIAL APPENDAGE EXCLUSION LEFT WITH TRANSESOPHAGEAL ECHOCARDIOGRAM N/A 12/16/2021    Procedure: Atrial Appendage Occlusion (Watchman), DNS any meds;  Surgeon: Lobo Downs DO;  Location: [facility] INVASIVE LOCATION;  Service: Cardiology;  Laterality: N/A;   • BACK SURGERY     • CARDIAC CATHETERIZATION     • CARDIAC DEFIBRILLATOR PLACEMENT  2002,   • COLON SURGERY  02/07/2020    Part of colon removed for diverticulitis    • COLONOSCOPY     • HERNIA REPAIR     • INSERT / REPLACE / REMOVE PACEMAKER      PM/ICD   • KNEE SURGERY     • OTHER SURGICAL HISTORY  2021    Watchman device   • SPINAL CORD STIMULATOR IMPLANT     • TONSILLECTOMY     • ULNAR NERVE REPAIR         Family History:   Family History   Problem Relation Age of Onset   • Heart attack Mother    • Heart failure Mother    • Hypertension Mother    • Colon cancer Mother    • Heart disease Father    • Heart failure Father    • Pancreatic cancer Sister    • Lung cancer Sister    • Anemia Maternal Grandmother        Social History:   Social History     Socioeconomic History   • Marital status:    Tobacco Use   • Smoking status: Never   • Smokeless tobacco: Never   Vaping Use   • Vaping Use: Never used   Substance and Sexual Activity   • Alcohol use: Never   • Drug use: Never   • Sexual activity: Yes     Partners: Female       Medications:     Current Outpatient Medications:   •  aspirin 81 MG tablet, Take 1 tablet by mouth Daily., Disp: 30 tablet, Rfl: 11  •  clopidogrel (PLAVIX) 75 MG tablet, Take 1 tablet by mouth Daily., Disp: , Rfl:   •  FLUoxetine (PROzac) 20 MG capsule, Take 1 capsule by mouth Daily., Disp: , Rfl:   •  FLUoxetine (PROzac) 40 MG capsule, Take 1 capsule by mouth Daily., Disp: , Rfl:   •  furosemide (LASIX) 40 MG tablet, TAKE 1 TABLET EVERY DAY, Disp: 90 tablet, Rfl: 3  •  furosemide (LASIX) 40 MG tablet, furosemide, Disp: , Rfl:   •  gabapentin (NEURONTIN) 300 MG capsule, 2 (Two)  Times a Day., Disp: , Rfl: 2  •  gabapentin (NEURONTIN) 300 MG capsule, gabapentin, Disp: , Rfl:   •  ibuprofen (ADVIL,MOTRIN) 800 MG tablet, Take 1 tablet by mouth Every 6 (Six) Hours As Needed for Moderate Pain. prn, Disp: , Rfl:   •  linaclotide (LINZESS) 72 MCG capsule capsule, Take 1 capsule by mouth Every Morning Before Breakfast., Disp: , Rfl:   •  meclizine (ANTIVERT) 25 MG tablet, , Disp: , Rfl:   •  midodrine (PROAMATINE) 2.5 MG tablet, Take 1 tablet by mouth 3 (Three) Times a Day Before Meals., Disp: 90 tablet, Rfl: 3  •  nitroglycerin (NITROSTAT) 0.4 MG SL tablet, 1 under the tongue as needed for angina, may repeat q5mins for up three doses (Patient taking differently: Place 1 tablet under the tongue Every 5 (Five) Minutes As Needed for Chest Pain. 1 under the tongue as needed for angina, may repeat q5mins for up three doses), Disp: 100 tablet, Rfl: 11  •  pantoprazole (PROTONIX) 40 MG EC tablet, TAKE 1 TABLET EVERY DAY, Disp: 90 tablet, Rfl: 1  •  potassium chloride (K-DUR,KLOR-CON) 10 MEQ CR tablet, TAKE 1 TABLET EVERY DAY, Disp: 90 tablet, Rfl: 3  •  ramipril (ALTACE) 2.5 MG capsule, TAKE 1 CAPSULE EVERY DAY, Disp: 90 capsule, Rfl: 3  •  simvastatin (ZOCOR) 20 MG tablet, TAKE 1 TABLET EVERY NIGHT, Disp: 90 tablet, Rfl: 3  •  sotalol (Betapace) 120 MG tablet, Take 1 tablet by mouth 2 (Two) Times a Day., Disp: 180 tablet, Rfl: 3  •  traMADol (ULTRAM) 50 MG tablet, Take 1 tablet by mouth 2 (Two) Times a Day As Needed., Disp: , Rfl:   •  traZODone (DESYREL) 100 MG tablet, Take 1 tablet by mouth Every Night., Disp: , Rfl:   •  dicyclomine (BENTYL) 10 MG capsule, Take 1 capsule by mouth Daily. (Patient not taking: Reported on 3/28/2023), Disp: 30 capsule, Rfl: 11  •  fludrocortisone 0.1 MG tablet, Take 1 tablet by mouth Daily. (Patient not taking: Reported on 3/28/2023), Disp: 90 tablet, Rfl: 3  •  Rimegepant Sulfate (Nurtec) 75 MG tablet dispersible tablet, Take  by mouth As Needed (MIGRAINE)., Disp: ,  "Rfl:   •  zolpidem (Ambien) 5 MG tablet, Take 1 tablet by mouth At Night As Needed for Sleep (for the night of sleep study)., Disp: 1 tablet, Rfl: 0    Allergies:   No Known Allergies    Objective     Physical Exam:  Vital Signs:   Vitals:    03/28/23 1033   BP: 108/65   Pulse: 60   SpO2: 97%   Weight: 103 kg (228 lb)   Height: 190.5 cm (75\")     Body mass index is 28.5 kg/m².    Physical Exam  Vitals and nursing note reviewed.   Constitutional:       General: He is not in acute distress.     Appearance: He is well-developed. He is not diaphoretic.   HENT:      Head: Normocephalic and atraumatic.      Comments: Mallampati 3 airway     Nose: Nose normal.   Eyes:      General:         Right eye: No discharge.         Left eye: No discharge.      Pupils: Pupils are equal, round, and reactive to light.   Neck:      Thyroid: No thyromegaly.      Trachea: No tracheal deviation.   Cardiovascular:      Rate and Rhythm: Normal rate and regular rhythm.      Heart sounds: Normal heart sounds. No murmur heard.    No friction rub. No gallop.   Pulmonary:      Effort: Pulmonary effort is normal. No respiratory distress.      Breath sounds: Normal breath sounds. No wheezing or rales.   Musculoskeletal:         General: No tenderness.      Cervical back: Neck supple.      Right lower leg: No edema.      Left lower leg: No edema.      Comments: Clubbing: none   Neurological:      Mental Status: He is alert and oriented to person, place, and time.   Psychiatric:         Behavior: Behavior normal.         Thought Content: Thought content normal.         Judgment: Judgment normal.         Results Review:   I reviewed the patient's new clinical results.  Lab Results   Component Value Date    TSH 2.610 11/23/2022       Assessment / Plan      Assessment:   Problem List Items Addressed This Visit    None  Visit Diagnoses     Sleep apnea, unspecified type    -  Primary    Relevant Orders    Polysomnography 4 or More Parameters    " Cardiomyopathy, unspecified type (HCC)        Chronic insomnia                Plan:   1.  Patient with history of dilated cardiomyopathy, paroxysmal atrial fibrillation, pulmonary hypertension, snoring with witnessed apneas, overweight and Mallampati 3 airway.All this put him at high risk of sleep disordered breathing.  Discussed at length about pathophysiology of sleep apnea.  Discussed side effects of untreated sleep apnea including poor quality sleep, cardiovascular, neurologic and metabolic side effects also discussed.  Further diagnostic testing recommended.  Patient is amenable to proceed with further testing and treatment as needed.  We discussed home study versus in lab study.    Given patient's severe cardiomyopathy risk of central sleep apnea/complex sleep apnea.  Discussed that in this case it will be beneficial to undergo facility based polysomnogram to evaluate his symptoms further.  We will set him up for a split-night study if he has significant sleep disordered breathing then we will try to treat him the same night.     2.  If found to have significant sleep apnea available treatment options discussed including CPAP therapy, oral appliance, surgical options.  Weight loss as a treatment option for sleep apnea also discussed and counseled.     3.  Increasing activity advised.      4.  Patient does have chronic insomnia.  He uses trazodone daily but that may not seem to be working well for him.  We will work with him once we evaluate his sleep apnea to try to work on his insomnia.  He will need to improve his sleep hygiene and will also work with cognitive behavioral therapy techniques to see if he can improve his insomnia problem.    5.  I did give him prescription for 1 tablet of 5 mg Ambien to be brought to the sleep lab in case he has difficult time sleeping during that night so that we can get meaningful study.    Thank you for allowing me to participate in the care of this patient.  We will follow  him closely.      Follow Up:   After PSG    Discussed plan of care in detail with patient today. Patient verbally understands and agrees. I spent 45 minutes on this date of service. This time includes time spent by me in the following activities:preparing for the visit, reviewing tests, obtaining and/or reviewing a separately obtained history, performing a medically appropriate examination, counseling the patient, ordering tests, or procedures, and/or documenting information in the medical record. This time excludes other separate billable services such as interpretation of tests or procedures, if applicable.       Ramsey Galvan MD  Pulmonary Critical Care and Sleep Medicine

## 2023-04-18 ENCOUNTER — TELEPHONE (OUTPATIENT)
Dept: CARDIOLOGY | Facility: CLINIC | Age: 58
End: 2023-04-18
Payer: MEDICARE

## 2023-04-18 NOTE — TELEPHONE ENCOUNTER
Patient is scheduled to have a caudal epidural steroid injection. He needs a cardiac risk assessment. They would also like to know if he can hold Plavix 7 days prior to the injection and 12 hours post?          [facility]   Vance Perkins,   (P)[phone]  (F)[phone]

## 2023-04-26 NOTE — H&P (VIEW-ONLY)
[facility] Cardiology   Consult  Bill Umana  1965    VISIT DATE:  04/27/23    PCP:   Kim Goff, APRN  [address] [facility] [id number]        CC:  Nonischemic dilated cardiomyopathy (New Patient), Chest Pain, and Shortness of Breath      Problem List:  1.  Nonischemic cardiomyopathy.  • S/P AICD implant ([employer])   • Cardiac catheterization March 2018 with normal coronaries and an EF estimated at 40-45%  • EF by echocardiogram November 2019 at 35 to 40% with grade 1 diastolic dysfunction  • Echocardiogram, 7/2/2021: Mild concentric LVH.  Moderate global hypokinesis.  EF 35-40%.  Diastolic dysfunction.  Left atrium moderately dilated.  Mild aortic valve sclerosis without stenosis.  • JORGE L, 2/9/2022:  EF 26 to 30%.  LV systolic function severely decreased.     2.  [employer] ICD  3.  NSVT  4.  Paroxysmal atrial fibrillation  5.  Subdural hematoma  6.  24 mm Watchman device placed  -Normal placement and function on March 2023 JORGE L.    7.  Nonobstructive carotid artery disease    History of Present Illness:  Bill Umana  Is a 58 y.o. male with pertinent cardiac history detailed above.  Patient in afib today.  Feels like he has been in it for about two weeks.  Associated with rapid palpitations, shortness of breath.  He generally feels unwell.  Started back on sotalol for rhythm control in March.  He is currently on aspirin and Plavix he has a history of a Watchman device that was last evaluated in March by JORGE L with appropriate placement and function.  In addition he is on medications to help support his blood pressure including Florinef and midodrine.  Also on a low-dose of ramipril due to his CHF.  No significant edema today.      Patient Active Problem List    Diagnosis Date Noted   • Depressive disorder 04/27/2023   • Migraine 04/27/2023   • Presence of Watchman left atrial appendage closure device 03/24/2023   • Pulmonary HTN 03/24/2023     Note Last  Updated: 3/24/2023     1. JORGE L 3-10-23:   •  Left ventricular systolic function is moderately decreased. Left ventricular ejection fraction appears to be 31 - 35%.  •  The left ventricular cavity is mildly-moderately dilated  •  Moderate tricuspid valve regurgitation is present. Estimated right ventricular systolic pressure from tricuspid regurgitation is mildly elevated (35-45 mmHg).     • Chest pain 01/05/2023   • Chronic pain disorder 09/20/2022   • Encounter for long-term (current) use of insulin 09/20/2022   • Lumbar post-laminectomy syndrome 09/20/2022   • Cervical radiculopathy 09/20/2022   • Low back pain 08/25/2022   • Neck pain 08/25/2022   • Bilateral carotid artery stenosis 11/29/2021     Note Last Updated: 11/29/2021     · 16 to 49% bilateral internal carotid artery stenosis by duplex November 2019     • SDH (subdural hematoma) 11/29/2021     Note Last Updated: 11/29/2021     · [facility] 7-23-21     • Chronic fatigue 12/04/2019   • Orthostatic hypotension 02/13/2019   • Palpitations 01/28/2019   • Nonsustained ventricular tachycardia (HCC) 03/23/2018     Note Last Updated: 11/29/2021     · interrogation demonstrates 4 episodes of nonsustained ventricular tachycardia and one episode of ventricular tachycardia converted with ATP without defibrillation  · patient on Sotalol for ventricular tachycardia followed by EP services     • AICD (automatic cardioverter/defibrillator) present 03/05/2018     Note Last Updated: 11/29/2021     S/P AICD implant ([employer])      • Paroxysmal atrial fibrillation (HCC) 03/05/2018     Note Last Updated: 3/21/2022     · LAAO 12-16-21  · JORGE L, 2/9/2022: 24 mm watchman left atrial appendage occlusion device in place.  Device is well-seated with no peridevice flow.  No device associated thrombus.     • Hyperlipidemia 03/05/2018   • Essential hypertension 03/05/2018   • Systolic congestive heart failure (HCC) 02/19/2018   • Nonischemic dilated cardiomyopathy (HCC) 02/19/2018      Note Last Updated: 3/21/2022     · S/P AICD implant ([employer])   · Cardiac catheterization March 2018 with normal coronaries and an EF estimated at 40-45%  · EF by echocardiogram November 2019 at 35 to 40% with grade 1 diastolic dysfunction  · Echocardiogram, 7/2/2021: Mild concentric LVH.  Moderate global hypokinesis.  EF 35-40%.  Diastolic dysfunction.  Left atrium moderately dilated.  Mild aortic valve sclerosis without stenosis.  · JORGE L, 2/9/2022:  EF 26 to 30%.  LV systolic function severely decreased.         No Known Allergies    Social History     Socioeconomic History   • Marital status:    Tobacco Use   • Smoking status: Never   • Smokeless tobacco: Never   Vaping Use   • Vaping Use: Never used   Substance and Sexual Activity   • Alcohol use: Never   • Drug use: Never   • Sexual activity: Yes     Partners: Female       Family History   Problem Relation Age of Onset   • Heart attack Mother    • Heart failure Mother    • Hypertension Mother    • Colon cancer Mother    • Heart disease Father    • Heart failure Father    • Pancreatic cancer Sister    • Lung cancer Sister    • Anemia Maternal Grandmother        Current Medications:    Current Outpatient Medications:   •  aspirin 81 MG tablet, Take 1 tablet by mouth Daily., Disp: 30 tablet, Rfl: 11  •  clopidogrel (PLAVIX) 75 MG tablet, Take 1 tablet by mouth Daily., Disp: , Rfl:   •  dicyclomine (BENTYL) 10 MG capsule, Take 1 capsule by mouth Daily., Disp: 30 capsule, Rfl: 11  •  FLUoxetine (PROzac) 20 MG capsule, Take 1 capsule by mouth Daily., Disp: , Rfl:   •  FLUoxetine (PROzac) 40 MG capsule, Take 1 capsule by mouth Daily., Disp: , Rfl:   •  furosemide (LASIX) 40 MG tablet, TAKE 1 TABLET EVERY DAY, Disp: 90 tablet, Rfl: 3  •  gabapentin (NEURONTIN) 300 MG capsule, 2 (Two) Times a Day., Disp: , Rfl: 2  •  ibuprofen (ADVIL,MOTRIN) 800 MG tablet, Take 1 tablet by mouth Every 6 (Six) Hours As Needed for Moderate Pain. prn, Disp: , Rfl:   •   linaclotide (LINZESS) 72 MCG capsule capsule, Take 1 capsule by mouth Every Morning Before Breakfast., Disp: , Rfl:   •  meclizine (ANTIVERT) 25 MG tablet, 1 tablet As Needed., Disp: , Rfl:   •  midodrine (PROAMATINE) 2.5 MG tablet, Take 1 tablet by mouth 3 (Three) Times a Day Before Meals., Disp: 90 tablet, Rfl: 3  •  nitroglycerin (NITROSTAT) 0.4 MG SL tablet, 1 under the tongue as needed for angina, may repeat q5mins for up three doses (Patient taking differently: Place 1 tablet under the tongue Every 5 (Five) Minutes As Needed for Chest Pain. 1 under the tongue as needed for angina, may repeat q5mins for up three doses), Disp: 100 tablet, Rfl: 11  •  pantoprazole (PROTONIX) 40 MG EC tablet, TAKE 1 TABLET EVERY DAY, Disp: 90 tablet, Rfl: 1  •  potassium chloride (K-DUR,KLOR-CON) 10 MEQ CR tablet, TAKE 1 TABLET EVERY DAY, Disp: 90 tablet, Rfl: 3  •  promethazine (PHENERGAN) 25 MG tablet, As Needed., Disp: , Rfl:   •  ramipril (ALTACE) 2.5 MG capsule, TAKE 1 CAPSULE EVERY DAY, Disp: 90 capsule, Rfl: 3  •  Rimegepant Sulfate (Nurtec) 75 MG tablet dispersible tablet, Take  by mouth As Needed (MIGRAINE)., Disp: , Rfl:   •  simvastatin (ZOCOR) 20 MG tablet, TAKE 1 TABLET EVERY NIGHT, Disp: 90 tablet, Rfl: 3  •  sotalol (Betapace) 120 MG tablet, Take 1 tablet by mouth 2 (Two) Times a Day., Disp: 180 tablet, Rfl: 3  •  traMADol (ULTRAM) 50 MG tablet, Take 1 tablet by mouth 2 (Two) Times a Day As Needed., Disp: , Rfl:   •  traZODone (DESYREL) 100 MG tablet, Take 1 tablet by mouth Every Night., Disp: , Rfl:   •  zolpidem (Ambien) 5 MG tablet, Take 1 tablet by mouth At Night As Needed for Sleep (for the night of sleep study)., Disp: 1 tablet, Rfl: 0     Review of Systems   Cardiovascular: Positive for chest pain, dyspnea on exertion, irregular heartbeat and palpitations.   Respiratory: Positive for shortness of breath.        Vitals:    04/27/23 0834   BP: 98/66   BP Location: Right arm   Patient Position: Sitting   Pulse:  "(!) 133   SpO2: 99%   Weight: 99.8 kg (220 lb)   Height: 190.5 cm (75\")       Physical Exam  Constitutional:       Appearance: Normal appearance.   Cardiovascular:      Rate and Rhythm: Tachycardia present. Rhythm irregular.      Heart sounds: No murmur heard.  Pulmonary:      Effort: Pulmonary effort is normal.      Breath sounds: Normal breath sounds. No rales.   Neurological:      General: No focal deficit present.      Mental Status: He is alert.         Diagnostic Data:    ECG 12 Lead    Date/Time: 4/27/2023 8:59 AM  Performed by: Devaughn Sousa MD  Authorized by: Devaughn Sousa MD   Comparison: compared with previous ECG from 3/27/2023  Comparison to previous ECG: Patient now in atrial fibrillation with RVR  Rhythm: atrial fibrillation  Rate: tachycardic  BPM: 138  QRS axis: normal  Other findings: left ventricular hypertrophy    Clinical impression: abnormal EKG          Lab Results   Component Value Date    CHLPL 146 06/25/2021    TRIG 54 06/25/2021    HDL 47 06/25/2021     Lab Results   Component Value Date    GLUCOSE 96 11/23/2022    BUN 11 11/23/2022    CREATININE 1.03 11/23/2022     11/23/2022    K 4.4 11/23/2022     11/23/2022    CO2 24 11/23/2022     Lab Results   Component Value Date    HGBA1C 5.7 (H) 06/25/2021     Lab Results   Component Value Date    WBC 7.0 11/23/2022    HGB 15.2 11/23/2022    HCT 43.8 11/23/2022     11/23/2022       Assessment:   Diagnosis Plan   1. Paroxysmal atrial fibrillation (HCC)  ECG 12 Lead    Cardioversion External in Cardiology Department          Plan:      1.  Nonischemic cardiomyopathy.  JORGE L, February 2023: EF 31 to 35%, mild MR, moderate TR  -Currently he is on midodrine and Florinef for orthostasis.  -stress test November 2022: No evidence of ischemia  -Previous heart cath with no obstructive CAD  -On low-dose ramipril, will continue this  -I think he should discontinue Florinef to avoid significant volume retention       2.  " [employer] ICD  -Follows with EP    3.  NSVT    4.  Paroxysmal atrial fibrillation  -Currently in atrial fibrillation with RVR  -Sotalol 120 mg twice daily  -On aspirin Plavix status post watchman  -He is n.p.o. today.  We will have him proceed with an external cardioversion later this afternoon to help restore sinus rhythm.    5.  Subdural hematoma  -Occurred in 2021    6.  24 mm Watchman device placed  -Appropriate placement and no periprosthetic flow on 1 year follow-up JORGE L  -Currently on aspirin Plavix    7.  Nonobstructive carotid artery disease  -To 50% stenosis bilateral    8.  History of hypotension  -On midodrine and Florinef  -I will have him continue the midodrine but stopped Florinef at this time.      Advance Care Planning   ACP discussion was held with the patient during this visit. Patient has an advance directive (not in EMR), copy requested.       Devaughn Sousa MD [facility]

## 2023-04-26 NOTE — PROGRESS NOTES
[facility] Cardiology   Consult  Bill Umana  1965    VISIT DATE:  04/27/23    PCP:   Kim Goff, APRN  [address] [facility] [address]        CC:  Nonischemic dilated cardiomyopathy (New Patient), Chest Pain, and Shortness of Breath      Problem List:  1.  Nonischemic cardiomyopathy.  • S/P AICD implant ([employer])   • Cardiac catheterization March 2018 with normal coronaries and an EF estimated at 40-45%  • EF by echocardiogram November 2019 at 35 to 40% with grade 1 diastolic dysfunction  • Echocardiogram, 7/2/2021: Mild concentric LVH.  Moderate global hypokinesis.  EF 35-40%.  Diastolic dysfunction.  Left atrium moderately dilated.  Mild aortic valve sclerosis without stenosis.  • JORGE L, 2/9/2022:  EF 26 to 30%.  LV systolic function severely decreased.     2.  [employer] ICD  3.  NSVT  4.  Paroxysmal atrial fibrillation  5.  Subdural hematoma  6.  24 mm Watchman device placed  -Normal placement and function on March 2023 JORGE L.    7.  Nonobstructive carotid artery disease    History of Present Illness:  Bill Umana  Is a 58 y.o. male with pertinent cardiac history detailed above.  Patient in afib today.  Feels like he has been in it for about two weeks.  Associated with rapid palpitations, shortness of breath.  He generally feels unwell.  Started back on sotalol for rhythm control in March.  He is currently on aspirin and Plavix he has a history of a Watchman device that was last evaluated in March by JORGE L with appropriate placement and function.  In addition he is on medications to help support his blood pressure including Florinef and midodrine.  Also on a low-dose of ramipril due to his CHF.  No significant edema today.      Patient Active Problem List    Diagnosis Date Noted   • Depressive disorder 04/27/2023   • Migraine 04/27/2023   • Presence of Watchman left atrial appendage closure device 03/24/2023   • Pulmonary HTN 03/24/2023     Note Last  Updated: 3/24/2023     1. JORGE L 3-10-23:   •  Left ventricular systolic function is moderately decreased. Left ventricular ejection fraction appears to be 31 - 35%.  •  The left ventricular cavity is mildly-moderately dilated  •  Moderate tricuspid valve regurgitation is present. Estimated right ventricular systolic pressure from tricuspid regurgitation is mildly elevated (35-45 mmHg).     • Chest pain 01/05/2023   • Chronic pain disorder 09/20/2022   • Encounter for long-term (current) use of insulin 09/20/2022   • Lumbar post-laminectomy syndrome 09/20/2022   • Cervical radiculopathy 09/20/2022   • Low back pain 08/25/2022   • Neck pain 08/25/2022   • Bilateral carotid artery stenosis 11/29/2021     Note Last Updated: 11/29/2021     · 16 to 49% bilateral internal carotid artery stenosis by duplex November 2019     • SDH (subdural hematoma) 11/29/2021     Note Last Updated: 11/29/2021     · [facility] 7-23-21     • Chronic fatigue 12/04/2019   • Orthostatic hypotension 02/13/2019   • Palpitations 01/28/2019   • Nonsustained ventricular tachycardia (HCC) 03/23/2018     Note Last Updated: 11/29/2021     · interrogation demonstrates 4 episodes of nonsustained ventricular tachycardia and one episode of ventricular tachycardia converted with ATP without defibrillation  · patient on Sotalol for ventricular tachycardia followed by EP services     • AICD (automatic cardioverter/defibrillator) present 03/05/2018     Note Last Updated: 11/29/2021     S/P AICD implant ([employer])      • Paroxysmal atrial fibrillation (HCC) 03/05/2018     Note Last Updated: 3/21/2022     · LAAO 12-16-21  · JORGE L, 2/9/2022: 24 mm watchman left atrial appendage occlusion device in place.  Device is well-seated with no peridevice flow.  No device associated thrombus.     • Hyperlipidemia 03/05/2018   • Essential hypertension 03/05/2018   • Systolic congestive heart failure (HCC) 02/19/2018   • Nonischemic dilated cardiomyopathy (HCC) 02/19/2018      Note Last Updated: 3/21/2022     · S/P AICD implant ([employer])   · Cardiac catheterization March 2018 with normal coronaries and an EF estimated at 40-45%  · EF by echocardiogram November 2019 at 35 to 40% with grade 1 diastolic dysfunction  · Echocardiogram, 7/2/2021: Mild concentric LVH.  Moderate global hypokinesis.  EF 35-40%.  Diastolic dysfunction.  Left atrium moderately dilated.  Mild aortic valve sclerosis without stenosis.  · JORGE L, 2/9/2022:  EF 26 to 30%.  LV systolic function severely decreased.         No Known Allergies    Social History     Socioeconomic History   • Marital status:    Tobacco Use   • Smoking status: Never   • Smokeless tobacco: Never   Vaping Use   • Vaping Use: Never used   Substance and Sexual Activity   • Alcohol use: Never   • Drug use: Never   • Sexual activity: Yes     Partners: Female       Family History   Problem Relation Age of Onset   • Heart attack Mother    • Heart failure Mother    • Hypertension Mother    • Colon cancer Mother    • Heart disease Father    • Heart failure Father    • Pancreatic cancer Sister    • Lung cancer Sister    • Anemia Maternal Grandmother        Current Medications:    Current Outpatient Medications:   •  aspirin 81 MG tablet, Take 1 tablet by mouth Daily., Disp: 30 tablet, Rfl: 11  •  clopidogrel (PLAVIX) 75 MG tablet, Take 1 tablet by mouth Daily., Disp: , Rfl:   •  dicyclomine (BENTYL) 10 MG capsule, Take 1 capsule by mouth Daily., Disp: 30 capsule, Rfl: 11  •  FLUoxetine (PROzac) 20 MG capsule, Take 1 capsule by mouth Daily., Disp: , Rfl:   •  FLUoxetine (PROzac) 40 MG capsule, Take 1 capsule by mouth Daily., Disp: , Rfl:   •  furosemide (LASIX) 40 MG tablet, TAKE 1 TABLET EVERY DAY, Disp: 90 tablet, Rfl: 3  •  gabapentin (NEURONTIN) 300 MG capsule, 2 (Two) Times a Day., Disp: , Rfl: 2  •  ibuprofen (ADVIL,MOTRIN) 800 MG tablet, Take 1 tablet by mouth Every 6 (Six) Hours As Needed for Moderate Pain. prn, Disp: , Rfl:   •   linaclotide (LINZESS) 72 MCG capsule capsule, Take 1 capsule by mouth Every Morning Before Breakfast., Disp: , Rfl:   •  meclizine (ANTIVERT) 25 MG tablet, 1 tablet As Needed., Disp: , Rfl:   •  midodrine (PROAMATINE) 2.5 MG tablet, Take 1 tablet by mouth 3 (Three) Times a Day Before Meals., Disp: 90 tablet, Rfl: 3  •  nitroglycerin (NITROSTAT) 0.4 MG SL tablet, 1 under the tongue as needed for angina, may repeat q5mins for up three doses (Patient taking differently: Place 1 tablet under the tongue Every 5 (Five) Minutes As Needed for Chest Pain. 1 under the tongue as needed for angina, may repeat q5mins for up three doses), Disp: 100 tablet, Rfl: 11  •  pantoprazole (PROTONIX) 40 MG EC tablet, TAKE 1 TABLET EVERY DAY, Disp: 90 tablet, Rfl: 1  •  potassium chloride (K-DUR,KLOR-CON) 10 MEQ CR tablet, TAKE 1 TABLET EVERY DAY, Disp: 90 tablet, Rfl: 3  •  promethazine (PHENERGAN) 25 MG tablet, As Needed., Disp: , Rfl:   •  ramipril (ALTACE) 2.5 MG capsule, TAKE 1 CAPSULE EVERY DAY, Disp: 90 capsule, Rfl: 3  •  Rimegepant Sulfate (Nurtec) 75 MG tablet dispersible tablet, Take  by mouth As Needed (MIGRAINE)., Disp: , Rfl:   •  simvastatin (ZOCOR) 20 MG tablet, TAKE 1 TABLET EVERY NIGHT, Disp: 90 tablet, Rfl: 3  •  sotalol (Betapace) 120 MG tablet, Take 1 tablet by mouth 2 (Two) Times a Day., Disp: 180 tablet, Rfl: 3  •  traMADol (ULTRAM) 50 MG tablet, Take 1 tablet by mouth 2 (Two) Times a Day As Needed., Disp: , Rfl:   •  traZODone (DESYREL) 100 MG tablet, Take 1 tablet by mouth Every Night., Disp: , Rfl:   •  zolpidem (Ambien) 5 MG tablet, Take 1 tablet by mouth At Night As Needed for Sleep (for the night of sleep study)., Disp: 1 tablet, Rfl: 0     Review of Systems   Cardiovascular: Positive for chest pain, dyspnea on exertion, irregular heartbeat and palpitations.   Respiratory: Positive for shortness of breath.        Vitals:    04/27/23 0834   BP: 98/66   BP Location: Right arm   Patient Position: Sitting   Pulse:  "(!) 133   SpO2: 99%   Weight: 99.8 kg (220 lb)   Height: 190.5 cm (75\")       Physical Exam  Constitutional:       Appearance: Normal appearance.   Cardiovascular:      Rate and Rhythm: Tachycardia present. Rhythm irregular.      Heart sounds: No murmur heard.  Pulmonary:      Effort: Pulmonary effort is normal.      Breath sounds: Normal breath sounds. No rales.   Neurological:      General: No focal deficit present.      Mental Status: He is alert.         Diagnostic Data:    ECG 12 Lead    Date/Time: 4/27/2023 8:59 AM  Performed by: Devaughn Sousa MD  Authorized by: Devaughn Sousa MD   Comparison: compared with previous ECG from 3/27/2023  Comparison to previous ECG: Patient now in atrial fibrillation with RVR  Rhythm: atrial fibrillation  Rate: tachycardic  BPM: 138  QRS axis: normal  Other findings: left ventricular hypertrophy    Clinical impression: abnormal EKG          Lab Results   Component Value Date    CHLPL 146 06/25/2021    TRIG 54 06/25/2021    HDL 47 06/25/2021     Lab Results   Component Value Date    GLUCOSE 96 11/23/2022    BUN 11 11/23/2022    CREATININE 1.03 11/23/2022     11/23/2022    K 4.4 11/23/2022     11/23/2022    CO2 24 11/23/2022     Lab Results   Component Value Date    HGBA1C 5.7 (H) 06/25/2021     Lab Results   Component Value Date    WBC 7.0 11/23/2022    HGB 15.2 11/23/2022    HCT 43.8 11/23/2022     11/23/2022       Assessment:   Diagnosis Plan   1. Paroxysmal atrial fibrillation (HCC)  ECG 12 Lead    Cardioversion External in Cardiology Department          Plan:      1.  Nonischemic cardiomyopathy.  JORGE L, February 2023: EF 31 to 35%, mild MR, moderate TR  -Currently he is on midodrine and Florinef for orthostasis.  -stress test November 2022: No evidence of ischemia  -Previous heart cath with no obstructive CAD  -On low-dose ramipril, will continue this  -I think he should discontinue Florinef to avoid significant volume retention       2.  " [employer] ICD  -Follows with EP    3.  NSVT    4.  Paroxysmal atrial fibrillation  -Currently in atrial fibrillation with RVR  -Sotalol 120 mg twice daily  -On aspirin Plavix status post watchman  -He is n.p.o. today.  We will have him proceed with an external cardioversion later this afternoon to help restore sinus rhythm.    5.  Subdural hematoma  -Occurred in 2021    6.  24 mm Watchman device placed  -Appropriate placement and no periprosthetic flow on 1 year follow-up JORGE L  -Currently on aspirin Plavix    7.  Nonobstructive carotid artery disease  -To 50% stenosis bilateral    8.  History of hypotension  -On midodrine and Florinef  -I will have him continue the midodrine but stopped Florinef at this time.      Advance Care Planning   ACP discussion was held with the patient during this visit. Patient has an advance directive (not in EMR), copy requested.       Devaughn Sousa MD [facility]

## 2023-04-27 ENCOUNTER — HOSPITAL ENCOUNTER (OUTPATIENT)
Dept: CARDIOLOGY | Facility: HOSPITAL | Age: 58
Discharge: HOME | End: 2023-04-27
Attending: INTERNAL MEDICINE | Admitting: INTERNAL MEDICINE
Payer: MEDICARE

## 2023-04-27 ENCOUNTER — OFFICE VISIT (OUTPATIENT)
Dept: CARDIOLOGY | Facility: CLINIC | Age: 58
End: 2023-04-27
Payer: MEDICARE

## 2023-04-27 VITALS
WEIGHT: 221.4 LBS | HEIGHT: 75 IN | DIASTOLIC BLOOD PRESSURE: 78 MMHG | HEART RATE: 80 BPM | SYSTOLIC BLOOD PRESSURE: 122 MMHG | OXYGEN SATURATION: 96 % | RESPIRATION RATE: 18 BRPM | BODY MASS INDEX: 27.53 KG/M2 | TEMPERATURE: 97.6 F

## 2023-04-27 VITALS
OXYGEN SATURATION: 99 % | WEIGHT: 220 LBS | HEIGHT: 75 IN | BODY MASS INDEX: 27.35 KG/M2 | HEART RATE: 133 BPM | SYSTOLIC BLOOD PRESSURE: 98 MMHG | DIASTOLIC BLOOD PRESSURE: 66 MMHG

## 2023-04-27 DIAGNOSIS — I48.0 PAROXYSMAL ATRIAL FIBRILLATION: Primary | ICD-10-CM

## 2023-04-27 DIAGNOSIS — I48.0 PAROXYSMAL ATRIAL FIBRILLATION: ICD-10-CM

## 2023-04-27 PROBLEM — M54.2 NECK PAIN: Status: ACTIVE | Noted: 2022-08-25

## 2023-04-27 PROBLEM — M54.12 CERVICAL RADICULOPATHY: Status: ACTIVE | Noted: 2022-09-20

## 2023-04-27 PROBLEM — M54.50 LOW BACK PAIN: Status: ACTIVE | Noted: 2022-08-25

## 2023-04-27 PROBLEM — I50.22 CHRONIC SYSTOLIC HEART FAILURE: Status: ACTIVE | Noted: 2023-04-27

## 2023-04-27 PROBLEM — M96.1 LUMBAR POST-LAMINECTOMY SYNDROME: Status: ACTIVE | Noted: 2022-09-20

## 2023-04-27 PROBLEM — G43.909 MIGRAINE: Status: ACTIVE | Noted: 2023-04-27

## 2023-04-27 PROBLEM — R07.9 CHEST PAIN: Status: ACTIVE | Noted: 2023-01-05

## 2023-04-27 PROBLEM — F32.A DEPRESSIVE DISORDER: Status: ACTIVE | Noted: 2023-04-27

## 2023-04-27 PROBLEM — G89.4 CHRONIC PAIN DISORDER: Status: ACTIVE | Noted: 2022-09-20

## 2023-04-27 PROBLEM — Z79.4 ENCOUNTER FOR LONG-TERM (CURRENT) USE OF INSULIN: Status: ACTIVE | Noted: 2022-09-20

## 2023-04-27 LAB
ANION GAP SERPL CALCULATED.3IONS-SCNC: 9 MMOL/L (ref 5–15)
BUN SERPL-MCNC: 12 MG/DL (ref 6–20)
BUN/CREAT SERPL: 12 (ref 7–25)
CALCIUM SPEC-SCNC: 9.3 MG/DL (ref 8.6–10.5)
CHLORIDE SERPL-SCNC: 105 MMOL/L (ref 98–107)
CO2 SERPL-SCNC: 25 MMOL/L (ref 22–29)
CREAT SERPL-MCNC: 1 MG/DL (ref 0.76–1.27)
DEPRECATED RDW RBC AUTO: 42.1 FL (ref 37–54)
EGFRCR SERPLBLD CKD-EPI 2021: 86.7 ML/MIN/1.73
ERYTHROCYTE [DISTWIDTH] IN BLOOD BY AUTOMATED COUNT: 13.1 % (ref 12.3–15.4)
GLUCOSE SERPL-MCNC: 107 MG/DL (ref 65–99)
HCT VFR BLD AUTO: 44.9 % (ref 37.5–51)
HGB BLD-MCNC: 15 G/DL (ref 13–17.7)
MCH RBC QN AUTO: 29.5 PG (ref 26.6–33)
MCHC RBC AUTO-ENTMCNC: 33.4 G/DL (ref 31.5–35.7)
MCV RBC AUTO: 88.4 FL (ref 79–97)
PLATELET # BLD AUTO: 277 10*3/MM3 (ref 140–450)
PMV BLD AUTO: 10 FL (ref 6–12)
POTASSIUM SERPL-SCNC: 4.2 MMOL/L (ref 3.5–5.2)
RBC # BLD AUTO: 5.08 10*6/MM3 (ref 4.14–5.8)
SODIUM SERPL-SCNC: 139 MMOL/L (ref 136–145)
TROPONIN T SERPL HS-MCNC: 10 NG/L
WBC NRBC COR # BLD: 6.92 10*3/MM3 (ref 3.4–10.8)

## 2023-04-27 PROCEDURE — 93005 ELECTROCARDIOGRAM TRACING: CPT | Performed by: INTERNAL MEDICINE

## 2023-04-27 PROCEDURE — 93010 ELECTROCARDIOGRAM REPORT: CPT | Performed by: INTERNAL MEDICINE

## 2023-04-27 PROCEDURE — 85027 COMPLETE CBC AUTOMATED: CPT | Performed by: INTERNAL MEDICINE

## 2023-04-27 PROCEDURE — 80048 BASIC METABOLIC PNL TOTAL CA: CPT | Performed by: INTERNAL MEDICINE

## 2023-04-27 PROCEDURE — 84484 ASSAY OF TROPONIN QUANT: CPT | Performed by: INTERNAL MEDICINE

## 2023-04-27 RX ORDER — PREDNISONE 20 MG/1
TABLET ORAL
COMMUNITY
Start: 2023-04-05 | End: 2023-04-27

## 2023-04-27 RX ORDER — CEPHALEXIN 500 MG/1
1 CAPSULE ORAL EVERY 6 HOURS
COMMUNITY
Start: 2023-04-23 | End: 2023-04-27

## 2023-04-27 RX ORDER — SOTALOL HYDROCHLORIDE 80 MG/1
80 TABLET ORAL 2 TIMES DAILY
Status: ON HOLD | COMMUNITY
End: 2023-04-27 | Stop reason: SDUPTHER

## 2023-04-27 RX ORDER — PROMETHAZINE HYDROCHLORIDE 25 MG/1
TABLET ORAL AS NEEDED
Status: ON HOLD | COMMUNITY
Start: 2023-04-26 | End: 2023-04-27

## 2023-04-27 RX ORDER — ALBUTEROL SULFATE 90 UG/1
2 AEROSOL, METERED RESPIRATORY (INHALATION) EVERY 4 HOURS PRN
COMMUNITY

## 2023-04-27 RX ORDER — SOTALOL HYDROCHLORIDE 80 MG/1
80 TABLET ORAL 2 TIMES DAILY
Qty: 60 TABLET | Refills: 6 | Status: SHIPPED | OUTPATIENT
Start: 2023-04-27 | End: 2023-05-02

## 2023-04-27 RX ORDER — METHOCARBAMOL 500 MG/1
500 TABLET, FILM COATED ORAL EVERY 8 HOURS
COMMUNITY

## 2023-04-27 NOTE — INTERVAL H&P NOTE
The above serves as the H&P for this admission.  The patient was referred to the CVICU with plans undergo elective cardioversion of atrial fibrillation.  While in CDU he spontaneously converted to sinus rhythm as well as an atrial paced rhythm with PVCs/bigeminy.  He was symptomatic with the PVCs with complaints of chest pain.  This has been a previously known issue for him.  A high-sensitivity troponin was checked and returned within normal limits.  He has had a stress test within the last 6 months for these concerns showing no ischemia    I we will have him increase his sotalol to 80 mg twice daily (he reported he was taking 40 mg a.m. and 80 mg p.m.).  Request EP follow-up in 1 month.  For atrial fibrillation recurrences despite sotalol he may be a candidate for ablation.  Briefly discussed with Dr. Downs today

## 2023-04-28 ENCOUNTER — OFFICE VISIT (OUTPATIENT)
Dept: CARDIOLOGY | Facility: CLINIC | Age: 58
End: 2023-04-28
Payer: MEDICARE

## 2023-04-28 ENCOUNTER — TELEPHONE (OUTPATIENT)
Dept: CARDIOLOGY | Facility: CLINIC | Age: 58
End: 2023-04-28
Payer: MEDICARE

## 2023-04-28 DIAGNOSIS — I48.0 PAROXYSMAL ATRIAL FIBRILLATION: ICD-10-CM

## 2023-04-28 DIAGNOSIS — I47.29 NONSUSTAINED VENTRICULAR TACHYCARDIA: ICD-10-CM

## 2023-04-28 DIAGNOSIS — Z95.810 AICD (AUTOMATIC CARDIOVERTER/DEFIBRILLATOR) PRESENT: ICD-10-CM

## 2023-04-28 DIAGNOSIS — I42.0 NONISCHEMIC DILATED CARDIOMYOPATHY: ICD-10-CM

## 2023-04-28 LAB
QT INTERVAL: 396 MS
QT INTERVAL: 400 MS
QTC INTERVAL: 440 MS
QTC INTERVAL: 471 MS

## 2023-04-28 NOTE — TELEPHONE ENCOUNTER
Mr Wood called with c/o palpitations/PVCs.  He said he was checked in Dr Schwarz's office today.  Spoke with [facility] and she reports he was having bigeminy PVCs and was symptomatic.  He reports these make him feel terrible. Spoke with scheduling and adding in to Dr Downs's clinic on Monday with a device check.  Instructed to go to ER if symptoms get worse. He verbalized understanding.

## 2023-05-01 ENCOUNTER — OFFICE VISIT (OUTPATIENT)
Dept: CARDIOLOGY | Facility: CLINIC | Age: 58
End: 2023-05-01
Payer: MEDICARE

## 2023-05-01 VITALS
DIASTOLIC BLOOD PRESSURE: 70 MMHG | SYSTOLIC BLOOD PRESSURE: 122 MMHG | BODY MASS INDEX: 27.48 KG/M2 | OXYGEN SATURATION: 98 % | HEIGHT: 75 IN | WEIGHT: 221 LBS | HEART RATE: 98 BPM

## 2023-05-01 DIAGNOSIS — Z95.810 AICD (AUTOMATIC CARDIOVERTER/DEFIBRILLATOR) PRESENT: ICD-10-CM

## 2023-05-01 DIAGNOSIS — I48.0 PAROXYSMAL ATRIAL FIBRILLATION: ICD-10-CM

## 2023-05-01 DIAGNOSIS — I47.29 NONSUSTAINED VENTRICULAR TACHYCARDIA: ICD-10-CM

## 2023-05-01 DIAGNOSIS — I42.0 NONISCHEMIC DILATED CARDIOMYOPATHY: Primary | ICD-10-CM

## 2023-05-01 NOTE — PROGRESS NOTES
Patient ID: Bill Umana is a 58 y.o. male.    Kim Goff, RAFY    Chief Complaint: Paroxysmal atrial fibrillation      PROBLEM LIST:  Patient Active Problem List    Diagnosis Date Noted   • Nonsustained ventricular tachycardia (HCC) 03/23/2018     Priority: High     Note Last Updated: 11/29/2021     · interrogation demonstrates 4 episodes of nonsustained ventricular tachycardia and one episode of ventricular tachycardia converted with ATP without defibrillation  · patient on Sotalol for ventricular tachycardia followed by EP services     • Nonischemic dilated cardiomyopathy (HCC) 02/19/2018     Priority: High     Note Last Updated: 3/21/2022     · S/P AICD implant ([employer])   · Cardiac catheterization March 2018 with normal coronaries and an EF estimated at 40-45%  · EF by echocardiogram November 2019 at 35 to 40% with grade 1 diastolic dysfunction  · Echocardiogram, 7/2/2021: Mild concentric LVH.  Moderate global hypokinesis.  EF 35-40%.  Diastolic dysfunction.  Left atrium moderately dilated.  Mild aortic valve sclerosis without stenosis.  · JORGE L, 2/9/2022:  EF 26 to 30%.  LV systolic function severely decreased.     • Depressive disorder 04/27/2023   • Migraine 04/27/2023   • Chronic systolic heart failure (CMS/HCC) 04/27/2023   • Presence of Watchman left atrial appendage closure device 03/24/2023   • Pulmonary HTN 03/24/2023     Note Last Updated: 3/24/2023     1. JORGE L 3-10-23:   •  Left ventricular systolic function is moderately decreased. Left ventricular ejection fraction appears to be 31 - 35%.  •  The left ventricular cavity is mildly-moderately dilated  •  Moderate tricuspid valve regurgitation is present. Estimated right ventricular systolic pressure from tricuspid regurgitation is mildly elevated (35-45 mmHg).     • Chest pain 01/05/2023   • Chronic pain disorder 09/20/2022   • Encounter for long-term (current) use of insulin 09/20/2022   • Lumbar post-laminectomy  syndrome 09/20/2022   • Cervical radiculopathy 09/20/2022   • Low back pain 08/25/2022   • Neck pain 08/25/2022   • Bilateral carotid artery stenosis 11/29/2021     Note Last Updated: 11/29/2021     · 16 to 49% bilateral internal carotid artery stenosis by duplex November 2019     • SDH (subdural hematoma) 11/29/2021     Note Last Updated: 11/29/2021     · [facility] 7-23-21     • Chronic fatigue 12/04/2019   • Orthostatic hypotension 02/13/2019   • Palpitations 01/28/2019   • AICD (automatic cardioverter/defibrillator) present 03/05/2018     Note Last Updated: 11/29/2021     S/P AICD implant ([employer])      • Paroxysmal atrial fibrillation (HCC) 03/05/2018     Note Last Updated: 3/21/2022     · LAAO 12-16-21  · JORGE L, 2/9/2022: 24 mm watchman left atrial appendage occlusion device in place.  Device is well-seated with no peridevice flow.  No device associated thrombus.     • Hyperlipidemia 03/05/2018   • Essential hypertension 03/05/2018   • Systolic congestive heart failure (HCC) 02/19/2018               History of Present Illness  Patient presents today for follow-up with a history of NSVT, dilated, nonischemic cardiomyopathy with dual-chamber ICD and chronic systolic heart failure.  Paroxysmal atrial fibrillation with recent left atrial appendage occlusion.  Patient developed atrial fibrillation was last seen by Dr. Sousa.  He was scheduled for cardioversion upon arrival he was converted to sinus rhythm.  He is now having frequent PVCs.  Is been symptomatic for the past week.  He is unsure what dose of sotalol he is taking he has a written down but is not sure what he is taking or not taking.  He is going to check with his pharmacy to check his dose.    No Known Allergies    Current Outpatient Medications   Medication Instructions   • aspirin 81 mg, Oral, Daily   • clopidogrel (PLAVIX) 75 mg, Oral, Daily   • dicyclomine (BENTYL) 10 mg, Oral, Daily   • fludrocortisone 0.1 mg, Oral, Daily   • FLUoxetine  "(PROZAC) 40 mg, Oral, Daily   • FLUoxetine (PROZAC) 20 mg, Oral, Daily   • furosemide (LASIX) 40 MG tablet TAKE 1 TABLET EVERY DAY   • gabapentin (NEURONTIN) 300 MG capsule 2 Times Daily - RT   • ibuprofen (ADVIL,MOTRIN) 800 mg, Oral, Every 6 Hours PRN, prn   • linaclotide (LINZESS) 72 mcg, Oral, Every Morning Before Breakfast   • meclizine (ANTIVERT) 25 MG tablet No dose, route, or frequency recorded.   • nitroglycerin (NITROSTAT) 0.4 MG SL tablet 1 under the tongue as needed for angina, may repeat q5mins for up three doses   • pantoprazole (PROTONIX) 40 MG EC tablet TAKE 1 TABLET EVERY DAY   • potassium chloride (K-DUR,KLOR-CON) 10 MEQ CR tablet TAKE 1 TABLET EVERY DAY   • ramipril (ALTACE) 2.5 MG capsule TAKE 1 CAPSULE EVERY DAY   • Rimegepant Sulfate (Nurtec) 75 MG tablet dispersible tablet Oral, As Needed   • simvastatin (ZOCOR) 20 MG tablet TAKE 1 TABLET EVERY NIGHT   • tamsulosin (FLOMAX) 0.4 MG capsule 24 hr capsule 1 capsule, Oral, Daily   • traMADol (ULTRAM) 50 MG tablet 1 tablet, Oral, 2 Times Daily PRN   • traZODone (DESYREL) 100 mg, Oral, Nightly       .    Objective:     /70 (BP Location: Left arm, Patient Position: Sitting)   Pulse 98   Ht 190.5 cm (75\")   Wt 100 kg (221 lb)   SpO2 98%   BMI 27.62 kg/m²    Body mass index is 27.62 kg/m².     Vitals reviewed.   Constitutional:       Appearance: Well-developed.   Pulmonary:      Effort: Pulmonary effort is normal. No respiratory distress.      Breath sounds: Normal breath sounds. No wheezing. No rales.      Comments: Bases clear  Chest:      Chest wall: Not tender to palpatation.   Cardiovascular:      Normal rate. Regular rhythm.      Murmurs: There is no murmur.      No gallop. No click. No rub.   Pulses:     Intact distal pulses.   Edema:     Peripheral edema absent.   Musculoskeletal: Normal range of motion.       Lab Review:      Results from last 7 days   Lab Units 04/27/23  1258   SODIUM mmol/L 139   POTASSIUM mmol/L 4.2   CHLORIDE " mmol/L 105   CO2 mmol/L 25.0   BUN mg/dL 12   CREATININE mg/dL 1.00   GLUCOSE mg/dL 107*   CALCIUM mg/dL 9.3     Results from last 7 days   Lab Units 04/27/23  1258   WBC 10*3/mm3 6.92   HEMOGLOBIN g/dL 15.0   HEMATOCRIT % 44.9   PLATELETS 10*3/mm3 277          TSH        10/5/2022    12:06 11/23/2022    09:50   TSH   TSH 1.210   2.610                 ECG 12 Lead    Date/Time: 5/1/2023 4:30 PM  Performed by: Cosme Vernon PA  Authorized by: Cosme Vernon PA   Comparison: compared with previous ECG from 4/28/2023  Similar to previous ECG  Rhythm: sinus rhythm  Ectopy: unifocal PVCs  Rate: normal  Conduction: conduction normal  ST Segments: ST segments normal  T Waves: T waves normal  QRS axis: normal    Clinical impression: non-specific ECG                       Assessment:      Diagnosis Plan   1. Nonischemic dilated cardiomyopathy (HCC)  Followed by Dr. Sousa.       2. AICD (automatic cardioverter/defibrillator) present   device interrogation stable..          3. Paroxysmal atrial fibrillation (HCC)   patient recently had breakthrough episode of A-fib.  He was admitted for cardioversion procedure but spontaneous converted on sinus rhythm.  He is unclear of what dose of sotalol has been taking he has been as high as 120 twice a day but he thinks is made his blood pressure low.  He is now taking 80 twice daily.      4. Nonsustained ventricular tachycardia (HCC)   frequent outflow tract PVCs on EKG today.      5. Essential hypertension   marginal blood pressure      6. Presence of Watchman left atrial appendage closure device   well-placed left atrial appendage occlusion device.  No indication for anticoagulation      7. Pulmonary HTN (HCC)   followed by general cardiology        Plan:   Patient is unclear what dose of sotalol he is on.  Reviewed with Dr. Downs.  Frequent PVCs maintaining sinus rhythm.  Would like to increase his sotalol as tolerated 120 mg twice daily.  Return for follow-up in 6 months  as scheduled.  Electronically signed by ROSEANNE Villar, 05/02/23, 9:06 AM EDT.

## 2023-05-01 NOTE — TELEPHONE ENCOUNTER
I spoke with scheduling. He has an appointment with Dr. Downs today at 3:15 pm. He will have his device interrogated as well.      Patient notified and aware.

## 2023-05-02 ENCOUNTER — TELEPHONE (OUTPATIENT)
Dept: CARDIOLOGY | Facility: CLINIC | Age: 58
End: 2023-05-02
Payer: MEDICARE

## 2023-05-02 RX ORDER — SOTALOL HYDROCHLORIDE 80 MG/1
120 TABLET ORAL 2 TIMES DAILY
Qty: 60 TABLET | Refills: 6 | Status: SHIPPED | OUTPATIENT
Start: 2023-05-02

## 2023-05-15 ENCOUNTER — TELEPHONE (OUTPATIENT)
Dept: CARDIOLOGY | Facility: CLINIC | Age: 58
End: 2023-05-15
Payer: MEDICARE

## 2023-05-15 NOTE — LETTER
May 22, 2023             To Whom It May Concern:    We build our practice on integrity and patient care. The highest compliment we can receive is the referral of friends, family and patients to our office. We want to take this time to thank you for considering our group as part of your care team.    The medical records for Bill Umana 1965 were reviewed for pre-operative clearance on 05/22/23. It has been determined that this patient has:  ACCEPTABLE RISK    Bill Umana is currently on the following medications that will need to be held prior to surgery: CONTINUE ASA,MAY HOLD PLAVIX 5 DAYS.    This pre-operative evaluation has been completed based on patient reported medical conditions at the date of this letter, this evaluation may have considered in part results of additional testing, completion of an EKG and patient reported symptoms at the time of their visit.    Bill Umana's pre-operative clearance is good for thirty(30) days from the date of this letter with no reported changes in health, symptoms or diagnosis from the patient, their primary care provider or your office.    Your office has reported the patient will under go FOR UPPER ENDOSCOPY AND COLONOSCOPY on 05/26/23 with Dr. LANGE. If this appointment is changed or moved outside of thirty(30) days from 05/22/23 this pre-operative clearance is void.    If you have any further questions please give our office a call.    Thank you for your trust,      ROSEANNE Soto

## 2023-05-15 NOTE — TELEPHONE ENCOUNTER
Received cardiac clearance request from  stating pt has EGD/colonoscopy scheduled for 05/26/2023 and is requiring a cardiac clearance. Placed cardiac clearance request in Mee's inbox to review and address with provider.

## 2023-05-23 ENCOUNTER — TELEPHONE (OUTPATIENT)
Dept: CARDIOLOGY | Facility: CLINIC | Age: 58
End: 2023-05-23

## 2023-05-23 NOTE — TELEPHONE ENCOUNTER
Patient is being considered for caudal epidural steroid injection with [facility]. They request to have patient hold Plavix 7 days prior to injection and resume Plavix 12 hours after injection.    Pt's LOV with Dr. Sousa 4/27/23, pt is also following with [facility]. Pt saw ROSEANNE Perrin 5/1/2023. Patient states that he is still having palpitations and his HR ranged from . Transferred pt to Dr. Downs's nurse.       Will await Dr. Downs's recommendations.

## 2023-05-24 ENCOUNTER — TELEPHONE (OUTPATIENT)
Dept: CARDIOLOGY | Facility: CLINIC | Age: 58
End: 2023-05-24

## 2023-05-24 ENCOUNTER — TELEPHONE (OUTPATIENT)
Dept: CARDIOLOGY | Facility: CLINIC | Age: 58
End: 2023-05-24
Payer: MEDICARE

## 2023-05-24 NOTE — TELEPHONE ENCOUNTER
Patient called and states that he continues to have palpitations most days. He states his HR ranges from 38 to 137. BP is around 111/70. He states it is better since starting the Sotalol 120mg BID however is still very present.  I updated him he would receive a call if further changes needed to be made.

## 2023-05-24 NOTE — TELEPHONE ENCOUNTER
Patient called and left a message. I tried to call him back but he did not answer. I left a message for him to call me back at the office.

## 2023-05-25 NOTE — TELEPHONE ENCOUNTER
Baseline creatinine of approximately 1 3-1 4 -> presented elevated at 2 06 -> improved to 1 89 today  Monitor renal function and urine output -> limit/avoid nephrotoxins and hypotension as possible -> holding HCTZ/ARB -> note chronic diuretic use in the setting of CHF  Monitor hemoglobin and transfuse if necessary in the setting of acute on chronic anemia  If creatinine declines/worsens, consider nephrology evaluation Insurance approved entresto from 06/28/19- 06/28/19.

## 2023-05-30 ENCOUNTER — HOSPITAL ENCOUNTER (OUTPATIENT)
Dept: SLEEP MEDICINE | Facility: HOSPITAL | Age: 58
End: 2023-05-30
Payer: MEDICARE

## 2023-05-30 PROCEDURE — 95811 POLYSOM 6/>YRS CPAP 4/> PARM: CPT

## 2023-05-30 NOTE — TELEPHONE ENCOUNTER
Based on his most recent JORGE L from March 2023 I believe the patient can stop Plavix and continue on aspirin 81 mg alone    Regarding palpitations he has frequent PVCs.  Is he currently taking sotalol 120 mg twice daily?    If he is having the palpitations despite that dose I would have him follow-up with EP for other recommendation

## 2023-05-30 NOTE — TELEPHONE ENCOUNTER
[facility] called again to know status of clearance for caudal epidural steroid injection. Dr. Downs has not given further recommendations at this time (see telephone encounter 5/24/23).     Called pt, no answer, LVM for return call.     Please advise of any recommendations at this time.

## 2023-05-31 ENCOUNTER — TELEPHONE (OUTPATIENT)
Dept: CARDIOLOGY | Facility: CLINIC | Age: 58
End: 2023-05-31

## 2023-05-31 NOTE — TELEPHONE ENCOUNTER
Called pt, no answer, LVM for return call.     Pt called back. Pt is still having palpitations despite taking sotalol 120 mg BID.     Advised pt of NSK recommendations to stop Plavix and continue ASA 81 mg daily. Pt verbalizes understanding and agreeable to plan.    Transferred pt to Dr. Downs's nurse to address pt's palpitations.

## 2023-06-20 PROBLEM — G47.33 OSA (OBSTRUCTIVE SLEEP APNEA): Status: ACTIVE | Noted: 2023-05-30

## 2023-07-27 ENCOUNTER — TELEPHONE (OUTPATIENT)
Dept: CARDIOLOGY | Facility: CLINIC | Age: 58
End: 2023-07-27
Payer: MEDICARE

## 2023-08-01 ENCOUNTER — OFFICE VISIT (OUTPATIENT)
Dept: CARDIOLOGY | Facility: CLINIC | Age: 58
End: 2023-08-01
Payer: MEDICARE

## 2023-08-01 ENCOUNTER — OFFICE VISIT (OUTPATIENT)
Dept: SLEEP MEDICINE | Facility: HOSPITAL | Age: 58
End: 2023-08-01
Payer: MEDICARE

## 2023-08-01 VITALS
HEIGHT: 75 IN | DIASTOLIC BLOOD PRESSURE: 60 MMHG | HEART RATE: 77 BPM | BODY MASS INDEX: 26.66 KG/M2 | WEIGHT: 214.4 LBS | OXYGEN SATURATION: 96 % | SYSTOLIC BLOOD PRESSURE: 116 MMHG

## 2023-08-01 VITALS
BODY MASS INDEX: 26.73 KG/M2 | HEIGHT: 75 IN | WEIGHT: 215 LBS | HEART RATE: 67 BPM | DIASTOLIC BLOOD PRESSURE: 68 MMHG | SYSTOLIC BLOOD PRESSURE: 112 MMHG | OXYGEN SATURATION: 97 %

## 2023-08-01 DIAGNOSIS — G47.33 OSA (OBSTRUCTIVE SLEEP APNEA): Primary | ICD-10-CM

## 2023-08-01 DIAGNOSIS — Z95.810 AICD (AUTOMATIC CARDIOVERTER/DEFIBRILLATOR) PRESENT: ICD-10-CM

## 2023-08-01 DIAGNOSIS — I42.0 NONISCHEMIC DILATED CARDIOMYOPATHY: Primary | ICD-10-CM

## 2023-08-01 DIAGNOSIS — I47.29 NONSUSTAINED VENTRICULAR TACHYCARDIA: ICD-10-CM

## 2023-08-01 DIAGNOSIS — I42.9 CARDIOMYOPATHY, UNSPECIFIED TYPE: ICD-10-CM

## 2023-08-01 PROCEDURE — 99214 OFFICE O/P EST MOD 30 MIN: CPT | Performed by: PHYSICIAN ASSISTANT

## 2023-08-01 PROCEDURE — 93283 PRGRMG EVAL IMPLANTABLE DFB: CPT | Performed by: PHYSICIAN ASSISTANT

## 2023-08-01 PROCEDURE — 3078F DIAST BP <80 MM HG: CPT | Performed by: PHYSICIAN ASSISTANT

## 2023-08-01 PROCEDURE — 3074F SYST BP LT 130 MM HG: CPT | Performed by: PHYSICIAN ASSISTANT

## 2023-08-01 RX ORDER — CARVEDILOL PHOSPHATE 20 MG/1
CAPSULE, EXTENDED RELEASE ORAL
COMMUNITY

## 2023-08-01 RX ORDER — MIDODRINE HYDROCHLORIDE 2.5 MG/1
TABLET ORAL
COMMUNITY

## 2023-08-01 RX ORDER — POTASSIUM CHLORIDE 750 MG/1
TABLET, FILM COATED, EXTENDED RELEASE ORAL EVERY 24 HOURS
COMMUNITY

## 2023-08-01 RX ORDER — TRAZODONE HYDROCHLORIDE 50 MG/1
50 TABLET ORAL NIGHTLY
COMMUNITY

## 2023-08-01 RX ORDER — ONDANSETRON 4 MG/1
TABLET, ORALLY DISINTEGRATING ORAL
COMMUNITY

## 2023-08-01 RX ORDER — HYDROCODONE BITARTRATE AND ACETAMINOPHEN 10; 325 MG/1; MG/1
TABLET ORAL
COMMUNITY

## 2023-08-01 RX ORDER — ARIPIPRAZOLE 2 MG/1
TABLET ORAL EVERY 24 HOURS
COMMUNITY
Start: 2023-07-31

## 2023-08-01 RX ORDER — BENZONATATE 200 MG/1
1 CAPSULE ORAL 2 TIMES DAILY PRN
COMMUNITY

## 2023-08-23 RX ORDER — SOTALOL HYDROCHLORIDE 80 MG/1
80 TABLET ORAL 2 TIMES DAILY
Qty: 270 TABLET | Refills: 2 | Status: SHIPPED | OUTPATIENT
Start: 2023-08-23

## 2023-10-18 RX ORDER — RAMIPRIL 2.5 MG/1
CAPSULE ORAL
Qty: 90 CAPSULE | Refills: 10 | Status: SHIPPED | OUTPATIENT
Start: 2023-10-18

## 2023-10-25 RX ORDER — FUROSEMIDE 40 MG/1
TABLET ORAL
Qty: 90 TABLET | Refills: 1 | Status: SHIPPED | OUTPATIENT
Start: 2023-10-25

## 2023-12-01 ENCOUNTER — TELEPHONE (OUTPATIENT)
Dept: CARDIOLOGY | Facility: CLINIC | Age: 58
End: 2023-12-01

## 2023-12-01 NOTE — TELEPHONE ENCOUNTER
Name: Bill Umana    Relationship: Self    Best Callback Number: [phone]    Incoming call to the Hub, requesting to  RESCHEDULE their Device Check appointment on 12.1.2023.     Per Hub workflow, further review is needed before the task can be completed.    Result of Call: Attempted warm transfer   PATIENT COVID POSITIVE

## 2023-12-27 ENCOUNTER — OFFICE VISIT (OUTPATIENT)
Dept: CARDIOLOGY | Facility: CLINIC | Age: 58
End: 2023-12-27
Payer: MEDICARE

## 2023-12-27 VITALS
HEART RATE: 85 BPM | OXYGEN SATURATION: 100 % | BODY MASS INDEX: 29.54 KG/M2 | SYSTOLIC BLOOD PRESSURE: 106 MMHG | HEIGHT: 75 IN | WEIGHT: 237.6 LBS | DIASTOLIC BLOOD PRESSURE: 64 MMHG

## 2023-12-27 DIAGNOSIS — I48.0 PAROXYSMAL ATRIAL FIBRILLATION: Primary | ICD-10-CM

## 2023-12-27 DIAGNOSIS — Z79.899 LONG TERM CURRENT USE OF ANTIARRHYTHMIC DRUG: ICD-10-CM

## 2023-12-27 DIAGNOSIS — I49.3 PVC (PREMATURE VENTRICULAR CONTRACTION): ICD-10-CM

## 2023-12-27 DIAGNOSIS — I42.0 NONISCHEMIC DILATED CARDIOMYOPATHY: ICD-10-CM

## 2023-12-27 DIAGNOSIS — Z95.818 PRESENCE OF WATCHMAN LEFT ATRIAL APPENDAGE CLOSURE DEVICE: ICD-10-CM

## 2023-12-27 DIAGNOSIS — Z95.810 AICD (AUTOMATIC CARDIOVERTER/DEFIBRILLATOR) PRESENT: ICD-10-CM

## 2023-12-27 DIAGNOSIS — I10 ESSENTIAL HYPERTENSION: ICD-10-CM

## 2023-12-27 PROBLEM — Z79.4 ENCOUNTER FOR LONG-TERM (CURRENT) USE OF INSULIN: Status: RESOLVED | Noted: 2022-09-20 | Resolved: 2023-12-27

## 2023-12-27 PROBLEM — M54.50 LOW BACK PAIN: Status: RESOLVED | Noted: 2022-08-25 | Resolved: 2023-12-27

## 2023-12-27 PROBLEM — M54.2 NECK PAIN: Status: RESOLVED | Noted: 2022-08-25 | Resolved: 2023-12-27

## 2023-12-27 PROBLEM — G43.909 MIGRAINE: Status: RESOLVED | Noted: 2023-04-27 | Resolved: 2023-12-27

## 2023-12-27 PROBLEM — R07.9 CHEST PAIN: Status: RESOLVED | Noted: 2023-01-05 | Resolved: 2023-12-27

## 2023-12-27 PROBLEM — M54.12 CERVICAL RADICULOPATHY: Status: RESOLVED | Noted: 2022-09-20 | Resolved: 2023-12-27

## 2023-12-27 RX ORDER — FLUOXETINE HYDROCHLORIDE 20 MG/1
20 CAPSULE ORAL DAILY
COMMUNITY

## 2023-12-27 RX ORDER — TRAMADOL HYDROCHLORIDE 50 MG/1
50 TABLET ORAL 2 TIMES DAILY
COMMUNITY
Start: 2023-12-05

## 2023-12-27 NOTE — PROGRESS NOTES
Encounter Date:12/27/2023      Patient ID: Bill Umana is a 58 y.o. male.    Kim Goff, RAFY    Cheif Complaint EP: Atrial Fibrillation, Nonischemic cardiomyopathy, and ICD Check    PROBLEM LIST:  Patient Active Problem List    Diagnosis Date Noted    Nonsustained ventricular tachycardia 03/23/2018     Priority: High     Note Last Updated: 11/29/2021     interrogation demonstrates 4 episodes of nonsustained ventricular tachycardia and one episode of ventricular tachycardia converted with ATP without defibrillation  patient on Sotalol for ventricular tachycardia followed by EP services      Paroxysmal atrial fibrillation 03/05/2018     Priority: High     Note Last Updated: 3/21/2022     LAAO 12-16-21  JORGE L, 2/9/2022: 24 mm watchman left atrial appendage occlusion device in place.  Device is well-seated with no peridevice flow.  No device associated thrombus.      Nonischemic dilated cardiomyopathy 02/19/2018     Priority: High     Note Last Updated: 3/21/2022     S/P AICD implant ([employer])   Cardiac catheterization March 2018 with normal coronaries and an EF estimated at 40-45%  EF by echocardiogram November 2019 at 35 to 40% with grade 1 diastolic dysfunction  Echocardiogram, 7/2/2021: Mild concentric LVH.  Moderate global hypokinesis.  EF 35-40%.  Diastolic dysfunction.  Left atrium moderately dilated.  Mild aortic valve sclerosis without stenosis.  JORGE L, 2/9/2022:  EF 26 to 30%.  LV systolic function severely decreased.      PVC (premature ventricular contraction) 12/27/2023     Priority: Medium    Chronic systolic heart failure 04/27/2023     Priority: Medium    Presence of Watchman left atrial appendage closure device 03/24/2023     Priority: Medium    Pulmonary HTN 03/24/2023     Priority: Medium     Note Last Updated: 3/24/2023     1. JORGE L 3-10-23:     Left ventricular systolic function is moderately decreased. Left ventricular ejection fraction appears to be 31 - 35%.    The left  ventricular cavity is mildly-moderately dilated    Moderate tricuspid valve regurgitation is present. Estimated right ventricular systolic pressure from tricuspid regurgitation is mildly elevated (35-45 mmHg).      Orthostatic hypotension 02/13/2019     Priority: Medium    AICD (automatic cardioverter/defibrillator) present 03/05/2018     Priority: Medium     Note Last Updated: 11/29/2021     S/P AICD implant ([employer])       Hyperlipidemia 03/05/2018     Priority: Low    Essential hypertension 03/05/2018     Priority: Low    LISA (obstructive sleep apnea) 05/30/2023    Depressive disorder 04/27/2023    Chronic pain disorder 09/20/2022    Lumbar post-laminectomy syndrome 09/20/2022    Bilateral carotid artery stenosis 11/29/2021     Note Last Updated: 11/29/2021     16 to 49% bilateral internal carotid artery stenosis by duplex November 2019      SDH (subdural hematoma) 11/29/2021     Note Last Updated: 11/29/2021     [facility] 7-23-21      Chronic fatigue 12/04/2019               History of Present Illness  Patient presents today for follow-up with a history of nonischemic cardiomyopathy with a dual-chamber ICD and paroxysmal atrial fibrillation with left atrial appendage occlusion device.  Returns today for scheduled electrophysiology follow-up.  He is complaining of ongoing dyspnea on exertion most notably with stairs as well as orthopnea.  He has had syncope and presyncope.  Last syncopal episode was about 3 weeks ago.  He reports home blood pressures in the 90s.  He states compliance with his current medical regimen.  Denies lower extremity edema.    No Known Allergies    Current Outpatient Medications   Medication Instructions    albuterol sulfate  (90 Base) MCG/ACT inhaler 2 puffs, Inhalation, Every 4 Hours PRN    aspirin 81 mg, Oral, Daily    carvedilol CR (COREG CR) 20 MG 24 hr capsule     FLUoxetine (PROZAC) 40 mg, Oral, Daily    FLUoxetine (PROZAC) 20 mg, Oral, Daily    furosemide (LASIX) 40 MG  "tablet TAKE 1 TABLET EVERY DAY    gabapentin (NEURONTIN) 300 MG capsule 2 Times Daily - RT    ibuprofen (ADVIL,MOTRIN) 800 mg, Oral, Every 6 Hours PRN, prn    linaclotide (LINZESS) 72 mcg, Oral, Every Morning Before Breakfast    meclizine (ANTIVERT) 25 mg, 3 Times Daily PRN    methocarbamol (ROBAXIN) 500 mg, Oral, Every 8 Hours    nitroglycerin (NITROSTAT) 0.4 MG SL tablet 1 under the tongue as needed for angina, may repeat q5mins for up three doses    pantoprazole (PROTONIX) 40 MG EC tablet TAKE 1 TABLET EVERY DAY    potassium chloride (K-DUR,KLOR-CON) 10 MEQ CR tablet TAKE 1 TABLET EVERY DAY    potassium chloride 10 MEQ CR tablet Every 24 Hours    ramipril (ALTACE) 2.5 MG capsule TAKE 1 CAPSULE EVERY DAY    riFAXIMin (XIFAXAN) 550 mg, Oral, Every 8 Hours Scheduled    Rimegepant Sulfate (Nurtec) 75 MG tablet dispersible tablet Oral, As Needed    simvastatin (ZOCOR) 20 MG tablet TAKE 1 TABLET EVERY NIGHT    sotalol (BETAPACE) 80 mg, Oral, 2 Times Daily    traMADol (ULTRAM) 50 mg, Oral, 2 Times Daily    traZODone (DESYREL) 100 mg, Oral, Nightly       .    Objective:     /64 (BP Location: Right arm, Patient Position: Sitting)   Pulse 85   Ht 190.5 cm (75\")   Wt 108 kg (237 lb 9.6 oz)   SpO2 100%   BMI 29.70 kg/m²    Body mass index is 29.7 kg/m².     Constitutional:       Appearance: Well-developed.   Pulmonary:      Effort: Pulmonary effort is normal. No respiratory distress.      Breath sounds: Normal breath sounds. No wheezing. No rales.      Comments: Bases clear  Chest:      Chest wall: Not tender to palpatation.   Cardiovascular:      Normal rate. Occasional ectopic beats. Regular rhythm.      Murmurs: There is no murmur.      No gallop.  No click. No rub.   Pulses:     Intact distal pulses.   Edema:     Peripheral edema absent.   Musculoskeletal: Normal range of motion.       Lab Review:     Lab Results   Component Value Date    GLUCOSE 107 (H) 04/27/2023    BUN 12 04/27/2023    CREATININE 1.00 " 04/27/2023    EGFRRESULT 84 11/23/2022    EGFR 86.7 04/27/2023    BCR 12.0 04/27/2023    K 4.2 04/27/2023    CO2 25.0 04/27/2023    CALCIUM 9.3 04/27/2023    ALBUMIN 3.6 06/26/2021    BILITOT 0.4 06/26/2021    AST 24 06/26/2021    ALT 32 06/26/2021     Lab Results   Component Value Date    WBC 6.92 04/27/2023    HGB 15.0 04/27/2023    HCT 44.9 04/27/2023    MCV 88.4 04/27/2023     04/27/2023     Lab Results   Component Value Date    TSH 2.610 11/23/2022             ECG 12 Lead    Date/Time: 12/27/2023 9:35 AM  Performed by: Cosme Weaver PA    Authorized by: Cosme Weaver PA  Rhythm: sinus rhythm  Ectopy: multifocal PVCs  Rate: normal  BPM: 85  Conduction: conduction normal  ST Segments: ST segments normal    Clinical impression: abnormal EKG                     Assessment:      Diagnosis Plan   1. Paroxysmal atrial fibrillation  Less than 1% mode switches noted on device interrogation      2. AICD (automatic cardioverter/defibrillator) present    This patient's Cardiac Implanted Electronic Device was manually interrogated and reprogrammed during the patient encounter today.  Iterative programming changes were manually made to determine the sensing threshold, pacing threshold, lead impedance as well as underlying cardiac rhythm.  These programming changes were not limited to but included some or all of the following when appropriate: pacing mode, programmed AV delays, blanking periods, and refractory periods.  Data obtained as a result of these manual programing changes informed the patient's CIED permanent programming.    6% right atrial pacing and 2% RV pacing.  Normal lead parameters.  4 months to NOEL.      3. Presence of Watchman left atrial appendage closure device  Watchman in situ.  Continue low-dose aspirin      4. Nonischemic dilated cardiomyopathy  GDMT      5. Essential hypertension  Has been running low contributing to syncope and presyncope.  Discontinue carvedilol CR   6.        frequent PVCs                                                               Plan:     Stable cardiac status.  Continue current medications.   in 2 months, sooner as needed.  Thank you for allowing us to participate in the care of your patient.     Electronically signed by ROSEANNE Lindo, 12/27/23, 10:00 AM EST.

## 2024-01-06 DIAGNOSIS — E78.2 MIXED HYPERLIPIDEMIA: ICD-10-CM

## 2024-01-08 RX ORDER — SIMVASTATIN 20 MG
TABLET ORAL
Qty: 90 TABLET | Refills: 3 | Status: SHIPPED | OUTPATIENT
Start: 2024-01-08

## 2024-02-26 ENCOUNTER — OFFICE VISIT (OUTPATIENT)
Dept: CARDIOLOGY | Facility: CLINIC | Age: 59
End: 2024-02-26
Payer: MEDICARE

## 2024-02-26 VITALS
HEART RATE: 82 BPM | HEIGHT: 75 IN | WEIGHT: 227 LBS | OXYGEN SATURATION: 95 % | DIASTOLIC BLOOD PRESSURE: 64 MMHG | BODY MASS INDEX: 28.23 KG/M2 | SYSTOLIC BLOOD PRESSURE: 108 MMHG

## 2024-02-26 DIAGNOSIS — I48.0 PAROXYSMAL ATRIAL FIBRILLATION: Primary | ICD-10-CM

## 2024-02-26 DIAGNOSIS — I49.3 PVC (PREMATURE VENTRICULAR CONTRACTION): ICD-10-CM

## 2024-02-26 DIAGNOSIS — Z45.02 ELECTIVE REPLACEMENT INDICATED FOR IMPLANTABLE CARDIOVERTER-DEFIBRILLATOR (ICD): ICD-10-CM

## 2024-02-26 DIAGNOSIS — I10 ESSENTIAL HYPERTENSION: ICD-10-CM

## 2024-02-26 DIAGNOSIS — Z95.810 ELECTIVE REPLACEMENT OF IMPLANTABLE CARDIOVERTER-DEFIBRILLATOR (ICD) BATTERY REQUIRED: ICD-10-CM

## 2024-02-26 DIAGNOSIS — I42.0 NONISCHEMIC DILATED CARDIOMYOPATHY: ICD-10-CM

## 2024-02-26 DIAGNOSIS — I95.1 ORTHOSTATIC HYPOTENSION: ICD-10-CM

## 2024-02-26 DIAGNOSIS — I47.29 NONSUSTAINED VENTRICULAR TACHYCARDIA: ICD-10-CM

## 2024-02-26 DIAGNOSIS — Z95.810 AICD (AUTOMATIC CARDIOVERTER/DEFIBRILLATOR) PRESENT: ICD-10-CM

## 2024-02-26 DIAGNOSIS — Z95.818 PRESENCE OF WATCHMAN LEFT ATRIAL APPENDAGE CLOSURE DEVICE: ICD-10-CM

## 2024-02-26 DIAGNOSIS — Z79.899 LONG TERM CURRENT USE OF ANTIARRHYTHMIC DRUG: ICD-10-CM

## 2024-02-26 DIAGNOSIS — G47.33 OSA (OBSTRUCTIVE SLEEP APNEA): ICD-10-CM

## 2024-02-26 PROCEDURE — 93283 PRGRMG EVAL IMPLANTABLE DFB: CPT | Performed by: PHYSICIAN ASSISTANT

## 2024-02-26 PROCEDURE — 99214 OFFICE O/P EST MOD 30 MIN: CPT | Performed by: PHYSICIAN ASSISTANT

## 2024-02-26 PROCEDURE — 3078F DIAST BP <80 MM HG: CPT | Performed by: PHYSICIAN ASSISTANT

## 2024-02-26 PROCEDURE — 3074F SYST BP LT 130 MM HG: CPT | Performed by: PHYSICIAN ASSISTANT

## 2024-02-26 RX ORDER — SOTALOL HYDROCHLORIDE 120 MG/1
120 TABLET ORAL 2 TIMES DAILY
Qty: 30 TABLET | Refills: 5 | Status: SHIPPED | OUTPATIENT
Start: 2024-02-26

## 2024-02-26 RX ORDER — OXYCODONE HYDROCHLORIDE AND ACETAMINOPHEN 5; 325 MG/1; MG/1
1 TABLET ORAL EVERY 4 HOURS PRN
COMMUNITY
Start: 2024-01-12

## 2024-02-26 NOTE — PROGRESS NOTES
Encounter Date:02/26/2024      Patient ID: Bill Umana is a 58 y.o. male.    Kim Goff, RAFY    Cheif Complaint EP: Atrial Fibrillation, VT, Cardiomyopathy, and ICD Check    PROBLEM LIST:  Patient Active Problem List    Diagnosis Date Noted    Nonsustained ventricular tachycardia 03/23/2018     Priority: High     Note Last Updated: 11/29/2021     interrogation demonstrates 4 episodes of nonsustained ventricular tachycardia and one episode of ventricular tachycardia converted with ATP without defibrillation  patient on Sotalol for ventricular tachycardia followed by EP services      Paroxysmal atrial fibrillation 03/05/2018     Priority: High     Note Last Updated: 3/21/2022     LAAO 12-16-21  JORGE L, 2/9/2022: 24 mm watchman left atrial appendage occlusion device in place.  Device is well-seated with no peridevice flow.  No device associated thrombus.      Nonischemic dilated cardiomyopathy 02/19/2018     Priority: High     Note Last Updated: 2/26/2024     S/P AICD implant ([employer])   Cardiac catheterization March 2018 with normal coronaries and an EF estimated at 40-45%  EF by echocardiogram November 2019 at 35 to 40% with grade 1 diastolic dysfunction  Echocardiogram, 7/2/2021: Mild concentric LVH.  Moderate global hypokinesis.  EF 35-40%.  Diastolic dysfunction.  Left atrium moderately dilated.  Mild aortic valve sclerosis without stenosis.  JORGE L, 2/9/2022:  EF 26 to 30%.  LV systolic function severely decreased.  JORGE L, 3-10-23: Left ventricular systolic function is moderately decreased. Left ventricular ejection fraction appears to be 31 - 35%.       PVC (premature ventricular contraction) 12/27/2023     Priority: Medium    Chronic systolic heart failure 04/27/2023     Priority: Medium    Presence of Watchman left atrial appendage closure device 03/24/2023     Priority: Medium    Pulmonary HTN 03/24/2023     Priority: Medium     Note Last Updated: 3/24/2023     1. JORGE L 3-10-23:     Left  ventricular systolic function is moderately decreased. Left ventricular ejection fraction appears to be 31 - 35%.    The left ventricular cavity is mildly-moderately dilated    Moderate tricuspid valve regurgitation is present. Estimated right ventricular systolic pressure from tricuspid regurgitation is mildly elevated (35-45 mmHg).      Orthostatic hypotension 02/13/2019     Priority: Medium    AICD (automatic cardioverter/defibrillator) present 03/05/2018     Priority: Medium     Note Last Updated: 11/29/2021     S/P AICD implant ([employer])       Long term current use of antiarrhythmic drug 02/26/2024     Priority: Low    LISA (obstructive sleep apnea) 05/30/2023     Priority: Low    Hyperlipidemia 03/05/2018     Priority: Low    Essential hypertension 03/05/2018     Priority: Low    Depressive disorder 04/27/2023    Chronic pain disorder 09/20/2022    Lumbar post-laminectomy syndrome 09/20/2022    Bilateral carotid artery stenosis 11/29/2021     Note Last Updated: 11/29/2021     16 to 49% bilateral internal carotid artery stenosis by duplex November 2019      SDH (subdural hematoma) 11/29/2021     Note Last Updated: 11/29/2021     [facility] 7-23-21      Chronic fatigue 12/04/2019               History of Present Illness  Patient presents today for follow-up with a history of non-sustained VT, paroxysmal A-fib, AICD, Watchman device, orthostatic hypotension, LISA.  He presents today with continued palpitations, shortness of breath, lightheadedness.  Patient states that he had an episode of LOC 3 months ago.  Patient still experiences orthostatic hypotension with a BP normally running between 90 and 105 systolic.  Patient's ICD battery went into NOEL February 7, 2024.  He states compliance with his current medical regimen.    No Known Allergies    Current Outpatient Medications   Medication Instructions    albuterol sulfate  (90 Base) MCG/ACT inhaler 2 puffs, Inhalation, Every 4 Hours PRN    aspirin 81 mg,  "Oral, Daily    FLUoxetine (PROZAC) 40 mg, Oral, Daily    FLUoxetine (PROZAC) 20 mg, Oral, Daily    furosemide (LASIX) 40 MG tablet TAKE 1 TABLET EVERY DAY    gabapentin (NEURONTIN) 300 MG capsule 2 Times Daily - RT    ibuprofen (ADVIL,MOTRIN) 800 mg, Oral, Every 6 Hours PRN, prn    linaclotide (LINZESS) 72 mcg, Oral, Every Morning Before Breakfast    meclizine (ANTIVERT) 25 mg, 3 Times Daily PRN    methocarbamol (ROBAXIN) 500 mg, Oral, Every 8 Hours    nitroglycerin (NITROSTAT) 0.4 MG SL tablet 1 under the tongue as needed for angina, may repeat q5mins for up three doses    oxyCODONE-acetaminophen (PERCOCET) 5-325 MG per tablet 1 tablet, Oral, Every 4 Hours PRN    pantoprazole (PROTONIX) 40 MG EC tablet TAKE 1 TABLET EVERY DAY    potassium chloride (K-DUR,KLOR-CON) 10 MEQ CR tablet TAKE 1 TABLET EVERY DAY    potassium chloride 10 MEQ CR tablet Every 24 Hours    ramipril (ALTACE) 2.5 MG capsule TAKE 1 CAPSULE EVERY DAY    riFAXIMin (XIFAXAN) 550 mg, Oral, Every 8 Hours Scheduled    Rimegepant Sulfate (Nurtec) 75 MG tablet dispersible tablet Oral, As Needed    simvastatin (ZOCOR) 20 MG tablet TAKE 1 TABLET EVERY NIGHT    sotalol (BETAPACE) 80 mg, Oral, 2 Times Daily    traMADol (ULTRAM) 50 mg, Oral, 2 Times Daily    traZODone (DESYREL) 100 mg, Oral, Nightly       .    Objective:     /64 (BP Location: Left arm, Patient Position: Sitting)   Pulse 82   Ht 190.5 cm (75\")   Wt 103 kg (227 lb)   SpO2 95%   BMI 28.37 kg/m²    Body mass index is 28.37 kg/m².     Constitutional:       Appearance: Well-developed.   Pulmonary:      Effort: Pulmonary effort is normal. No respiratory distress.      Breath sounds: Normal breath sounds. No wheezing. No rales.      Comments: Bases clear  Chest:      Chest wall: Not tender to palpatation.   Cardiovascular:      Normal rate. Regular rhythm.      Murmurs: There is no murmur.      No gallop.  No click. No rub.   Pulses:     Intact distal pulses.   Edema:     Peripheral edema " absent.   Musculoskeletal: Normal range of motion.       Lab Review:     Lab Results   Component Value Date    GLUCOSE 107 (H) 04/27/2023    BUN 12 04/27/2023    CREATININE 1.00 04/27/2023    EGFRRESULT 84 11/23/2022    EGFR 86.7 04/27/2023    BCR 12.0 04/27/2023    K 4.2 04/27/2023    CO2 25.0 04/27/2023    CALCIUM 9.3 04/27/2023    ALBUMIN 3.6 06/26/2021    BILITOT 0.4 06/26/2021    AST 24 06/26/2021    ALT 32 06/26/2021     Lab Results   Component Value Date    WBC 6.92 04/27/2023    HGB 15.0 04/27/2023    HCT 44.9 04/27/2023    MCV 88.4 04/27/2023     04/27/2023     Lab Results   Component Value Date    TSH 2.610 11/23/2022           Procedures               Assessment:      Diagnosis Plan   1. Paroxysmal atrial fibrillation  0.7 days of atrial fibrillation noted on device interrogation      2. Nonsustained ventricular tachycardia  Multiple NSVT's on device interrogation      3. Nonischemic dilated cardiomyopathy  Patient has not been tolerant to GDMT secondary to orthostasis      4. AICD (automatic cardioverter/defibrillator) present  Normal functioning dual-chamber ICD with 6% right atrial and 2% RV pacing.  Normal lead parameters.  Device is NOEL since February 7, 2024.    Will schedule elective ICD generator change.  No medications to hold.      5. Presence of Watchman left atrial appendage closure device  Watchman in situ.  No indication for complete anticoagulation      6. Essential hypertension  Discontinue ramipril secondary to orthostasis with marginal systolic blood pressures      7. Long term current use of antiarrhythmic drug  Increase sotalol to 120 mg twice daily.  Will check an EKG in 3 days   8.     PVCs                                                                       increased sotalol to 120 mg twice daily.  PVC burden at approximately 20%.  Will continue to follow and possibly schedule for PVC ablation in the future.    9.     Autonomic dysfunction                                             referral to [place] [facility] dysfunction [facility]     Plan:     Stable cardiac status.  Continue current medications.   Thank you for allowing us to participate in the care of your patient.     Electronically signed by ROSEANNE Lindo, 02/26/24, 4:08 PM EST.

## 2024-02-26 NOTE — H&P (VIEW-ONLY)
Encounter Date:02/26/2024      Patient ID: Bill Umana is a 58 y.o. male.    Kim Goff, RAFY    Cheif Complaint EP: Atrial Fibrillation, VT, Cardiomyopathy, and ICD Check    PROBLEM LIST:  Patient Active Problem List    Diagnosis Date Noted    Nonsustained ventricular tachycardia 03/23/2018     Priority: High     Note Last Updated: 11/29/2021     interrogation demonstrates 4 episodes of nonsustained ventricular tachycardia and one episode of ventricular tachycardia converted with ATP without defibrillation  patient on Sotalol for ventricular tachycardia followed by EP services      Paroxysmal atrial fibrillation 03/05/2018     Priority: High     Note Last Updated: 3/21/2022     LAAO 12-16-21  JORGE L, 2/9/2022: 24 mm watchman left atrial appendage occlusion device in place.  Device is well-seated with no peridevice flow.  No device associated thrombus.      Nonischemic dilated cardiomyopathy 02/19/2018     Priority: High     Note Last Updated: 2/26/2024     S/P AICD implant ([employer])   Cardiac catheterization March 2018 with normal coronaries and an EF estimated at 40-45%  EF by echocardiogram November 2019 at 35 to 40% with grade 1 diastolic dysfunction  Echocardiogram, 7/2/2021: Mild concentric LVH.  Moderate global hypokinesis.  EF 35-40%.  Diastolic dysfunction.  Left atrium moderately dilated.  Mild aortic valve sclerosis without stenosis.  JORGE L, 2/9/2022:  EF 26 to 30%.  LV systolic function severely decreased.  JORGE L, 3-10-23: Left ventricular systolic function is moderately decreased. Left ventricular ejection fraction appears to be 31 - 35%.       PVC (premature ventricular contraction) 12/27/2023     Priority: Medium    Chronic systolic heart failure 04/27/2023     Priority: Medium    Presence of Watchman left atrial appendage closure device 03/24/2023     Priority: Medium    Pulmonary HTN 03/24/2023     Priority: Medium     Note Last Updated: 3/24/2023     1. JORGE L 3-10-23:     Left  ventricular systolic function is moderately decreased. Left ventricular ejection fraction appears to be 31 - 35%.    The left ventricular cavity is mildly-moderately dilated    Moderate tricuspid valve regurgitation is present. Estimated right ventricular systolic pressure from tricuspid regurgitation is mildly elevated (35-45 mmHg).      Orthostatic hypotension 02/13/2019     Priority: Medium    AICD (automatic cardioverter/defibrillator) present 03/05/2018     Priority: Medium     Note Last Updated: 11/29/2021     S/P AICD implant ([employer])       Long term current use of antiarrhythmic drug 02/26/2024     Priority: Low    LISA (obstructive sleep apnea) 05/30/2023     Priority: Low    Hyperlipidemia 03/05/2018     Priority: Low    Essential hypertension 03/05/2018     Priority: Low    Depressive disorder 04/27/2023    Chronic pain disorder 09/20/2022    Lumbar post-laminectomy syndrome 09/20/2022    Bilateral carotid artery stenosis 11/29/2021     Note Last Updated: 11/29/2021     16 to 49% bilateral internal carotid artery stenosis by duplex November 2019      SDH (subdural hematoma) 11/29/2021     Note Last Updated: 11/29/2021     [facility] 7-23-21      Chronic fatigue 12/04/2019               History of Present Illness  Patient presents today for follow-up with a history of non-sustained VT, paroxysmal A-fib, AICD, Watchman device, orthostatic hypotension, LISA.  He presents today with continued palpitations, shortness of breath, lightheadedness.  Patient states that he had an episode of LOC 3 months ago.  Patient still experiences orthostatic hypotension with a BP normally running between 90 and 105 systolic.  Patient's ICD battery went into NOEL February 7, 2024.  He states compliance with his current medical regimen.    No Known Allergies    Current Outpatient Medications   Medication Instructions    albuterol sulfate  (90 Base) MCG/ACT inhaler 2 puffs, Inhalation, Every 4 Hours PRN    aspirin 81 mg,  "Oral, Daily    FLUoxetine (PROZAC) 40 mg, Oral, Daily    FLUoxetine (PROZAC) 20 mg, Oral, Daily    furosemide (LASIX) 40 MG tablet TAKE 1 TABLET EVERY DAY    gabapentin (NEURONTIN) 300 MG capsule 2 Times Daily - RT    ibuprofen (ADVIL,MOTRIN) 800 mg, Oral, Every 6 Hours PRN, prn    linaclotide (LINZESS) 72 mcg, Oral, Every Morning Before Breakfast    meclizine (ANTIVERT) 25 mg, 3 Times Daily PRN    methocarbamol (ROBAXIN) 500 mg, Oral, Every 8 Hours    nitroglycerin (NITROSTAT) 0.4 MG SL tablet 1 under the tongue as needed for angina, may repeat q5mins for up three doses    oxyCODONE-acetaminophen (PERCOCET) 5-325 MG per tablet 1 tablet, Oral, Every 4 Hours PRN    pantoprazole (PROTONIX) 40 MG EC tablet TAKE 1 TABLET EVERY DAY    potassium chloride (K-DUR,KLOR-CON) 10 MEQ CR tablet TAKE 1 TABLET EVERY DAY    potassium chloride 10 MEQ CR tablet Every 24 Hours    ramipril (ALTACE) 2.5 MG capsule TAKE 1 CAPSULE EVERY DAY    riFAXIMin (XIFAXAN) 550 mg, Oral, Every 8 Hours Scheduled    Rimegepant Sulfate (Nurtec) 75 MG tablet dispersible tablet Oral, As Needed    simvastatin (ZOCOR) 20 MG tablet TAKE 1 TABLET EVERY NIGHT    sotalol (BETAPACE) 80 mg, Oral, 2 Times Daily    traMADol (ULTRAM) 50 mg, Oral, 2 Times Daily    traZODone (DESYREL) 100 mg, Oral, Nightly       .    Objective:     /64 (BP Location: Left arm, Patient Position: Sitting)   Pulse 82   Ht 190.5 cm (75\")   Wt 103 kg (227 lb)   SpO2 95%   BMI 28.37 kg/m²    Body mass index is 28.37 kg/m².     Constitutional:       Appearance: Well-developed.   Pulmonary:      Effort: Pulmonary effort is normal. No respiratory distress.      Breath sounds: Normal breath sounds. No wheezing. No rales.      Comments: Bases clear  Chest:      Chest wall: Not tender to palpatation.   Cardiovascular:      Normal rate. Regular rhythm.      Murmurs: There is no murmur.      No gallop.  No click. No rub.   Pulses:     Intact distal pulses.   Edema:     Peripheral edema " absent.   Musculoskeletal: Normal range of motion.       Lab Review:     Lab Results   Component Value Date    GLUCOSE 107 (H) 04/27/2023    BUN 12 04/27/2023    CREATININE 1.00 04/27/2023    EGFRRESULT 84 11/23/2022    EGFR 86.7 04/27/2023    BCR 12.0 04/27/2023    K 4.2 04/27/2023    CO2 25.0 04/27/2023    CALCIUM 9.3 04/27/2023    ALBUMIN 3.6 06/26/2021    BILITOT 0.4 06/26/2021    AST 24 06/26/2021    ALT 32 06/26/2021     Lab Results   Component Value Date    WBC 6.92 04/27/2023    HGB 15.0 04/27/2023    HCT 44.9 04/27/2023    MCV 88.4 04/27/2023     04/27/2023     Lab Results   Component Value Date    TSH 2.610 11/23/2022           Procedures               Assessment:      Diagnosis Plan   1. Paroxysmal atrial fibrillation  0.7 days of atrial fibrillation noted on device interrogation      2. Nonsustained ventricular tachycardia  Multiple NSVT's on device interrogation      3. Nonischemic dilated cardiomyopathy  Patient has not been tolerant to GDMT secondary to orthostasis      4. AICD (automatic cardioverter/defibrillator) present  Normal functioning dual-chamber ICD with 6% right atrial and 2% RV pacing.  Normal lead parameters.  Device is NOEL since February 7, 2024.    Will schedule elective ICD generator change.  No medications to hold.      5. Presence of Watchman left atrial appendage closure device  Watchman in situ.  No indication for complete anticoagulation      6. Essential hypertension  Discontinue ramipril secondary to orthostasis with marginal systolic blood pressures      7. Long term current use of antiarrhythmic drug  Increase sotalol to 120 mg twice daily.  Will check an EKG in 3 days   8.     PVCs                                                                       increased sotalol to 120 mg twice daily.  PVC burden at approximately 20%.  Will continue to follow and possibly schedule for PVC ablation in the future.    9.     Autonomic dysfunction                                             referral to [place] [facility]     Plan:     Stable cardiac status.  Continue current medications.   Thank you for allowing us to participate in the care of your patient.     Electronically signed by ROSEANNE Lindo, 02/26/24, 4:08 PM EST.

## 2024-02-27 ENCOUNTER — PATIENT ROUNDING (BHMG ONLY) (OUTPATIENT)
Dept: CARDIOLOGY | Facility: CLINIC | Age: 59
End: 2024-02-27

## 2024-02-27 NOTE — PROGRESS NOTES
February 27, 2024    Hello, may I speak with Bill Umana?       I am  with [facility]  [address]  [address]  [facility] [address] [phone].    Before we get started may I verify your date of birth? 1965    I am calling to officially welcome you to our practice and ask about your recent visit. Is this a good time to talk? yes    Tell me about your visit with us. What things went well?  EVERYTHING       We're always looking for ways to make our patients' experiences even better. Do you have recommendations on ways we may improve?  no    Overall were you satisfied with your first visit to our practice? yes       I appreciate you taking the time to speak with me today. Is there anything else I can do for you? no      Thank you, and have a great day.

## 2024-02-28 ENCOUNTER — PREP FOR SURGERY (OUTPATIENT)
Dept: OTHER | Facility: HOSPITAL | Age: 59
End: 2024-02-28
Payer: MEDICARE

## 2024-02-28 DIAGNOSIS — Z45.02 ICD (IMPLANTABLE CARDIOVERTER-DEFIBRILLATOR) BATTERY DEPLETION: Primary | ICD-10-CM

## 2024-02-28 RX ORDER — ONDANSETRON 2 MG/ML
4 INJECTION INTRAMUSCULAR; INTRAVENOUS EVERY 6 HOURS PRN
OUTPATIENT
Start: 2024-02-28

## 2024-02-28 RX ORDER — SODIUM CHLORIDE 9 MG/ML
40 INJECTION, SOLUTION INTRAVENOUS AS NEEDED
OUTPATIENT
Start: 2024-02-28

## 2024-02-28 RX ORDER — SODIUM CHLORIDE 0.9 % (FLUSH) 0.9 %
10 SYRINGE (ML) INJECTION AS NEEDED
OUTPATIENT
Start: 2024-02-28

## 2024-02-28 RX ORDER — NITROGLYCERIN 0.4 MG/1
0.4 TABLET SUBLINGUAL
OUTPATIENT
Start: 2024-02-28

## 2024-02-28 RX ORDER — SODIUM CHLORIDE 0.9 % (FLUSH) 0.9 %
3 SYRINGE (ML) INJECTION EVERY 12 HOURS SCHEDULED
OUTPATIENT
Start: 2024-02-28

## 2024-02-28 RX ORDER — ACETAMINOPHEN 325 MG/1
650 TABLET ORAL EVERY 4 HOURS PRN
OUTPATIENT
Start: 2024-02-28

## 2024-02-28 RX ORDER — CEFAZOLIN SODIUM 2 G/100ML
2 INJECTION, SOLUTION INTRAVENOUS ONCE
OUTPATIENT
Start: 2024-02-28 | End: 2024-02-28

## 2024-03-15 ENCOUNTER — TELEPHONE (OUTPATIENT)
Dept: CARDIOLOGY | Facility: CLINIC | Age: 59
End: 2024-03-15
Payer: MEDICARE

## 2024-03-15 NOTE — TELEPHONE ENCOUNTER
Called Mr Umana and he doesn't have a home monitor to get a reading to assess for PVCs.  He is setup for a generator change on 3/19/24 and will discuss with Dr Downs at that time.  If gets worse over the weekend he will go to the ER.

## 2024-03-19 ENCOUNTER — HOSPITAL ENCOUNTER (OUTPATIENT)
Facility: HOSPITAL | Age: 59
Discharge: HOME | End: 2024-03-19
Attending: INTERNAL MEDICINE | Admitting: INTERNAL MEDICINE
Payer: MEDICARE

## 2024-03-19 VITALS
HEIGHT: 75 IN | RESPIRATION RATE: 10 BRPM | SYSTOLIC BLOOD PRESSURE: 141 MMHG | WEIGHT: 225.09 LBS | HEART RATE: 66 BPM | DIASTOLIC BLOOD PRESSURE: 101 MMHG | BODY MASS INDEX: 27.99 KG/M2 | OXYGEN SATURATION: 95 %

## 2024-03-19 DIAGNOSIS — Z45.02 ICD (IMPLANTABLE CARDIOVERTER-DEFIBRILLATOR) BATTERY DEPLETION: ICD-10-CM

## 2024-03-19 DIAGNOSIS — Z45.02 ELECTIVE REPLACEMENT INDICATED FOR IMPLANTABLE CARDIOVERTER-DEFIBRILLATOR (ICD): ICD-10-CM

## 2024-03-19 PROBLEM — I25.5 ISCHEMIC CARDIOMYOPATHY: Status: ACTIVE | Noted: 2024-03-19

## 2024-03-19 LAB
ANION GAP SERPL CALCULATED.3IONS-SCNC: 11 MMOL/L (ref 5–15)
BUN SERPL-MCNC: 10 MG/DL (ref 6–20)
BUN/CREAT SERPL: 10.6 (ref 7–25)
CALCIUM SPEC-SCNC: 9 MG/DL (ref 8.6–10.5)
CHLORIDE SERPL-SCNC: 105 MMOL/L (ref 98–107)
CO2 SERPL-SCNC: 24 MMOL/L (ref 22–29)
CREAT SERPL-MCNC: 0.94 MG/DL (ref 0.76–1.27)
DEPRECATED RDW RBC AUTO: 41.9 FL (ref 37–54)
EGFRCR SERPLBLD CKD-EPI 2021: 93.4 ML/MIN/1.73
ERYTHROCYTE [DISTWIDTH] IN BLOOD BY AUTOMATED COUNT: 13 % (ref 12.3–15.4)
GLUCOSE SERPL-MCNC: 99 MG/DL (ref 65–99)
HCT VFR BLD AUTO: 45 % (ref 37.5–51)
HGB BLD-MCNC: 15 G/DL (ref 13–17.7)
MCH RBC QN AUTO: 29.2 PG (ref 26.6–33)
MCHC RBC AUTO-ENTMCNC: 33.3 G/DL (ref 31.5–35.7)
MCV RBC AUTO: 87.7 FL (ref 79–97)
PLATELET # BLD AUTO: 214 10*3/MM3 (ref 140–450)
PMV BLD AUTO: 11.2 FL (ref 6–12)
POTASSIUM SERPL-SCNC: 3.9 MMOL/L (ref 3.5–5.2)
RBC # BLD AUTO: 5.13 10*6/MM3 (ref 4.14–5.8)
SODIUM SERPL-SCNC: 140 MMOL/L (ref 136–145)
WBC NRBC COR # BLD AUTO: 8.97 10*3/MM3 (ref 3.4–10.8)

## 2024-03-19 PROCEDURE — 80048 BASIC METABOLIC PNL TOTAL CA: CPT | Performed by: PHYSICIAN ASSISTANT

## 2024-03-19 PROCEDURE — 33263 RMVL & RPLCMT DFB GEN 2 LEAD: CPT | Performed by: INTERNAL MEDICINE

## 2024-03-19 PROCEDURE — 99152 MOD SED SAME PHYS/QHP 5/>YRS: CPT | Performed by: INTERNAL MEDICINE

## 2024-03-19 PROCEDURE — C1721 AICD, DUAL CHAMBER: HCPCS | Performed by: INTERNAL MEDICINE

## 2024-03-19 PROCEDURE — 99153 MOD SED SAME PHYS/QHP EA: CPT | Performed by: INTERNAL MEDICINE

## 2024-03-19 PROCEDURE — 85027 COMPLETE CBC AUTOMATED: CPT | Performed by: INTERNAL MEDICINE

## 2024-03-19 PROCEDURE — 93641 EP EVL 1/2CHMB PAC CVDFB TST: CPT | Performed by: INTERNAL MEDICINE

## 2024-03-19 PROCEDURE — 25010000002 MIDAZOLAM PER 1 MG: Performed by: INTERNAL MEDICINE

## 2024-03-19 PROCEDURE — 25010000002 LIDOCAINE 1 % SOLUTION: Performed by: INTERNAL MEDICINE

## 2024-03-19 PROCEDURE — 25010000002 FENTANYL CITRATE (PF) 50 MCG/ML SOLUTION: Performed by: INTERNAL MEDICINE

## 2024-03-19 PROCEDURE — 25010000002 BUPIVACAINE 0.5 % SOLUTION: Performed by: INTERNAL MEDICINE

## 2024-03-19 PROCEDURE — 93005 ELECTROCARDIOGRAM TRACING: CPT | Performed by: INTERNAL MEDICINE

## 2024-03-19 PROCEDURE — 25810000003 SODIUM CHLORIDE 0.9 % SOLUTION: Performed by: INTERNAL MEDICINE

## 2024-03-19 PROCEDURE — 25010000002 CEFAZOLIN PER 500 MG: Performed by: PHYSICIAN ASSISTANT

## 2024-03-19 PROCEDURE — 25010000002 ONDANSETRON PER 1 MG: Performed by: INTERNAL MEDICINE

## 2024-03-19 DEVICE — IMPLANTABLE CARDIOVERTER DEFIBRILLATOR DR
Type: IMPLANTABLE DEVICE | Site: HEART | Status: FUNCTIONAL
Brand: MOMENTUM™ EL ICD DR

## 2024-03-19 RX ORDER — RAMIPRIL 2.5 MG/1
2.5 CAPSULE ORAL NIGHTLY
COMMUNITY

## 2024-03-19 RX ORDER — SODIUM CHLORIDE 0.9 % (FLUSH) 0.9 %
10 SYRINGE (ML) INJECTION AS NEEDED
Status: DISCONTINUED | OUTPATIENT
Start: 2024-03-19 | End: 2024-03-19 | Stop reason: HOSPADM

## 2024-03-19 RX ORDER — SODIUM CHLORIDE 9 MG/ML
INJECTION, SOLUTION INTRAVENOUS
Status: COMPLETED | OUTPATIENT
Start: 2024-03-19 | End: 2024-03-19

## 2024-03-19 RX ORDER — BUPIVACAINE HYDROCHLORIDE 5 MG/ML
INJECTION, SOLUTION PERINEURAL
Status: DISCONTINUED | OUTPATIENT
Start: 2024-03-19 | End: 2024-03-19 | Stop reason: HOSPADM

## 2024-03-19 RX ORDER — ONDANSETRON 2 MG/ML
INJECTION INTRAMUSCULAR; INTRAVENOUS
Status: DISCONTINUED | OUTPATIENT
Start: 2024-03-19 | End: 2024-03-19 | Stop reason: HOSPADM

## 2024-03-19 RX ORDER — ACETAMINOPHEN 325 MG/1
650 TABLET ORAL EVERY 4 HOURS PRN
Status: DISCONTINUED | OUTPATIENT
Start: 2024-03-19 | End: 2024-03-19 | Stop reason: HOSPADM

## 2024-03-19 RX ORDER — SODIUM CHLORIDE 9 MG/ML
40 INJECTION, SOLUTION INTRAVENOUS AS NEEDED
Status: DISCONTINUED | OUTPATIENT
Start: 2024-03-19 | End: 2024-03-19 | Stop reason: HOSPADM

## 2024-03-19 RX ORDER — MIDAZOLAM HYDROCHLORIDE 1 MG/ML
INJECTION INTRAMUSCULAR; INTRAVENOUS
Status: DISCONTINUED | OUTPATIENT
Start: 2024-03-19 | End: 2024-03-19 | Stop reason: HOSPADM

## 2024-03-19 RX ORDER — NITROGLYCERIN 0.4 MG/1
0.4 TABLET SUBLINGUAL
Status: DISCONTINUED | OUTPATIENT
Start: 2024-03-19 | End: 2024-03-19 | Stop reason: HOSPADM

## 2024-03-19 RX ORDER — TRAZODONE HYDROCHLORIDE 100 MG/1
100 TABLET ORAL NIGHTLY
COMMUNITY

## 2024-03-19 RX ORDER — ONDANSETRON 2 MG/ML
4 INJECTION INTRAMUSCULAR; INTRAVENOUS EVERY 6 HOURS PRN
Status: DISCONTINUED | OUTPATIENT
Start: 2024-03-19 | End: 2024-03-19 | Stop reason: HOSPADM

## 2024-03-19 RX ORDER — FENTANYL CITRATE 50 UG/ML
INJECTION, SOLUTION INTRAMUSCULAR; INTRAVENOUS
Status: DISCONTINUED | OUTPATIENT
Start: 2024-03-19 | End: 2024-03-19 | Stop reason: HOSPADM

## 2024-03-19 RX ORDER — SODIUM CHLORIDE 0.9 % (FLUSH) 0.9 %
3 SYRINGE (ML) INJECTION EVERY 12 HOURS SCHEDULED
Status: DISCONTINUED | OUTPATIENT
Start: 2024-03-19 | End: 2024-03-19 | Stop reason: HOSPADM

## 2024-03-19 RX ORDER — LIDOCAINE HYDROCHLORIDE 10 MG/ML
INJECTION, SOLUTION INFILTRATION; PERINEURAL
Status: DISCONTINUED | OUTPATIENT
Start: 2024-03-19 | End: 2024-03-19 | Stop reason: HOSPADM

## 2024-03-19 RX ADMIN — SODIUM CHLORIDE 2 G: 900 INJECTION INTRAVENOUS at 15:51

## 2024-03-19 NOTE — INTERVAL H&P NOTE
H&P reviewed. The patient was examined and there are no changes to the H&P.       EP Pre-Procedure Report  Cardiovascular Laboratory  [facility]    Patient:  Bill Umana  :  1965    DATE: 3/19/2024      MEDICATIONS:  Prior to Admission medications    Medication Sig Start Date End Date Taking? Authorizing Provider   FLUoxetine (PROzac) 20 MG capsule Take 1 capsule by mouth Daily.   Yes Mary Jo Castañeda MD   FLUoxetine (PROzac) 40 MG capsule Take 1 capsule by mouth Daily.   Yes Mary Jo Castañeda MD   furosemide (LASIX) 40 MG tablet TAKE 1 TABLET EVERY DAY  Patient taking differently: Take 1 tablet by mouth Daily. 10/25/23  Yes Solo Jackson PA   gabapentin (NEURONTIN) 300 MG capsule 2 (Two) Times a Day. 18  Yes Mary Jo Castañeda MD   linaclotide (LINZESS) 72 MCG capsule capsule Take 1 capsule by mouth Every Other Day.   Yes Mary Jo Castañeda MD   methocarbamol (ROBAXIN) 500 MG tablet Take 1 tablet by mouth 2 (Two) Times a Day.   Yes Mary Jo Castañeda MD   pantoprazole (PROTONIX) 40 MG EC tablet TAKE 1 TABLET EVERY DAY 10/17/22  Yes Solo Jackson PA   potassium chloride (K-DUR,KLOR-CON) 10 MEQ CR tablet TAKE 1 TABLET EVERY DAY 10/17/22  Yes Solo Jackson PA   ramipril (ALTACE) 2.5 MG capsule Take 1 capsule by mouth Every Night.   Yes Mary Jo Castañeda MD   simvastatin (ZOCOR) 20 MG tablet TAKE 1 TABLET EVERY NIGHT 24  Yes Solo Jackson PA   sotalol (BETAPACE) 120 MG tablet Take 1 tablet by mouth 2 (Two) Times a Day. 24  Yes Cosme Weaver PA   traMADol (ULTRAM) 50 MG tablet Take 1 tablet by mouth 2 (Two) Times a Day. 23  Yes Mary Jo Castañeda MD   traZODone (DESYREL) 100 MG tablet Take 1 tablet by mouth Every Night.   Yes Mary Jo Castañeda MD   ibuprofen (ADVIL,MOTRIN) 800 MG tablet Take 1 tablet by mouth Every 6 (Six) Hours As Needed for Moderate Pain. prn 18   Mary Jo Castañeda MD   meclizine (ANTIVERT) 25 MG  tablet 1 tablet 3 (Three) Times a Day As Needed. 3/23/23   Mary Jo Castañeda MD   oxyCODONE-acetaminophen (PERCOCET) 5-325 MG per tablet Take 1 tablet by mouth Every 4 (Four) Hours As Needed for Pain. 1/12/24   Mary Jo Castañeda MD   albuterol sulfate  (90 Base) MCG/ACT inhaler Inhale 2 puffs Every 4 (Four) Hours As Needed for Wheezing.  3/19/24  Mary Jo Castañeda MD   aspirin 81 MG tablet Take 1 tablet by mouth Daily.  3/19/24  Tash Hernadez APRN   nitroglycerin (NITROSTAT) 0.4 MG SL tablet 1 under the tongue as needed for angina, may repeat q5mins for up three doses 1/11/18 3/19/24  Devaughn Terrazas PA   potassium chloride 10 MEQ CR tablet Daily.  3/19/24  Mary Jo Castañeda MD   riFAXIMin (XIFAXAN) 550 MG tablet Take 1 tablet by mouth Every 8 (Eight) Hours.  3/19/24  Mary Jo Castañeda MD   Rimegepant Sulfate (Nurtec) 75 MG tablet dispersible tablet Take  by mouth As Needed (MIGRAINE).  3/19/24  Mary Jo Castañeda MD   traZODone (DESYREL) 50 MG tablet Take 2 tablets by mouth Every Night.  3/19/24  Mary Jo Castañeda MD       Past medical & surgical history, social and family history reviewed in the electronic medical record.    Physical Exam:    Vitals:   Vitals:    03/19/24 1300   BP: 122/90   Pulse:    Resp:    SpO2:           03/19/24  1259   Weight: 102 kg (225 lb 1.4 oz)   Body mass index is 28.13 kg/m².    GENERAL: No apparent distress.  No significant changes since last exam.  CHEST: Clear to auscultation bilaterally no stridor no wheeze.  CV: S1, S2, regular without Murmurs, Rubs or Gallops  EXTREMITIES: No edema.        Results from last 7 days   Lab Units 03/19/24  1329   WBC 10*3/mm3 8.97   HEMOGLOBIN g/dL 15.0   HEMATOCRIT % 45.0   PLATELETS 10*3/mm3 214       IMPRESSION:Cheif Complaint EP: ICD battery depletion      PLAN:  Procedure to perform: ICD generator change         Electronically signed by ROSEANNE Lindo, 03/19/24, 1:44 PM EDT.

## 2024-03-19 NOTE — DISCHARGE INSTRUCTIONS
DR. WU DEVICE GENERATOR CHANGE    Please do not lift more than 10 pounds or raise the affected arm above the shoulder for 2 weeks   after the device was implanted (this does not apply to subcutaneous ICDs).    Avoid activities that involve heavy lifting or rough contact that could result in blows to your   implant site. This allows the incision time to heal.    No showering or getting the incision wet for 3 days post-operatively.     Keep the wound exposed to air unless otherwise indicated, the surgical glue is the bandage.     Do not apply creams, lotions or powders to the incision.     Please avoid allowing a bra strap or suspenders to lay over the incision until it is completely   healed.     No driving for 24 hours post-operatively.     Call your doctor if you have any swelling, redness or discharge around your incision, notice   anything unusual or unexpected or you develop a fever that does not go away in two to three   days.     Take Tylenol and ibuprofen for pain control.    Call your doctor if you hear any beeping sounds/vibratory alerts from your device as this   indicates your device needs to be checked immediately.    You should be scheduled for a wound check in 1 week.     Carry your medical device ID card with you at all times.     Please call our office at [phone] with any questions about the device or incision.

## 2024-03-19 NOTE — OP NOTE
Cardiac Electrophysiology Procedure Note          [facility]    PROCEDURE:   IMPLANTED CARDIOVERTER DEFIBRILLATOR GENERATOR REPLACEMENT    OPERATION PERFORMED:     1. Explantation of [employer] dual-chamber ICD model E110 pulse generator with serial number [id number].    2. Testing of retained leads:         Atrial lead [employer] 4136, 53, cm, [id number]        B. Right ventricular lead [employer] model 0185, 64 cm, [id number] serial number.      3. Implantation of dual-chamber ICD model D121 pulse generator with serial number [id number].    4. Defibrillation threshold testing.    ATTENDING SURGEON: Lobo Downs DO    MODERATE SEDATION FOR PROCEDURE:    Moderate sedation was given during this procedure.    I supervised and directed RN to administer this sedation.  This staff member also monitored the patient's hemodynamic and respiratory status and response to these medications.  Please see the full detailed procedure report generated by the electrophysiology laboratory staff.  The patient tolerated moderate sedation well.  There were no complications regarding sedation.  The total dose of fentanyl was 100 mcg and the total dose of midazolam was 3 mg.  The total dose of Brevital was 20 mg.  First sedation was administered at 1551 and continued through 1621.    ESTIMATED BLOOD LOSS: less than 20 cc    COMPLICATIONS: None.    TIME OUT: Time out was completed with verification of the correct patient identity, procedure to be performed, procedure site and implanted equipment.    INDICATION: Briefly, Bill Umana  is a 58 y.o. male with a history of nonischemic cardiomyopathy with a QRS duration of less than 90 milliseconds, severe systolic dysfunction with an LVEF of 25% and NYHA functional class 3 heart failure.     The patient was able to give written informed consent after revisiting the key portions of the risk versus benefit profile of the procedure.  This  discussion was framed by our lengthy conversations  (please see our detailed notes).  Patient verbalized strong understanding of this discussion and a strong desire to proceed with the procedure.  Please note that this detailed informed consent process utilized mutual and shared decision making process between all parties involved, principally the physician and patient, but also potentially with input from the patient's selected family and friends.    Please especially note that the patient has been on maximally tolerated doses of guideline directed medical therapy, including but not limited to: HF indicated beta-blocker (carvedilol, metoprolol succinate), ACE Inhibitor or Angiotensin receptor blocker.  Please note that for some of these medications the maximally tolerated dose may be zero.  The patient has not had a myocardial infarction within 40 days and has not had coronary revascularization within 90 days.    This patient who is a candidate for an ICD has a life expectancy greater than 12 months.    The patient was recently seen in our ICD clinic at which time the patient's device was discovered to be at the elective replacement indicator.   Please see our clinic note for further details.    PROCEDURE AND FINDINGS:  The patient was brought to the electrophysiology suite in a post-absorbtive state.  Informed consent was obtained prior to the procedure and confirmed.  Intravenous prophylactic antibiotics were administered and confirmed to be completely infused prior to start of the procedure.  After the site of implantation was prepped and draped in the usual sterile fashion and after adequate anesthesia was given, the skin was infiltrated with 1% lidocaine and 0.5% bupivicaine 50/50 mixture.  The skin was incised with a #10 scalpel.  Blunt and electrosurgical dissection was carried out to the pulse generator pocket.  The leads were carefully isolated with blunt and electrosurgical dissection.  Careful attention  was paid not to damage the leads.  The pulse generator was explanted and the pocket was copiously irrigated.  Once adequate hemostasis was confirmed within the pocket, the leads were detached from the pulse generator.  The leads were tested for adequate sensing, impedances and pacing thresholds.  The lead tips for all leads were cleaned and dried thoroughly.  The leads were attached to the appropriate ports on the new pulse generator.  Tug testing was performed on all connections.  The device and leads were placed within the pocket such that the coiled redundant leads were posterior to the pulse generator.  Once again, the device was tested for adequate sensing, impedances and pacing thresholds.    DEFIBRILLATION THRESHOLD TESTING:  Once adequate level of anesthesia was obtained, defibrillation testing was performed using Shock on T induction of ventricular fibrillation.  The patient was successfully internal defibrillated with 21 joules with the least sensitivity.  There were no significant dropouts.    The defibrillation threshold was less than or equal to 21 joules.      The pulse generator was then sutured to the fascia in a medial location within the pocket using non absorbable suture.  The pocket was closed with two layers of suture using 2-0 then 3-0 Vicryl, followed by a layer of surgical adhesive and finally a sterile occlusive dressing.    UNDERLYING RHYTHM: Sinus                   MEASURED DEVICE DATA:  Atrial lead                   sensin.1 mV                   impedance:              667 ohms                   Threshold:               0.8 volts at 0.4 ms  RV lead                   sensing:              15.5 mV                   pacing impedance:    473 ohms                   Threshold:            0.8 volts at 0.4 ms                     RV Coil impedance:    56 ohms                   SVC Coil impedance: 56 ohms                                                      PROGRAMMED  PARAMETERS:    Mode:                                DDD  Lower Rate:                      50 pulses per minute  Upper Tracking Rate:      130 pulses per minute  Mode Switch Rate:           170 bpm                  Tachy Therapy Programming.                  VT 1 zone 30 second detection interval               Detection:  171 beats per minute                Therapy:    This is a monitor zone    VT 2 zone 9 second section interval               Detection:  200 beats per minute               Therapy:    ATP x2 followed by maximal output defibrillation    VF zone 5 seconds detection interval               Detection:  250 beats per minute               Therapy:    ATP during charge followed by maximal output defibrillation.      CONCLUSION:    1) Successful ICD generator change.    2) Successful DFT testing with adequate DFT safety margin.    Patient is to follow up with our clinic in approximately one week and then myself in 3 months.    No lovenox or heparin at any dose is to be given.    FOR THE PATIENT    Please do not lift more than 10 pounds or abduct the shoulder joint / or raise the arm above the shoulder for 6 weeks after device was implanted (this does not apply to subcutaneous ICDs).    Avoid activities that involve heavy lifting or rough contact that could result in blows to your implant site and to allow your incision time to heal.    No shower or getting device incision wet for 2 days post-operatively.    Keep wound exposed to air unless otherwise instructed, the surgical glue is the bandage.    No creams, lotions, or powders to incision.    Please avoid allowing bra strap or suspenders to lay over incision until completely healed.    Avoid hot tubs or pools until incision completely healed.    No driving for 24 hours post-operatively after device implant.    Call your doctor if you have any swelling, redness or discharge around your incision, notice anything unusual or unexpected, or you develop a fever  that does not go away in two or three days.    Call your doctor if you hear any beeping sounds / vibratory alerts from your device as this indicates your device needs to be checked immediately.    Carry your Medical Device ID Card with you at all times.  Please call our office () with any questions about the device or the wounds.          Lobo Downs, DO, FACC, RS  Cardiac Electrophysiologist  [facility] / [facility]

## 2024-03-20 ENCOUNTER — CALL CENTER PROGRAMS (OUTPATIENT)
Dept: CALL CENTER | Facility: HOSPITAL | Age: 59
End: 2024-03-20
Payer: MEDICARE

## 2024-03-20 NOTE — OUTREACH NOTE
PCI/Device Survey      Flowsheet Row Responses   Facility patient discharged from? [facility]   Procedure date 03/19/24   Procedure (if device, specify in description) Device   Device Description IMPLANTED CARDIOVERTER DEFIBRILLATOR GENERATOR REPLACEMENT   Performing MD Dr. Lobo Downs   Attempt successful? No   Unsuccessful attempts Attempt 2            Lis FLORENCE - Registered Nurse

## 2024-03-20 NOTE — OUTREACH NOTE
PCI/Device Survey      Flowsheet Row Responses   Facility patient discharged from? [facility]   Procedure date 03/19/24   Procedure (if device, specify in description) Device   Device Description IMPLANTED CARDIOVERTER DEFIBRILLATOR GENERATOR REPLACEMENT   Performing MD Dr. Lobo Downs   Attempt successful? No   Unsuccessful attempts Attempt 1            Lis FLORENCE - Registered Nurse

## 2024-03-24 LAB
QT INTERVAL: 444 MS
QTC INTERVAL: 489 MS

## 2024-03-26 ENCOUNTER — OFFICE VISIT (OUTPATIENT)
Dept: CARDIOLOGY | Facility: CLINIC | Age: 59
End: 2024-03-26
Payer: MEDICARE

## 2024-03-26 DIAGNOSIS — Z45.02 ELECTIVE REPLACEMENT INDICATED FOR IMPLANTABLE CARDIOVERTER-DEFIBRILLATOR (ICD): Primary | ICD-10-CM

## 2024-03-26 NOTE — PROGRESS NOTES
03/26/2024    Name: Bill Umana  YOB: 1965    WOUND CHECK    Patient has fever: [] YES   [x] NO     Temperature if indicated:     Wound Location:  Left Shoulder     Surgical Glue: intact     Wound Appearance: clear/intact     Plan: normal wound check      Did patient receive home monitoring box? [x] YES   [] NO  (If no, contact device clinic.)    Does patient have questions about remote monitoring? [] YES   [x] NO (If yes notify device clinic)      Notes:   Patient had removed bandage prior to coming in to appointment    Appointment for follow-up scheduled for 3 months post procedure [x]    Future Appointments   Date Time Provider Department Center   4/17/2024 11:40 AM Desiree Burt APRN [facility] AIME AIME   6/7/2024 10:45 AM [facility], 03/26/24

## 2024-03-30 DIAGNOSIS — R06.02 SHORTNESS OF BREATH: ICD-10-CM

## 2024-03-30 DIAGNOSIS — R60.9 EDEMA, UNSPECIFIED TYPE: ICD-10-CM

## 2024-03-30 DIAGNOSIS — I42.9 CARDIOMYOPATHY, UNSPECIFIED TYPE: ICD-10-CM

## 2024-04-01 RX ORDER — POTASSIUM CHLORIDE 750 MG/1
TABLET, EXTENDED RELEASE ORAL
Qty: 90 TABLET | Refills: 3 | Status: SHIPPED | OUTPATIENT
Start: 2024-04-01

## 2024-04-03 NOTE — PROGRESS NOTES
Subjective:     Encounter Date:03/12/2024      Patient ID: Bill Umana is a 59 y.o.  white male from [place], [place].    PCP: RAFY Cuello  ELECTROPHYSIOLOGIST; Lobo Downs MD, [facility]  CARDIOLOGIST: Devaughn Sousa MD    Chief Complaint:   Chief Complaint   Patient presents with    Follow-up     Ischemic Cardiomyopathy     Problem List:  NSVT/nonischemic dilated cardiomyopathy  Status post AICD implant ([employer]) in 2018  Cardiac catheterization March 2018 with normal coronaries and an EF estimated at 40-45%   EF by echocardiogram November 2019 at 35 to 40% with grade 1 diastolic dysfunction  Echocardiogram, 7/2/2021: Mild concentric LVH.  Moderate global hypokinesis.  EF 35-40%.  Diastolic dysfunction.  Left atrium moderately dilated.  Mild aortic valve sclerosis without stenosis.  JORGE L, 2/9/2022:  EF 26 to 30%.  LV systolic function severely decreased.  JORGE L, 3/10/2023: Left ventricular systolic function is moderately decreased. Left ventricular ejection fraction appears to be 31 - 35%.   Interrogation with 4 episodes of NSVT and 1 episode of ventricular tachycardia converted with ATP without defibrillation  Patient on sotalol for VT, followed by EP  Sotalol increased to 120 mg twice daily February 2024  DDD ICD generator change 3/19/2024  Frequent PVCs (bigeminy) on ECG April 2024  Paroxysmal atrial fibrillation  Status post LAAO 12/16/2021  JORGE L, 2/9/2022: 24 mm watchman left atrial appendage occlusion device in place. Device is well-seated with no peridevice flow. No device associated thrombus   PVCs, 20% PVC burden on interrogation February 2024, Frequent PVCs (bigeminy) on ECG April 2024  Chronic systolic heart failure  Pulmonary hypertension  JORGE L 3/10/2023: LVEF 31 - 35%. The left ventricular cavity is mildly-moderately dilated, Moderate tricuspid valve regurgitation is present. Estimated right ventricular systolic pressure from tricuspid regurgitation is mildly elevated  (35-45 mmHg).  Obstructive sleep apnea  Hypertension  Hyperlipidemia  Prediabetes; hemoglobin A1c 6.1% April 2024  Bilateral coronary artery stenosis  Carotid duplex November 2019: 16-49% bilateral ICA stenosis  History of subdural hematoma at [facility] July 2021  Orthostatic hypotension, with referral to [place] [facility]    No Known Allergies    Current Outpatient Medications   Medication Instructions    FLUoxetine (PROZAC) 40 mg, Oral, Daily    FLUoxetine (PROZAC) 20 mg, Oral, Daily    furosemide (LASIX) 40 MG tablet TAKE 1 TABLET EVERY DAY    gabapentin (NEURONTIN) 300 MG capsule 2 Times Daily - RT    ibuprofen (ADVIL,MOTRIN) 800 mg, Oral, Every 6 Hours PRN, prn    linaclotide (LINZESS) 72 mcg, Oral, Every Other Day    meclizine (ANTIVERT) 25 mg, 3 Times Daily PRN    methocarbamol (ROBAXIN) 500 mg, Oral, 2 Times Daily    oxyCODONE-acetaminophen (PERCOCET) 5-325 MG per tablet 1 tablet, Oral, Every 4 Hours PRN    pantoprazole (PROTONIX) 40 MG EC tablet TAKE 1 TABLET EVERY DAY    potassium chloride (KLOR-CON M10) 10 MEQ CR tablet TAKE 1 TABLET EVERY DAY    ramipril (ALTACE) 2.5 mg, Oral, Nightly    simvastatin (ZOCOR) 20 MG tablet TAKE 1 TABLET EVERY NIGHT    sotalol (BETAPACE) 120 mg, Oral, 2 Times Daily    traMADol (ULTRAM) 50 mg, Oral, 2 Times Daily    traZODone (DESYREL) 100 mg, Oral, Nightly         HISTORY OF PRESENT ILLNESS:  The patient is here for an 11-month follow-up appointment.  He is also followed by electrophysiology and recently had an increase in sotalol to 120 mg twice daily February 2024.  He had 20% PVC burden on last device check.  The patient had a DDD ICD generator change 3/19/2024 with Dr Downs.  He also has an upcoming referral to [place] [facility].  Unfortunately the patient's hypotension precludes adding additional GDMT. The patient was recently seen at [facility] last week for shortness of breath.  He did not have to stay overnight and says that he was  "told that he was in heart failure-data deficit.  He did not have any changes to his medications at that time and he was discharged.  The patient had his medical records in his car but forgot them when coming to his appointment today.  He will have the results of this sent to me for review.  He denies any chest pain, presyncope, or syncope.  Occasionally he will have pedal edema.  He recently increased his Lasix to 40 mg daily.  He does not feel like it has helped much with his shortness of breath.  He has noticed that his shortness of breath has worsened over the past week but he has been short of breath for the past several months.  He says that going up a flight of stairs makes him short of breath.  He has some mild orthopnea.  He has not had his appointment yet at [facility].  The patient says that he can feel his skipped beats and there was consideration of possible upcoming PVC ablation.  I spoke with Dr. Kearney regarding the patient's symptoms and reviewed ECGs.  Patient will follow-up next week with Dr. Downs and continue current medications.        ROS   All other systems reviewed and otherwise negative.      ECG 12 Lead    Date/Time: 4/17/2024 11:48 AM  Performed by: Desiree Burt APRN    Authorized by: Desiree Burt APRN  Rhythm comments: Sinus rhythm with frequent PACs and PVCs, LBBB, abnormal ECG, 84 bpm,  ms,  ms, QTc 553 ms, frequent PVCs compared to ECG March 2024             Objective:       Vitals:    04/17/24 1053 04/17/24 1057   BP: 110/70 114/72   BP Location: Left arm Right arm   Patient Position: Sitting Sitting   Pulse: 84    SpO2: 97%    Weight: 103 kg (228 lb)    Height: 190.5 cm (75\")      Body mass index is 28.5 kg/m².  Wt Readings from Last 2 Encounters:   04/17/24 103 kg (228 lb)   03/19/24 102 kg (225 lb 1.4 oz)        Constitutional:       Appearance: Healthy appearance. Not in distress.   Neck:      Vascular: No JVR. JVD normal.   Pulmonary:      Effort: " Pulmonary effort is normal.      Breath sounds: Normal breath sounds. No wheezing. No rhonchi. No rales.   Chest:      Chest wall: Not tender to palpatation.   Cardiovascular:      PMI at left midclavicular line. Normal rate. Frequent ectopic beats. Regular rhythm. Normal S1. Normal S2.       Murmurs: There is a grade 2/6 systolic murmur at the URSB and LLSB.      No gallop.  No click. No rub.   Pulses:     Intact distal pulses.   Edema:     Peripheral edema absent.   Abdominal:      General: Bowel sounds are normal.      Palpations: Abdomen is soft.      Tenderness: There is no abdominal tenderness.   Musculoskeletal: Normal range of motion.         General: No tenderness. Skin:     General: Skin is warm and dry.   Neurological:      General: No focal deficit present.      Mental Status: Alert and oriented to person, place and time.           Lab Review:   Lab Results   Component Value Date    GLUCOSE 99 03/19/2024    BUN 10 03/19/2024    CREATININE 0.94 03/19/2024    EGFRIFNONA 89 12/13/2021    EGFRIFAFRI >60 06/26/2021    BCR 10.6 03/19/2024    CO2 24.0 03/19/2024    CALCIUM 9.0 03/19/2024    ALBUMIN 3.6 06/26/2021    AST 24 06/26/2021    ALT 32 06/26/2021       Lab Results   Component Value Date    WBC 8.97 03/19/2024    HGB 15.0 03/19/2024    HCT 45.0 03/19/2024    MCV 87.7 03/19/2024     03/19/2024       Lab Results   Component Value Date    HGBA1C 5.7 (H) 06/25/2021       Lab Results   Component Value Date    TSH 2.610 11/23/2022       Lab Results   Component Value Date    TRIG 54 06/25/2021     Lab Results   Component Value Date    HDL 47 06/25/2021     Lab Results   Component Value Date    LDL 88.2 06/25/2021 4/04/2024:  Vitamin   Folate 10  Hemoglobin A1c 6.1%  TSH 1.84  CBC: WBC 7.7, RBC 5.27, hemoglobin 15.6, hematocrit 47.1, MCV 89, MCH 29.6, MCHC 33.1, RDW 13.5, platelets 258, neutrophils 69, left/21, monocytes 7, eos 2, basos 1  .9  Lipid panel: Cholesterol 145,  triglycerides 122, HDL 35, LDL 88  CMP: Glucose 79, BUN 12, creatinine 1.17, GFR 72, sodium 140, potassium 4, chloride 101, carbon dioxide 27, calcium 9.5, protein 7.2, albumin 4.2, globulin 3, bilirubin 0.4, alkaline phosphatase 102, AST 14, ALT 13    Results for orders placed during the hospital encounter of 03/10/23    Adult Transesophageal Echo 3D (JORGE L) W/ Cont If Necessary Per Protocol    Interpretation Summary    24 mm Watchman FLX left atrial appendage occlusion device in place. There is no pierce-prosthetic flow.    Left ventricular systolic function is moderately decreased. Left ventricular ejection fraction appears to be 31 - 35%.    The left ventricular cavity is mildly-moderately dilated    The left atrial cavity is moderate to severely dilated.    Mild mitral valve regurgitation is present    Moderate tricuspid valve regurgitation is present. Estimated right ventricular systolic pressure from tricuspid regurgitation is mildly elevated (35-45 mmHg).    Trace to mild aortic valve regurgitation is present    Pacemaker leads noted in the right atrium and right ventricle.       Advance Care Planning   ACP discussion was held with the patient during this visit. Patient has an advance directive (not in EMR), copy requested.    Dual-chamber ICD interrogation 2/26/2024: 2% RV pacing, 6% RA pacing, NOEL 2/7/2024, no reprogramming        Assessment:    Patient with nonischemic dilated cardiomyopathy with last EF 31-35% on JORGE L in March 2023. Unfortunately the patient's hypotension precludes adding additional GDMT.  Patient had a recent increase in sotalol to 120 mg twice daily and is followed by EP.  Patient had DDD ICD generator change March 2024.  He has an upcoming referral to see the [place] [facility].  Patient in bigeminy on ECG in office today with symptoms of palpitations and shortness of breath.  I will have him follow-up with Dr. Downs 4/22/2024.  In the interim I will switch his Lasix  to torsemide 20 mg daily.  If the patient has worsening shortness of breath he is going to go to the ED.  Patient will send me his last labs and medical records from [facility] ED visit from last week.     Diagnosis Plan   1. Ischemic cardiomyopathy  Patient with nonischemic dilated cardiomyopathy with last EF 31-35% on JORGE L in March 2023. Unfortunately the patient's hypotension precludes adding additional GDMT.  Patient had a recent increase in sotalol to 120 mg twice daily and is followed by EP.  Patient had DDD ICD generator change March 2024.  He has an upcoming referral to see the [place] [facility].       2. Paroxysmal atrial fibrillation  Continue sotalol, f/u with Dr Downs next week      3. Essential hypertension  Controlled, continue current cardiac medications      4. Mixed hyperlipidemia  Acceptable lipid panel April 2024, continue simvastatin               Plan:         Patient to continue current medications and close follow up with the above providers.  Tentative cardiology follow up in July 2024 with [facility]K or patient may return sooner PRN.  Patient to see Dr. Downs on 4/22/2024  Switch Lasix to torsemide 20 mg daily.  If the patient has worsening shortness of breath he is going to go to the ED.  Patient will send me his last labs and medical records from OSH ED visit from last week.      Electronically signed by RAFY De Leon, 04/17/24, 11:55 AM EDT.

## 2024-04-17 ENCOUNTER — OFFICE VISIT (OUTPATIENT)
Dept: CARDIOLOGY | Facility: CLINIC | Age: 59
End: 2024-04-17
Payer: MEDICARE

## 2024-04-17 ENCOUNTER — TELEPHONE (OUTPATIENT)
Dept: CARDIOLOGY | Facility: CLINIC | Age: 59
End: 2024-04-17
Payer: MEDICARE

## 2024-04-17 VITALS
OXYGEN SATURATION: 97 % | WEIGHT: 228 LBS | DIASTOLIC BLOOD PRESSURE: 72 MMHG | BODY MASS INDEX: 28.35 KG/M2 | HEART RATE: 84 BPM | SYSTOLIC BLOOD PRESSURE: 114 MMHG | HEIGHT: 75 IN

## 2024-04-17 DIAGNOSIS — I48.0 PAROXYSMAL ATRIAL FIBRILLATION: ICD-10-CM

## 2024-04-17 DIAGNOSIS — I10 ESSENTIAL HYPERTENSION: ICD-10-CM

## 2024-04-17 DIAGNOSIS — E78.2 MIXED HYPERLIPIDEMIA: ICD-10-CM

## 2024-04-17 DIAGNOSIS — I25.5 ISCHEMIC CARDIOMYOPATHY: Primary | ICD-10-CM

## 2024-04-17 PROCEDURE — 1160F RVW MEDS BY RX/DR IN RCRD: CPT | Performed by: NURSE PRACTITIONER

## 2024-04-17 PROCEDURE — 1159F MED LIST DOCD IN RCRD: CPT | Performed by: NURSE PRACTITIONER

## 2024-04-17 PROCEDURE — 3078F DIAST BP <80 MM HG: CPT | Performed by: NURSE PRACTITIONER

## 2024-04-17 PROCEDURE — 3074F SYST BP LT 130 MM HG: CPT | Performed by: NURSE PRACTITIONER

## 2024-04-17 PROCEDURE — 99214 OFFICE O/P EST MOD 30 MIN: CPT | Performed by: NURSE PRACTITIONER

## 2024-04-17 PROCEDURE — 93000 ELECTROCARDIOGRAM COMPLETE: CPT | Performed by: NURSE PRACTITIONER

## 2024-04-17 RX ORDER — TORSEMIDE 20 MG/1
20 TABLET ORAL DAILY
Qty: 90 TABLET | Refills: 3 | Status: SHIPPED | OUTPATIENT
Start: 2024-04-17 | End: 2024-04-22 | Stop reason: SDUPTHER

## 2024-04-17 NOTE — TELEPHONE ENCOUNTER
Mr Umana called back and he is going to try and setup monitor tonight.  He will call during office hours if he needs assistance.

## 2024-04-22 ENCOUNTER — OFFICE VISIT (OUTPATIENT)
Dept: CARDIOLOGY | Facility: CLINIC | Age: 59
End: 2024-04-22
Payer: MEDICARE

## 2024-04-22 VITALS
HEART RATE: 95 BPM | WEIGHT: 229 LBS | OXYGEN SATURATION: 96 % | DIASTOLIC BLOOD PRESSURE: 62 MMHG | BODY MASS INDEX: 28.47 KG/M2 | HEIGHT: 75 IN | SYSTOLIC BLOOD PRESSURE: 98 MMHG

## 2024-04-22 DIAGNOSIS — I48.0 PAROXYSMAL ATRIAL FIBRILLATION: Primary | ICD-10-CM

## 2024-04-22 DIAGNOSIS — I42.0 NONISCHEMIC DILATED CARDIOMYOPATHY: ICD-10-CM

## 2024-04-22 DIAGNOSIS — Z95.818 PRESENCE OF WATCHMAN LEFT ATRIAL APPENDAGE CLOSURE DEVICE: ICD-10-CM

## 2024-04-22 DIAGNOSIS — I47.29 NONSUSTAINED VENTRICULAR TACHYCARDIA: ICD-10-CM

## 2024-04-22 DIAGNOSIS — Z79.899 LONG TERM CURRENT USE OF ANTIARRHYTHMIC DRUG: ICD-10-CM

## 2024-04-22 DIAGNOSIS — Z95.810 AICD (AUTOMATIC CARDIOVERTER/DEFIBRILLATOR) PRESENT: ICD-10-CM

## 2024-04-22 PROBLEM — Z45.02 ELECTIVE REPLACEMENT INDICATED FOR IMPLANTABLE CARDIOVERTER-DEFIBRILLATOR (ICD): Status: RESOLVED | Noted: 2024-02-26 | Resolved: 2024-04-22

## 2024-04-22 PROCEDURE — 1160F RVW MEDS BY RX/DR IN RCRD: CPT | Performed by: INTERNAL MEDICINE

## 2024-04-22 PROCEDURE — 1159F MED LIST DOCD IN RCRD: CPT | Performed by: INTERNAL MEDICINE

## 2024-04-22 PROCEDURE — 3078F DIAST BP <80 MM HG: CPT | Performed by: INTERNAL MEDICINE

## 2024-04-22 PROCEDURE — G2211 COMPLEX E/M VISIT ADD ON: HCPCS | Performed by: INTERNAL MEDICINE

## 2024-04-22 PROCEDURE — 3074F SYST BP LT 130 MM HG: CPT | Performed by: INTERNAL MEDICINE

## 2024-04-22 PROCEDURE — 99214 OFFICE O/P EST MOD 30 MIN: CPT | Performed by: INTERNAL MEDICINE

## 2024-04-22 RX ORDER — FUROSEMIDE 80 MG
80 TABLET ORAL DAILY
COMMUNITY

## 2024-04-22 RX ORDER — TORSEMIDE 20 MG/1
20 TABLET ORAL DAILY
Qty: 90 TABLET | Refills: 3 | Status: SHIPPED | OUTPATIENT
Start: 2024-04-22

## 2024-04-22 NOTE — PROGRESS NOTES
Encounter Date:04/22/2024      Patient ID: Bill Umana is a 59 y.o. male.    Kim Goff, RAFY    Cheif Complaint EP: Atrial Fibrillation, NSVT, Nonischemic cardiomyopathy, and ICD Check    PROBLEM LIST:  Patient Active Problem List    Diagnosis Date Noted    Nonsustained ventricular tachycardia 03/23/2018     Priority: High     Note Last Updated: 11/29/2021     interrogation demonstrates 4 episodes of nonsustained ventricular tachycardia and one episode of ventricular tachycardia converted with ATP without defibrillation  patient on Sotalol for ventricular tachycardia followed by EP services      Paroxysmal atrial fibrillation 03/05/2018     Priority: High     Note Last Updated: 3/21/2022     LAAO 12-16-21  JORGE L, 2/9/2022: 24 mm watchman left atrial appendage occlusion device in place.  Device is well-seated with no peridevice flow.  No device associated thrombus.      Nonischemic dilated cardiomyopathy 02/19/2018     Priority: High     Note Last Updated: 4/22/2024     S/P AICD implant ([employer])   Cardiac catheterization March 2018 with normal coronaries and an EF estimated at 40-45%  EF by echocardiogram November 2019 at 35 to 40% with grade 1 diastolic dysfunction  Echocardiogram, 7/2/2021: Mild concentric LVH.  Moderate global hypokinesis.  EF 35-40%.  Diastolic dysfunction.  Left atrium moderately dilated.  Mild aortic valve sclerosis without stenosis.  JORGE L, 2/9/2022:  EF 26 to 30%.  LV systolic function severely decreased.  JORGE L, 3-10-23: Left ventricular systolic function is moderately decreased. Left ventricular ejection fraction appears to be 31 - 35%.   ICD generator change, 3/19/2024:Implantation of dual-chamber ICD model D121       PVC (premature ventricular contraction) 12/27/2023     Priority: Medium    Chronic systolic heart failure 04/27/2023     Priority: Medium    Presence of Watchman left atrial appendage closure device 03/24/2023     Priority: Medium    Orthostatic  hypotension 02/13/2019     Priority: Medium    AICD (automatic cardioverter/defibrillator) present 03/05/2018     Priority: Medium     Note Last Updated: 11/29/2021     S/P AICD implant ([employer])       Long term current use of antiarrhythmic drug 02/26/2024     Priority: Low    LISA (obstructive sleep apnea) 05/30/2023     Priority: Low    Hyperlipidemia 03/05/2018     Priority: Low    Essential hypertension 03/05/2018     Priority: Low    Depressive disorder 04/27/2023    Pulmonary HTN 03/24/2023     Note Last Updated: 3/24/2023     1. JORGE L 3-10-23:     Left ventricular systolic function is moderately decreased. Left ventricular ejection fraction appears to be 31 - 35%.    The left ventricular cavity is mildly-moderately dilated    Moderate tricuspid valve regurgitation is present. Estimated right ventricular systolic pressure from tricuspid regurgitation is mildly elevated (35-45 mmHg).      Chronic pain disorder 09/20/2022    Lumbar post-laminectomy syndrome 09/20/2022    Bilateral carotid artery stenosis 11/29/2021     Note Last Updated: 11/29/2021     16 to 49% bilateral internal carotid artery stenosis by duplex November 2019      History of SDH (subdural hematoma) 11/29/2021     Note Last Updated: 11/29/2021     [facility] 7-23-21      Chronic fatigue 12/04/2019               History of Present Illness  Patient presents today for follow-up with a history of paroxysmal atrial fibrillation with a left atrial appendage occlusion device.  NSVT, nonischemic dilated cardiomyopathy and frequent PVCs on long-term antiarrhythmic.  Returns today for electrophysiology follow-up.  He has not done well since our last visit.  He has severe fatigue and near constant palpitations associated with shortness of breath.  He has gained 10 pounds of weight in the past week.  His blood pressure remains in the 90s systolic.    No Known Allergies    Current Outpatient Medications   Medication Instructions    FLUoxetine (PROZAC) 40  "mg, Oral, Daily    FLUoxetine (PROZAC) 20 mg, Oral, Daily    furosemide (LASIX) 80 mg, Oral, Daily    gabapentin (NEURONTIN) 300 MG capsule 2 Times Daily - RT    ibuprofen (ADVIL,MOTRIN) 800 mg, Oral, Every 6 Hours PRN, prn    linaclotide (LINZESS) 72 mcg, Oral, Every Other Day    meclizine (ANTIVERT) 25 mg, 3 Times Daily PRN    methocarbamol (ROBAXIN) 500 mg, Oral, 2 Times Daily    oxyCODONE-acetaminophen (PERCOCET) 5-325 MG per tablet 1 tablet, Oral, Every 4 Hours PRN    pantoprazole (PROTONIX) 40 MG EC tablet TAKE 1 TABLET EVERY DAY    potassium chloride (KLOR-CON M10) 10 MEQ CR tablet TAKE 1 TABLET EVERY DAY    ramipril (ALTACE) 2.5 mg, Oral, Nightly    simvastatin (ZOCOR) 20 MG tablet TAKE 1 TABLET EVERY NIGHT    sotalol (BETAPACE) 120 mg, Oral, 2 Times Daily    torsemide (DEMADEX) 20 mg, Oral, Daily    traMADol (ULTRAM) 50 mg, Oral, 2 Times Daily    traZODone (DESYREL) 100 mg, Oral, Nightly       .    Objective:     BP 98/62 (BP Location: Left arm, Patient Position: Sitting)   Pulse 95   Ht 190.5 cm (75\")   Wt 104 kg (229 lb)   SpO2 96%   BMI 28.62 kg/m²    Body mass index is 28.62 kg/m².     Vitals reviewed.   Constitutional:       Appearance: Well-developed.   Pulmonary:      Effort: Pulmonary effort is normal. No respiratory distress.      Breath sounds: Normal breath sounds. No wheezing. No rales.      Comments: Bases clear  Chest:      Chest wall: Not tender to palpatation.   Cardiovascular:      Bradycardia and tachycardia present. Regular rhythm.      Murmurs: There is no murmur.      No gallop.  No click. No rub.   Pulses:     Intact distal pulses.   Edema:     Peripheral edema absent.   Musculoskeletal: Normal range of motion.       Lab Review:     Lab Results   Component Value Date    GLUCOSE 99 03/19/2024    BUN 10 03/19/2024    CREATININE 0.94 03/19/2024    EGFRRESULT 84 11/23/2022    EGFR 93.4 03/19/2024    BCR 10.6 03/19/2024    K 3.9 03/19/2024    CO2 24.0 03/19/2024    CALCIUM 9.0 " 03/19/2024    ALBUMIN 3.6 06/26/2021    BILITOT 0.4 06/26/2021    AST 24 06/26/2021    ALT 32 06/26/2021     Lab Results   Component Value Date    WBC 8.97 03/19/2024    HGB 15.0 03/19/2024    HCT 45.0 03/19/2024    MCV 87.7 03/19/2024     03/19/2024     Lab Results   Component Value Date    TSH 2.610 11/23/2022           Procedures               Assessment:      Diagnosis Plan   1. Paroxysmal atrial fibrillation  Sotalol for rhythm control.  Scant intermittent episodes of A-fib.      2. Nonsustained ventricular tachycardia  Ventricular bigeminy presently.  The main issue is recurrent PVCs.  This seems to be 1 particular predominant repetitive morphology.  Twelve-lead EKG demonstrates that this is consistent with an outflow tract morphology PVC.  This patient however does have underlying clinical and significant structural heart disease and indeed this could be a PVC that is either idiopathic or related to his cardiomyopathy.  We will plan for the latter.    Lengthy conversation with him.  He wishes to proceed with catheter ablation.  He is not interested other antiarrhythmic drugs.    Quoted him a 2 to 3% chance significant procedure complication risk including bleeding of the groin Cardy perforation-stroke myocardial infarction death and even potentially dislodgment of his ICD leads.    General anesthesia.    Rhythmia.    Plan for biventricular mapping.    Stereotaxis.    Follow-up sotalol 3 days before him.      3. Nonischemic dilated cardiomyopathy  Stable.  Clinically euvolemic.  PVC interaction as above.      4. Presence of Watchman left atrial appendage closure device  24 mm watchman in situ.  Left atrial pended closure documented..      5. AICD (automatic cardioverter/defibrillator) present  Dual-chamber [employer] transvenous ICD system in situ.  Significant PVC burden noted.      6. Long term current use of antiarrhythmic drug  Sotalol.        I spent 45 minutes in consultation with this  patient which included more than 65% of this time in direct face-to-face counseling, physical examination and discussion of my assessment and findings and this shared decision making with the patient.  The remainder of the time not spent face-to-face was performing one, some or all of the following actions: preparing to see the patient (e.g. reviewing tests, prior clinicians' notes, etc), ordering medications, tests or procedures, coordination of care, discussion of the plan with other healthcare providers, documenting clinical information in epic as well as independently interpreting results and communication of these results to the patient family and/or caregiver(s).  Please note that this explicitly excludes time spent on other separate billable services such as performing procedures or test interpretation, when applicable.          Electronically signed by ROSEANNE Lindo, 04/22/24, 3:58 PM EDT.    '

## 2024-04-25 ENCOUNTER — PREP FOR SURGERY (OUTPATIENT)
Dept: OTHER | Facility: HOSPITAL | Age: 59
End: 2024-04-25
Payer: MEDICARE

## 2024-04-25 DIAGNOSIS — I48.0 PAROXYSMAL ATRIAL FIBRILLATION: Primary | ICD-10-CM

## 2024-04-25 RX ORDER — ONDANSETRON 2 MG/ML
4 INJECTION INTRAMUSCULAR; INTRAVENOUS EVERY 6 HOURS PRN
OUTPATIENT
Start: 2024-04-25

## 2024-04-25 RX ORDER — SODIUM CHLORIDE 9 MG/ML
40 INJECTION, SOLUTION INTRAVENOUS AS NEEDED
OUTPATIENT
Start: 2024-04-25

## 2024-04-25 RX ORDER — ACETAMINOPHEN 325 MG/1
650 TABLET ORAL EVERY 4 HOURS PRN
OUTPATIENT
Start: 2024-04-25

## 2024-04-25 RX ORDER — NITROGLYCERIN 0.4 MG/1
0.4 TABLET SUBLINGUAL
OUTPATIENT
Start: 2024-04-25

## 2024-05-31 ENCOUNTER — ANESTHESIA EVENT (OUTPATIENT)
Dept: CARDIOLOGY | Facility: HOSPITAL | Age: 59
End: 2024-05-31
Payer: MEDICARE

## 2024-05-31 ENCOUNTER — HOSPITAL ENCOUNTER (OUTPATIENT)
Facility: HOSPITAL | Age: 59
Discharge: HOME | End: 2024-05-31
Attending: INTERNAL MEDICINE | Admitting: INTERNAL MEDICINE
Payer: MEDICARE

## 2024-05-31 ENCOUNTER — ANESTHESIA (OUTPATIENT)
Dept: CARDIOLOGY | Facility: HOSPITAL | Age: 59
End: 2024-05-31
Payer: MEDICARE

## 2024-05-31 VITALS
WEIGHT: 223.2 LBS | SYSTOLIC BLOOD PRESSURE: 113 MMHG | RESPIRATION RATE: 18 BRPM | TEMPERATURE: 97 F | HEART RATE: 88 BPM | DIASTOLIC BLOOD PRESSURE: 88 MMHG | OXYGEN SATURATION: 94 % | HEIGHT: 75 IN | BODY MASS INDEX: 27.75 KG/M2

## 2024-05-31 DIAGNOSIS — I48.0 PAROXYSMAL ATRIAL FIBRILLATION: ICD-10-CM

## 2024-05-31 DIAGNOSIS — I47.29 NONSUSTAINED VENTRICULAR TACHYCARDIA: ICD-10-CM

## 2024-05-31 LAB
ANION GAP SERPL CALCULATED.3IONS-SCNC: 9 MMOL/L (ref 5–15)
BUN SERPL-MCNC: 15 MG/DL (ref 8–23)
BUN/CREAT SERPL: 17 (ref 7–25)
CALCIUM SPEC-SCNC: 9 MG/DL (ref 8.6–10.5)
CHLORIDE SERPL-SCNC: 103 MMOL/L (ref 98–107)
CO2 SERPL-SCNC: 26 MMOL/L (ref 22–29)
CREAT SERPL-MCNC: 0.88 MG/DL (ref 0.76–1.27)
DEPRECATED RDW RBC AUTO: 41.6 FL (ref 37–54)
EGFRCR SERPLBLD CKD-EPI 2021: 98.4 ML/MIN/1.73
ERYTHROCYTE [DISTWIDTH] IN BLOOD BY AUTOMATED COUNT: 12.7 % (ref 12.3–15.4)
GLUCOSE SERPL-MCNC: 105 MG/DL (ref 65–99)
HCT VFR BLD AUTO: 45.7 % (ref 37.5–51)
HGB BLD-MCNC: 15.3 G/DL (ref 13–17.7)
MAGNESIUM SERPL-MCNC: 2.2 MG/DL (ref 1.6–2.4)
MCH RBC QN AUTO: 29.8 PG (ref 26.6–33)
MCHC RBC AUTO-ENTMCNC: 33.5 G/DL (ref 31.5–35.7)
MCV RBC AUTO: 89.1 FL (ref 79–97)
PLATELET # BLD AUTO: 218 10*3/MM3 (ref 140–450)
PMV BLD AUTO: 11 FL (ref 6–12)
POTASSIUM SERPL-SCNC: 4.1 MMOL/L (ref 3.5–5.2)
RBC # BLD AUTO: 5.13 10*6/MM3 (ref 4.14–5.8)
SODIUM SERPL-SCNC: 138 MMOL/L (ref 136–145)
WBC NRBC COR # BLD AUTO: 8.02 10*3/MM3 (ref 3.4–10.8)

## 2024-05-31 PROCEDURE — 93654 COMPRE EP EVAL TX VT: CPT | Performed by: INTERNAL MEDICINE

## 2024-05-31 PROCEDURE — 85027 COMPLETE CBC AUTOMATED: CPT | Performed by: PHYSICIAN ASSISTANT

## 2024-05-31 PROCEDURE — 93662 INTRACARDIAC ECG (ICE): CPT | Performed by: INTERNAL MEDICINE

## 2024-05-31 PROCEDURE — 36415 COLL VENOUS BLD VENIPUNCTURE: CPT

## 2024-05-31 PROCEDURE — C1894 INTRO/SHEATH, NON-LASER: HCPCS | Performed by: INTERNAL MEDICINE

## 2024-05-31 PROCEDURE — C1769 GUIDE WIRE: HCPCS | Performed by: INTERNAL MEDICINE

## 2024-05-31 PROCEDURE — 25810000003 SODIUM CHLORIDE 0.9 % SOLUTION: Performed by: INTERNAL MEDICINE

## 2024-05-31 PROCEDURE — C1732 CATH, EP, DIAG/ABL, 3D/VECT: HCPCS | Performed by: INTERNAL MEDICINE

## 2024-05-31 PROCEDURE — 85347 COAGULATION TIME ACTIVATED: CPT

## 2024-05-31 PROCEDURE — 93799 UNLISTED CV SVC/PROCEDURE: CPT | Performed by: INTERNAL MEDICINE

## 2024-05-31 PROCEDURE — A9270 NON-COVERED ITEM OR SERVICE: HCPCS | Performed by: INTERNAL MEDICINE

## 2024-05-31 PROCEDURE — 80048 BASIC METABOLIC PNL TOTAL CA: CPT | Performed by: PHYSICIAN ASSISTANT

## 2024-05-31 PROCEDURE — 25010000002 DEXAMETHASONE PER 1 MG

## 2024-05-31 PROCEDURE — 25010000002 PROTAMINE SULFATE PER 10 MG: Performed by: INTERNAL MEDICINE

## 2024-05-31 PROCEDURE — 83735 ASSAY OF MAGNESIUM: CPT | Performed by: PHYSICIAN ASSISTANT

## 2024-05-31 PROCEDURE — 63710000001 ACETAMINOPHEN 325 MG TABLET: Performed by: INTERNAL MEDICINE

## 2024-05-31 PROCEDURE — C1766 INTRO/SHEATH,STRBLE,NON-PEEL: HCPCS | Performed by: INTERNAL MEDICINE

## 2024-05-31 PROCEDURE — 25010000002 HEPARIN (PORCINE) PER 1000 UNITS: Performed by: INTERNAL MEDICINE

## 2024-05-31 PROCEDURE — 25010000002 SUGAMMADEX 500 MG/5ML SOLUTION

## 2024-05-31 PROCEDURE — 25810000003 SODIUM CHLORIDE 0.9 % SOLUTION

## 2024-05-31 PROCEDURE — C1759 CATH, INTRA ECHOCARDIOGRAPHY: HCPCS | Performed by: INTERNAL MEDICINE

## 2024-05-31 PROCEDURE — C1730 CATH, EP, 19 OR FEW ELECT: HCPCS | Performed by: INTERNAL MEDICINE

## 2024-05-31 PROCEDURE — 93623 PRGRMD STIMJ&PACG IV RX NFS: CPT | Performed by: INTERNAL MEDICINE

## 2024-05-31 PROCEDURE — C1893 INTRO/SHEATH, FIXED,NON-PEEL: HCPCS | Performed by: INTERNAL MEDICINE

## 2024-05-31 PROCEDURE — 93462 L HRT CATH TRNSPTL PUNCTURE: CPT | Performed by: INTERNAL MEDICINE

## 2024-05-31 PROCEDURE — C1760 CLOSURE DEV, VASC: HCPCS | Performed by: INTERNAL MEDICINE

## 2024-05-31 RX ORDER — PROMETHAZINE HYDROCHLORIDE 25 MG/1
25 TABLET ORAL ONCE AS NEEDED
Status: DISCONTINUED | OUTPATIENT
Start: 2024-05-31 | End: 2024-05-31 | Stop reason: HOSPADM

## 2024-05-31 RX ORDER — PROTAMINE SULFATE 10 MG/ML
INJECTION, SOLUTION INTRAVENOUS
Status: DISCONTINUED | OUTPATIENT
Start: 2024-05-31 | End: 2024-05-31 | Stop reason: HOSPADM

## 2024-05-31 RX ORDER — SODIUM CHLORIDE 0.9 % (FLUSH) 0.9 %
3-10 SYRINGE (ML) INJECTION AS NEEDED
Status: DISCONTINUED | OUTPATIENT
Start: 2024-05-31 | End: 2024-05-31 | Stop reason: HOSPADM

## 2024-05-31 RX ORDER — NITROGLYCERIN 0.4 MG/1
0.4 TABLET SUBLINGUAL
Status: DISCONTINUED | OUTPATIENT
Start: 2024-05-31 | End: 2024-05-31 | Stop reason: HOSPADM

## 2024-05-31 RX ORDER — ONDANSETRON 2 MG/ML
4 INJECTION INTRAMUSCULAR; INTRAVENOUS ONCE AS NEEDED
Status: DISCONTINUED | OUTPATIENT
Start: 2024-05-31 | End: 2024-05-31 | Stop reason: HOSPADM

## 2024-05-31 RX ORDER — ETOMIDATE 2 MG/ML
INJECTION INTRAVENOUS AS NEEDED
Status: DISCONTINUED | OUTPATIENT
Start: 2024-05-31 | End: 2024-05-31 | Stop reason: SURG

## 2024-05-31 RX ORDER — SODIUM CHLORIDE 9 MG/ML
40 INJECTION, SOLUTION INTRAVENOUS AS NEEDED
Status: DISCONTINUED | OUTPATIENT
Start: 2024-05-31 | End: 2024-05-31 | Stop reason: HOSPADM

## 2024-05-31 RX ORDER — ROCURONIUM BROMIDE 10 MG/ML
INJECTION, SOLUTION INTRAVENOUS AS NEEDED
Status: DISCONTINUED | OUTPATIENT
Start: 2024-05-31 | End: 2024-05-31 | Stop reason: SURG

## 2024-05-31 RX ORDER — SODIUM CHLORIDE 0.9 % (FLUSH) 0.9 %
3 SYRINGE (ML) INJECTION EVERY 12 HOURS SCHEDULED
Status: DISCONTINUED | OUTPATIENT
Start: 2024-05-31 | End: 2024-05-31 | Stop reason: HOSPADM

## 2024-05-31 RX ORDER — FENTANYL CITRATE 50 UG/ML
50 INJECTION, SOLUTION INTRAMUSCULAR; INTRAVENOUS
Status: DISCONTINUED | OUTPATIENT
Start: 2024-05-31 | End: 2024-05-31 | Stop reason: HOSPADM

## 2024-05-31 RX ORDER — PROMETHAZINE HYDROCHLORIDE 25 MG/1
25 SUPPOSITORY RECTAL ONCE AS NEEDED
Status: DISCONTINUED | OUTPATIENT
Start: 2024-05-31 | End: 2024-05-31 | Stop reason: HOSPADM

## 2024-05-31 RX ORDER — HYDRALAZINE HYDROCHLORIDE 20 MG/ML
5 INJECTION INTRAMUSCULAR; INTRAVENOUS
Status: DISCONTINUED | OUTPATIENT
Start: 2024-05-31 | End: 2024-05-31 | Stop reason: HOSPADM

## 2024-05-31 RX ORDER — DROPERIDOL 2.5 MG/ML
0.62 INJECTION, SOLUTION INTRAMUSCULAR; INTRAVENOUS
Status: DISCONTINUED | OUTPATIENT
Start: 2024-05-31 | End: 2024-05-31 | Stop reason: HOSPADM

## 2024-05-31 RX ORDER — HYDROCODONE BITARTRATE AND ACETAMINOPHEN 5; 325 MG/1; MG/1
1 TABLET ORAL ONCE AS NEEDED
Status: DISCONTINUED | OUTPATIENT
Start: 2024-05-31 | End: 2024-05-31 | Stop reason: HOSPADM

## 2024-05-31 RX ORDER — ACETAMINOPHEN 325 MG/1
650 TABLET ORAL EVERY 4 HOURS PRN
Status: DISCONTINUED | OUTPATIENT
Start: 2024-05-31 | End: 2024-05-31 | Stop reason: HOSPADM

## 2024-05-31 RX ORDER — ONDANSETRON 2 MG/ML
4 INJECTION INTRAMUSCULAR; INTRAVENOUS EVERY 6 HOURS PRN
Status: DISCONTINUED | OUTPATIENT
Start: 2024-05-31 | End: 2024-05-31 | Stop reason: HOSPADM

## 2024-05-31 RX ORDER — IPRATROPIUM BROMIDE AND ALBUTEROL SULFATE 2.5; .5 MG/3ML; MG/3ML
3 SOLUTION RESPIRATORY (INHALATION) ONCE AS NEEDED
Status: DISCONTINUED | OUTPATIENT
Start: 2024-05-31 | End: 2024-05-31 | Stop reason: HOSPADM

## 2024-05-31 RX ORDER — DEXAMETHASONE SODIUM PHOSPHATE 4 MG/ML
INJECTION, SOLUTION INTRA-ARTICULAR; INTRALESIONAL; INTRAMUSCULAR; INTRAVENOUS; SOFT TISSUE AS NEEDED
Status: DISCONTINUED | OUTPATIENT
Start: 2024-05-31 | End: 2024-05-31 | Stop reason: SURG

## 2024-05-31 RX ORDER — HEPARIN SODIUM 10000 [USP'U]/100ML
INJECTION, SOLUTION INTRAVENOUS
Status: DISCONTINUED | OUTPATIENT
Start: 2024-05-31 | End: 2024-05-31 | Stop reason: HOSPADM

## 2024-05-31 RX ORDER — LABETALOL HYDROCHLORIDE 5 MG/ML
5 INJECTION, SOLUTION INTRAVENOUS
Status: DISCONTINUED | OUTPATIENT
Start: 2024-05-31 | End: 2024-05-31 | Stop reason: HOSPADM

## 2024-05-31 RX ORDER — NALOXONE HCL 0.4 MG/ML
0.4 VIAL (ML) INJECTION AS NEEDED
Status: DISCONTINUED | OUTPATIENT
Start: 2024-05-31 | End: 2024-05-31 | Stop reason: HOSPADM

## 2024-05-31 RX ORDER — HEPARIN SODIUM 1000 [USP'U]/ML
INJECTION, SOLUTION INTRAVENOUS; SUBCUTANEOUS
Status: DISCONTINUED | OUTPATIENT
Start: 2024-05-31 | End: 2024-05-31 | Stop reason: HOSPADM

## 2024-05-31 RX ORDER — DROPERIDOL 2.5 MG/ML
0.62 INJECTION, SOLUTION INTRAMUSCULAR; INTRAVENOUS ONCE AS NEEDED
Status: DISCONTINUED | OUTPATIENT
Start: 2024-05-31 | End: 2024-05-31 | Stop reason: HOSPADM

## 2024-05-31 RX ORDER — SODIUM CHLORIDE 9 MG/ML
INJECTION, SOLUTION INTRAVENOUS
Status: DISCONTINUED | OUTPATIENT
Start: 2024-05-31 | End: 2024-05-31 | Stop reason: HOSPADM

## 2024-05-31 RX ORDER — HYDROMORPHONE HYDROCHLORIDE 1 MG/ML
0.5 INJECTION, SOLUTION INTRAMUSCULAR; INTRAVENOUS; SUBCUTANEOUS
Status: DISCONTINUED | OUTPATIENT
Start: 2024-05-31 | End: 2024-05-31 | Stop reason: HOSPADM

## 2024-05-31 RX ORDER — ACETAMINOPHEN 325 MG/1
650 TABLET ORAL EVERY 6 HOURS PRN
Status: DISCONTINUED | OUTPATIENT
Start: 2024-05-31 | End: 2024-05-31 | Stop reason: HOSPADM

## 2024-05-31 RX ORDER — MEPERIDINE HYDROCHLORIDE 25 MG/ML
12.5 INJECTION INTRAMUSCULAR; INTRAVENOUS; SUBCUTANEOUS
Status: DISCONTINUED | OUTPATIENT
Start: 2024-05-31 | End: 2024-05-31 | Stop reason: HOSPADM

## 2024-05-31 RX ORDER — SODIUM CHLORIDE 9 MG/ML
INJECTION, SOLUTION INTRAVENOUS CONTINUOUS PRN
Status: DISCONTINUED | OUTPATIENT
Start: 2024-05-31 | End: 2024-05-31 | Stop reason: SURG

## 2024-05-31 RX ORDER — LIDOCAINE HYDROCHLORIDE 10 MG/ML
INJECTION, SOLUTION EPIDURAL; INFILTRATION; INTRACAUDAL; PERINEURAL AS NEEDED
Status: DISCONTINUED | OUTPATIENT
Start: 2024-05-31 | End: 2024-05-31 | Stop reason: SURG

## 2024-05-31 RX ADMIN — DEXAMETHASONE SODIUM PHOSPHATE 8 MG: 4 INJECTION, SOLUTION INTRAMUSCULAR; INTRAVENOUS at 13:44

## 2024-05-31 RX ADMIN — SUGAMMADEX 500 MG: 100 INJECTION, SOLUTION INTRAVENOUS at 15:59

## 2024-05-31 RX ADMIN — ROCURONIUM BROMIDE 50 MG: 10 SOLUTION INTRAVENOUS at 13:33

## 2024-05-31 RX ADMIN — ROCURONIUM BROMIDE 30 MG: 10 SOLUTION INTRAVENOUS at 14:28

## 2024-05-31 RX ADMIN — ROCURONIUM BROMIDE 20 MG: 10 SOLUTION INTRAVENOUS at 15:12

## 2024-05-31 RX ADMIN — SODIUM CHLORIDE: 9 INJECTION, SOLUTION INTRAVENOUS at 13:27

## 2024-05-31 RX ADMIN — ROCURONIUM BROMIDE 20 MG: 10 SOLUTION INTRAVENOUS at 13:58

## 2024-05-31 RX ADMIN — ETOMIDATE 30 MG: 2 INJECTION, SOLUTION INTRAVENOUS at 13:33

## 2024-05-31 RX ADMIN — LIDOCAINE HYDROCHLORIDE 50 MG: 10 INJECTION, SOLUTION EPIDURAL; INFILTRATION; INTRACAUDAL; PERINEURAL at 13:33

## 2024-05-31 RX ADMIN — ACETAMINOPHEN 650 MG: 325 TABLET ORAL at 17:17

## 2024-05-31 NOTE — ANESTHESIA PREPROCEDURE EVALUATION
Anesthesia Evaluation                  Airway   Mallampati: I  TM distance: >3 FB  Neck ROM: full  No difficulty expected  Dental      Pulmonary    (+) asthma,sleep apnea  Cardiovascular     ECG reviewed    (+) pacemaker ICD, pacemaker, hypertension, CAD, dysrhythmias Atrial Fib, CHF ,  carotid artery disease    ROS comment: Ef 31-35%    Neuro/Psych  (+) headaches  GI/Hepatic/Renal/Endo      Musculoskeletal     Abdominal    Substance History      OB/GYN          Other   arthritis,                   Anesthesia Plan    ASA 4     general     intravenous induction     Anesthetic plan, risks, benefits, and alternatives have been provided, discussed and informed consent has been obtained with: patient.    Plan discussed with CRNA.    CODE STATUS:

## 2024-05-31 NOTE — OP NOTE
"      Cardiac Electrophysiology Procedure Note      [facility]    CATHETER ABLATION OF VENTRICULAR TACHYCARDIA / PREMATURE VENTRICULAR COMPLEXES    PROCEDURES PERFORMED:    Catheter ablation of ventricular tachycardia / premature ventricular complexes   Full diagnostic EP study  3D electroanatomic mapping  drug infusion / programmed pacing  Intracadiac Echocardiography  Left ventricular pacing and recording  Retrograde approach to LV access  Transeptal puncture    PREPROCEDURAL DIAGNOSES:    1.  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  Nonischemic cardiomyopathy  3.  Transvenous ICD    POST PROCEDURE DIANGOSES:    1.  As above.  2.  RV outflow tract PVC    INDICATION FOR PROCEDURE:  Briefly, Bill Umana is a 59 y.o. year old male with a history of highly symptomatic RVF lower tract morphology PVCs in bigeminal pattern.  He has not responded to medical therapy adequately with sotalol and presents for catheter ablation today electively as an outpatient.    DESCRIPTION OF TARGETED CLINICAL VENTRICULAR TACHYCARDIA OR PVCS:    1.  A left bundle branch morphology PVC with a transition in lead V3 a strongly inferior axis strongly negative in lead aVL and aVF and small r wave and deep S in lead I  2.  Pleomorphic additional and infrequent PVCs of many sites of origin      PT/INR:  No results found for: \"LABPROT\", \"INR\"  PTT:  No results found for: \"APTT\"    CBC:   WBC   Date Value Ref Range Status   05/31/2024 8.02 3.40 - 10.80 10*3/mm3 Final     RBC   Date Value Ref Range Status   05/31/2024 5.13 4.14 - 5.80 10*6/mm3 Final     Hemoglobin   Date Value Ref Range Status   05/31/2024 15.3 13.0 - 17.7 g/dL Final     Hematocrit   Date Value Ref Range Status   05/31/2024 45.7 37.5 - 51.0 % Final     MCV   Date Value Ref Range Status   05/31/2024 89.1 79.0 - 97.0 fL Final     RDW   Date Value Ref Range Status   05/31/2024 12.7 12.3 - 15.4 % Final     Platelets   Date " "Value Ref Range Status   05/31/2024 218 140 - 450 10*3/mm3 Final     BMP:     Sodium   Date Value Ref Range Status   05/31/2024 138 136 - 145 mmol/L Final     Potassium   Date Value Ref Range Status   05/31/2024 4.1 3.5 - 5.2 mmol/L Final     Chloride   Date Value Ref Range Status   05/31/2024 103 98 - 107 mmol/L Final     CO2   Date Value Ref Range Status   05/31/2024 26.0 22.0 - 29.0 mmol/L Final     BUN   Date Value Ref Range Status   05/31/2024 15 8 - 23 mg/dL Final     Creatinine   Date Value Ref Range Status   05/31/2024 0.88 0.76 - 1.27 mg/dL Final     eGFR   Date Value Ref Range Status   05/31/2024 98.4 >60.0 mL/min/1.73 Final       Vital Signs: /65   Pulse 82   Temp 97 °F (36.1 °C)   Resp 18   Ht 190.5 cm (75\")   Wt 101 kg (223 lb 3.2 oz)   SpO2 93%   BMI 27.90 kg/m²  O2 Flow Rate (L/min): 2 L/min   Admit Weight:  101 kg (223 lb 3.2 oz)  BMI: Body mass index is 27.9 kg/m².    PROCEDURE NARRATIVE:    The patient was able to give written informed consent after revisiting the key portions of the risk versus benefit profile of the procedure.  This discussion was framed by our lengthy conversations  (please see our detailed notes).  Patient verbalized strong understanding of this discussion and a strong desire to proceed with the procedure.  Please note that this detailed informed consent process utilized mutual and shared decision making process between all parties involved, principally the physician and patient, but also potentially with input from the patient's selected family and friends.    The patient was brought to the EP laboratory in the post absorptive state.    The patient was electively intubated for the procedure and given a general anesthetic by colleagues from anesthesia.  Please see the detailed anesthesia records.    The patient was then prepared and draped in a routing sterile fashion.  Seldinger access was obtained and the following sheaths were advanced in an over the wire " fashion to the heart:    1) SL 0 sheath right common femoral vein/decapolar physiology catheter placed to the coronary sinus for left atrial pacing recording  2) deflectable long sheath right common femoral vein/Sharon 64 electrode electrophysiology catheter/ThermoCool RMT ablation catheter  3) short 11 Burkinan sheath right common femoral vein/10 Burkinan phased-array intracardiac echocardiography probe  4) 6 Burkinan short sheath left common femoral vein/5 electrode Srinivasan catheter placed to the right ventricular apex for pacing and recording    The patient was anticoagulated with intravenous heparin (initial bolus and then continuous infusion) with a goal ACT of between 350 and 400 seconds.    A 10 Burkinan phased array ICE catheter was placed into the right atrium and right ventricle.  This was used for monitoring of the pericardial space, monitoring of the ablation catheter position within the heart and monitoring of other cardiac structures.    A 64 electrode Sharon diagnostic electrophysiology catheter and a [employer] Rios 3.5 mm ThermoCool RMT irrigated tip ablation catheter were used for pacing and recording in the right ventricle    We used the [employer] 3D electroanatomic mapping system in conjunction with these catheters to construct an electroanatomic shell of the ventricle and it is relevant structures.    An EP study was performed.  Data obtained from this is listed in the below table:    Initial Study    Isuprel Washout Study           drive train / burst extrastim    QRS 88    Rhythm          Atrial CL      R-R 894    Ventricular CL      AH 72    AVBCL      HV 63    AVNERP       drive train / burst extrastim   Slow Pway ERP      Rhythm     Fast Pway ERP      Atrial CL     VABCL conc? /  Dec?      Ventricular CL     VAERP      AVBCL 280    AERP      AVNERP 550 220   VERP      Slow Pway ERP     AP antegrade ERP      Fast Pway ERP     AP retrograde ERP      VABCL conc? /  Dec? 340          VAERP  450 240  Final Study      AERP      drive train / burst extrastim    VERP     Rhythm      AP antegrade ERP     Atrial CL      AP retrograde ERP     Ventricular CL           AVBCL     Isuprel Dose =      AVNERP       drive train / burst extrastim   Slow Pway ERP      Rhythm     Fast Pway ERP      Atrial CL     VABCL conc? /  Dec?      Ventricular CL     VAERP      AVBCL     AERP      AVNERP     VERP      Slow Pway ERP     AP antegrade ERP      Fast Pway ERP     AP retrograde ERP      VABCL conc? /  Dec?           VAERP           AERP           VERP           AP antegrade ERP           AP retrograde ERP                           DESCRIPTION OF ARRHYTHMIAS INDUCED:    1.  A left bundle branch morphology PVC with a transition in lead V3 a strongly inferior axis strongly negative in lead aVL and aVF and small r wave and deep S in lead I.    High-density electroanatomic mapping was performed during sinus rhythm as well as also during PVC.     Please also note that proprietary Vektor noninvasive mapping was performed of the PVC.    Both endocardial and noninvasive mapping suggested a outflow tract site of origin.  We felt it was likely to be initially most beneficial to map the right ventricle.  This was performed routinely and safely.  Any 100 activation points were obtained of the right ventricular outflow tract.    Site of origin was inscribed at a area of the posterior right ventricular outflow tract adjacent to the left coronary cusp and left ventricular outflow tract in an area that was just underneath the pulmonic valve.  In this area QS unipolar recordings were obtained during the PVC.  Bipolar activation preceded onset of the PVC by 43 ms.  Pace mapping here just above ventricular capture threshold revealed a 12 out of 12 pace map.  During the PVC there was a very low amplitude fractionated local bipolar signal.  PVCs were occurring spontaneously with variable frequency, sometimes in bigeminy.      DESCRIPTION OF  THE ABLATION STRATEGY:    Remote magnetic navigation was performed.  This was using the stereotaxis system.  ThermoCool RMT catheter was safely manipulated with remote magnetic navigation to the site of origin.  Catheter ablation was performed with power settings limited to 47 W.       Delivered in 62nd increments.  The entire area was consolidated.  The endpoint was noncapture from the ablation catheter of the area of ablation.    Very quickly during initiation of radiofrequency ablation of the PVCs subsided.    10 mcg/min of isuprel was given to induce arrhythmia.  Arrhythmia was not induced with this maneuver.    Patient was observed for 45 minutes without a single clinical PVC.  Please note that there were infrequent pleomorphic PVCs some from the left ventricle some from the right ventricle various regions.  These were mapped with the [employer] mapping system.  At least for these PVCs were localized in the immediate area of the His bundle along the right ventricular basal septum, other still were noted to be at the lateral and inferior lateral aspect of the mitral valve annulus.  Again these were infrequent and were not the clinical PVC.    Catheters and sheaths were removed from the left atrium and heparin was discontinued.  Protamine was given to reverse the heparin.    The ICE catheter revealed that there was no pericardial effusion.    Catheters and sheaths were then removed from the body.  Hemostasis was achieved with manual pressure after reversal of anticoagulation with protamine.  The patient was extubated in routine fashion and transferred to the recovery area in stable condition.    Vascade closure devices x 4.  Bedrest 2 hours.  Home tonight.      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  Device was confirmed to be programmed on prior to the patient leaving the room with DDD pacing  mode.  Right ventricular posterior outflow tract PVC.  This was successfully ablated.  Nontargeted infrequent PVCs of several morphologies were noted, consistent with the patient's prior history, also consistent with the patient's history these were nonclinical and we deferred additional ablation for these pleomorphic scant PVCs.    POST PROCEDURAL PLAN:    1.  Report was called to the recovery nurse responsible for the patients care.  Medications were reconciled, and key changes in medications include: No changes  The patient will be seen at our office per routine follow up.  Watchman in situ /no indication for anticoagulation with the patient's history of A-fib.    Lobo Downs DO, FACC, [facility]  Cardiac Electrophysiologist  [facility] / [facility]

## 2024-05-31 NOTE — H&P
[facility] Electrophysiology    Date of Hospital Visit: 24    Place of Service: [facility]    Patient Name: Bill Umana  :1965      Primary Care Provider: Kim Goff APRN Cheif Complaint EP: PVC's      Problem List:  Active Hospital Problems    Diagnosis  POA    **Nonsustained ventricular tachycardia [I47.29]  Yes     Priority: High     interrogation demonstrates 4 episodes of nonsustained ventricular tachycardia and one episode of ventricular tachycardia converted with ATP without defibrillation  patient on Sotalol for ventricular tachycardia followed by EP services      Paroxysmal atrial fibrillation [I48.0]  Yes     Priority: High     LAAO 21  JORGE L, 2022: 24 mm watchman left atrial appendage occlusion device in place.  Device is well-seated with no peridevice flow.  No device associated thrombus.      Nonischemic dilated cardiomyopathy [I42.0]  Yes     Priority: High     S/P AICD implant ([employer])   Cardiac catheterization 2018 with normal coronaries and an EF estimated at 40-45%  EF by echocardiogram 2019 at 35 to 40% with grade 1 diastolic dysfunction  Echocardiogram, 2021: Mild concentric LVH.  Moderate global hypokinesis.  EF 35-40%.  Diastolic dysfunction.  Left atrium moderately dilated.  Mild aortic valve sclerosis without stenosis.  JORGE L, 2022:  EF 26 to 30%.  LV systolic function severely decreased.  JORGE L, 3-10-23: Left ventricular systolic function is moderately decreased. Left ventricular ejection fraction appears to be 31 - 35%.   ICD generator change, 3/19/2024:Implantation of dual-chamber ICD model D121       PVC (premature ventricular contraction) [I49.3]  Yes     Priority: Medium    Chronic systolic heart failure [I50.22]  Yes     Priority: Medium    Presence of Watchman left atrial appendage closure device [Z95.818]  Yes     Priority: Medium    Orthostatic hypotension [I95.1]  Yes     Priority:  Medium    AICD (automatic cardioverter/defibrillator) present [Z95.810]  Yes     Priority: Medium     S/P AICD implant ([employer])       Long term current use of antiarrhythmic drug [Z79.899]  Not Applicable     Priority: Low    LISA (obstructive sleep apnea) [G47.33]  Yes     Priority: Low    Hyperlipidemia [E78.5]  Yes     Priority: Low    Essential hypertension [I10]  Yes     Priority: Low    Pulmonary HTN [I27.20]  Yes     1. JORGE L 3-10-23:     Left ventricular systolic function is moderately decreased. Left ventricular ejection fraction appears to be 31 - 35%.    The left ventricular cavity is mildly-moderately dilated    Moderate tricuspid valve regurgitation is present. Estimated right ventricular systolic pressure from tricuspid regurgitation is mildly elevated (35-45 mmHg).      Bilateral carotid artery stenosis [I65.23]  Yes     16 to 49% bilateral internal carotid artery stenosis by duplex November 2019      History of SDH (subdural hematoma) [S06.5XAA]  Yes     [facility] 7-23-21         History of Present Illness:  59-year-old male with nonsustained VT, high PVC burden, paroxysmal atrial fibrillation and nonischemic dilated cardiomyopathy.  He was recently seen in the electrophysiology clinic and found to have severe fatigue.  EKG showed ventricular bigeminy with outflow tract morphology PVCs.        No Known Allergies    Current Outpatient Medications   Medication Instructions    FLUoxetine (PROZAC) 40 mg, Oral, Daily    FLUoxetine (PROZAC) 20 mg, Oral, Daily    furosemide (LASIX) 80 mg, Oral, Daily    gabapentin (NEURONTIN) 300 MG capsule 2 Times Daily - RT    ibuprofen (ADVIL,MOTRIN) 800 mg, Oral, Every 6 Hours PRN, prn    linaclotide (LINZESS) 72 mcg, Oral, Every Other Day    meclizine (ANTIVERT) 25 mg, 3 Times Daily PRN    methocarbamol (ROBAXIN) 500 mg, Oral, 2 Times Daily    oxyCODONE-acetaminophen (PERCOCET) 5-325 MG per tablet 1 tablet, Oral, Every 4 Hours PRN    pantoprazole (PROTONIX) 40 MG EC  tablet TAKE 1 TABLET EVERY DAY    potassium chloride (KLOR-CON M10) 10 MEQ CR tablet TAKE 1 TABLET EVERY DAY    ramipril (ALTACE) 2.5 mg, Oral, Nightly    simvastatin (ZOCOR) 20 MG tablet TAKE 1 TABLET EVERY NIGHT    sotalol (BETAPACE) 120 mg, Oral, 2 Times Daily    torsemide (DEMADEX) 20 mg, Oral, Daily    traMADol (ULTRAM) 50 mg, Oral, 2 Times Daily    traZODone (DESYREL) 100 mg, Oral, Nightly           Social History     Socioeconomic History    Marital status:    Tobacco Use    Smoking status: Never     Passive exposure: Past    Smokeless tobacco: Never   Vaping Use    Vaping status: Never Used    Passive vaping exposure: Yes   Substance and Sexual Activity    Alcohol use: Never    Drug use: Never    Sexual activity: Yes     Partners: Female       Family History   Problem Relation Age of Onset    Heart attack Mother     Heart failure Mother     Hypertension Mother     Colon cancer Mother     Heart disease Father     Heart failure Father     Pancreatic cancer Sister     Lung cancer Sister     Anemia Maternal Grandmother        REVIEW OF SYSTEMS:   Review of Systems   Constitutional: Positive for malaise/fatigue. Negative for diaphoresis and weight gain.   Cardiovascular:  Negative for chest pain, claudication, dyspnea on exertion, irregular heartbeat, leg swelling, orthopnea, palpitations, paroxysmal nocturnal dyspnea and syncope.   Respiratory:  Negative for cough, shortness of breath, sleep disturbances due to breathing and wheezing.    Hematologic/Lymphatic: Negative for bleeding problem.   Musculoskeletal:  Negative for muscle cramps, muscle weakness and myalgias.   Gastrointestinal:  Negative for heartburn.   Neurological:  Negative for weakness.            Objective:  114/83, Sat 96%, HR 73      Vitals reviewed.   Constitutional:       Appearance: Well-developed.   Pulmonary:      Effort: Pulmonary effort is normal. No respiratory distress.      Breath sounds: Normal breath sounds. No wheezing. No  rales.      Comments: Bases clear  Chest:      Chest wall: Not tender to palpatation.   Cardiovascular:      Normal rate. Regular rhythm.      Murmurs: There is no murmur.      No gallop.  No click. No rub.   Pulses:     Intact distal pulses.   Edema:     Peripheral edema absent.   Musculoskeletal: Normal range of motion.       Lab Review:               CrCl cannot be calculated (Patient's most recent lab result is older than the maximum 30 days allowed.).  Lab Results   Component Value Date    INR 1.99 (H) 12/16/2021    INR 2.7 (H) 06/24/2021    PROTIME 21.8 (H) 12/16/2021        Assessment:   Highly symptomatic outflow tract PVCs        Plan:   PVC ablation        ROSEANNE Lindo

## 2024-05-31 NOTE — ANESTHESIA POSTPROCEDURE EVALUATION
Patient: Bill Umana    Procedure Summary       Date: 05/31/24 Room / Location: [facility] CATH/EP LAB E / [facility] EP INVASIVE LOCATION    Anesthesia Start: 1323 Anesthesia Stop:     Procedure: PVC RFA, Rythmia, STX, Vektor, GA, hold Sotalol 3 days Diagnosis:       Nonsustained ventricular tachycardia      (PVC)    Providers: Lobo Downs DO Provider: Kaveh German MD    Anesthesia Type: general ASA Status: 4            Anesthesia Type: general    Vitals  Vitals Value Taken Time   /84 05/31/24 1610   Temp 97 °F (36.1 °C) 05/31/24 1610   Pulse 90 05/31/24 1610   Resp 15 05/31/24 1610   SpO2 95 % 05/31/24 1610           Post Anesthesia Care and Evaluation    Patient location during evaluation: PACU  Patient participation: complete - patient participated  Level of consciousness: awake and alert  Pain management: adequate    Airway patency: patent  Anesthetic complications: No anesthetic complications  PONV Status: none  Cardiovascular status: hemodynamically stable and acceptable  Respiratory status: nonlabored ventilation, acceptable and nasal cannula  Hydration status: acceptable

## 2024-05-31 NOTE — ANESTHESIA PROCEDURE NOTES
Airway  Urgency: elective    Date/Time: 5/31/2024 1:38 PM  Airway not difficult    General Information and Staff    Patient location during procedure: OR  CRNA/CAA: Jacqueline Paul CRNA    Indications and Patient Condition  Indications for airway management: airway protection    Preoxygenated: yes  MILS not maintained throughout  Mask difficulty assessment: 1 - vent by mask    Final Airway Details  Final airway type: endotracheal airway      Successful airway: ETT  Cuffed: yes   Successful intubation technique: video laryngoscopy  Facilitating devices/methods: intubating stylet  Endotracheal tube insertion site: oral  Blade: Haro  Blade size: 4  ETT size (mm): 7.5  Cormack-Lehane Classification: grade I - full view of glottis  Placement verified by: chest auscultation and capnometry   Inital cuff pressure (cm H2O): 7  Measured from: lips  ETT/EBT  to lips (cm): 21  Number of attempts at approach: 1  Assessment: lips, teeth, and gum same as pre-op and atraumatic intubation    Additional Comments  Negative epigastric sounds, Breath sound equal bilaterally with symmetric chest rise and fall

## 2024-06-03 ENCOUNTER — CALL CENTER PROGRAMS (OUTPATIENT)
Dept: CALL CENTER | Facility: HOSPITAL | Age: 59
End: 2024-06-03
Payer: MEDICARE

## 2024-06-03 ENCOUNTER — TELEPHONE (OUTPATIENT)
Dept: CARDIOLOGY | Facility: CLINIC | Age: 59
End: 2024-06-03
Payer: MEDICARE

## 2024-06-03 LAB
ACT BLD: 287 SECONDS (ref 82–152)
ACT BLD: 331 SECONDS (ref 82–152)
ACT BLD: 342 SECONDS (ref 82–152)

## 2024-06-03 NOTE — OUTREACH NOTE
PCI/Device Survey      Flowsheet Row Responses   Facility patient discharged from? [place]   Procedure date 05/31/24   Procedure (if device, specify in description) Ablation   Performing MD Dr. Lobo Downs   Attempt successful? Yes   Call start time 1030   Call end time 1035   Person spoke with today (if not patient) and relationship Wife   Nursing interventions Patient education provided   Is the patient taking prescribed medications: None   Nursing intervention Reminded to continue to take prescribed medications   Medication comments Per AVS no medications changes, wife v/u.   Does the patient have any of the following symptoms related to the cath/surgical site? --  [R. groin and L. groin are without s/sx of infection or bruising, wife reports that the pt c/o soreness.]   Nursing intervention Patient education provided   If the patient is a current smoker, are they able to teach back resources for cessation? Not a smoker   Did the patient feel prepared to go home on the same day as the procedure? Yes  [Per wife's report, they were disappointed that the pt was told to be there prior to 10am but was not taken to procedure until after 12.]   Is the patient satisfied with the same day discharge process? Yes   PCI/Device call completed Yes            Lis LFORENCE - Registered Nurse

## 2024-06-21 NOTE — Clinical Note
PREVIOUS GENERATOR This was a shared visit with the [facility]. I reviewed and verified the documentation.

## 2024-08-23 ENCOUNTER — TELEPHONE (OUTPATIENT)
Dept: CARDIOLOGY | Facility: CLINIC | Age: 59
End: 2024-08-23
Payer: MEDICARE

## 2024-08-23 NOTE — TELEPHONE ENCOUNTER
Per [email or web address] remote cardiac device transmission received today, 8/23/2024:     AF burden 12% since 5/31/2024  Ongoing in presenting EGM & since 8/22/2024 @ 6:44 PM.   V-rates poorly controlled -see histogram below.     AF w/RVR-(VT-1 & SVT alerts)  Episodes: 40  Longest episode: 4 min, 40 seconds.   V-rates: 166-199 bpm.     I spoke w/pt who reports increased shortness of breath, abd swelling, wt gain of 5 pounds. Pt reports compliance with cardiac Rx including sotalol & bumetanide.  Pt is s/p PVC ablation 5/31/2024.

## 2024-08-23 NOTE — TELEPHONE ENCOUNTER
I called pt to discuss an appointment for consult regarding Afib ablation. Pt states he's very fatigued, wakes up from sleep with diaphoresis, currently has some chest tightness. Advised pt to present to his local emergency room for evaluation.     Pt is amenable to an appointment to discuss an Afib ablation.

## 2024-08-26 ENCOUNTER — TELEPHONE (OUTPATIENT)
Dept: CARDIOLOGY | Facility: CLINIC | Age: 59
End: 2024-08-26

## 2024-10-07 ENCOUNTER — OFFICE VISIT (OUTPATIENT)
Dept: CARDIOLOGY | Facility: CLINIC | Age: 59
End: 2024-10-07
Payer: MEDICARE

## 2024-10-07 VITALS
HEIGHT: 75 IN | SYSTOLIC BLOOD PRESSURE: 92 MMHG | OXYGEN SATURATION: 99 % | DIASTOLIC BLOOD PRESSURE: 70 MMHG | HEART RATE: 71 BPM | WEIGHT: 219.8 LBS | BODY MASS INDEX: 27.33 KG/M2

## 2024-10-07 DIAGNOSIS — I48.0 PAROXYSMAL ATRIAL FIBRILLATION: Primary | ICD-10-CM

## 2024-10-07 PROCEDURE — 3074F SYST BP LT 130 MM HG: CPT | Performed by: PHYSICIAN ASSISTANT

## 2024-10-07 PROCEDURE — 93283 PRGRMG EVAL IMPLANTABLE DFB: CPT | Performed by: PHYSICIAN ASSISTANT

## 2024-10-07 PROCEDURE — 93000 ELECTROCARDIOGRAM COMPLETE: CPT | Performed by: PHYSICIAN ASSISTANT

## 2024-10-07 PROCEDURE — 99214 OFFICE O/P EST MOD 30 MIN: CPT | Performed by: PHYSICIAN ASSISTANT

## 2024-10-07 PROCEDURE — 3078F DIAST BP <80 MM HG: CPT | Performed by: PHYSICIAN ASSISTANT

## 2024-10-07 RX ORDER — NITROGLYCERIN 80 MG/1
1 PATCH TRANSDERMAL DAILY
COMMUNITY
End: 2024-10-07 | Stop reason: ALTCHOICE

## 2024-10-07 RX ORDER — ATORVASTATIN CALCIUM 40 MG/1
40 TABLET, FILM COATED ORAL DAILY
COMMUNITY

## 2024-10-07 RX ORDER — METOPROLOL SUCCINATE 25 MG/1
25 TABLET, EXTENDED RELEASE ORAL DAILY
Qty: 30 TABLET | Refills: 11 | Status: SHIPPED | OUTPATIENT
Start: 2024-10-07

## 2024-10-07 RX ORDER — HYDROCODONE BITARTRATE AND ACETAMINOPHEN 5; 325 MG/1; MG/1
TABLET ORAL DAILY
COMMUNITY

## 2024-10-07 NOTE — PROGRESS NOTES
Patient ID: Bill Umana is a 59 y.o. male.    Kim Goff, APRN    Cheif Complaint EP: Atrial Fibrillation, NSVT, Nonischemic cardiomyopathy, and ICD Check    PROBLEM LIST:  Patient Active Problem List    Diagnosis Date Noted    Nonsustained ventricular tachycardia 03/23/2018     Priority: High     Note Last Updated: 11/29/2021     interrogation demonstrates 4 episodes of nonsustained ventricular tachycardia and one episode of ventricular tachycardia converted with ATP without defibrillation  patient on Sotalol for ventricular tachycardia followed by EP services      Nonischemic dilated cardiomyopathy 02/19/2018     Priority: High     Note Last Updated: 4/22/2024     S/P AICD implant ([employer])   Cardiac catheterization March 2018 with normal coronaries and an EF estimated at 40-45%  EF by echocardiogram November 2019 at 35 to 40% with grade 1 diastolic dysfunction  Echocardiogram, 7/2/2021: Mild concentric LVH.  Moderate global hypokinesis.  EF 35-40%.  Diastolic dysfunction.  Left atrium moderately dilated.  Mild aortic valve sclerosis without stenosis.  JORGE L, 2/9/2022:  EF 26 to 30%.  LV systolic function severely decreased.  JORGE L, 3-10-23: Left ventricular systolic function is moderately decreased. Left ventricular ejection fraction appears to be 31 - 35%.   ICD generator change, 3/19/2024:Implantation of dual-chamber ICD model D121       Long term current use of antiarrhythmic drug 02/26/2024    PVC (premature ventricular contraction) 12/27/2023     Note Last Updated: 8/30/2024     Catheter ablation of ventricular tachycardia and premature ventricular complexes.  5/31/2024      LISA (obstructive sleep apnea) 05/30/2023    Depressive disorder 04/27/2023    Chronic systolic heart failure 04/27/2023    Presence of Watchman left atrial appendage closure device 03/24/2023    Pulmonary HTN 03/24/2023     Note Last Updated: 3/24/2023     1. JORGE L 3-10-23:     Left ventricular systolic  function is moderately decreased. Left ventricular ejection fraction appears to be 31 - 35%.    The left ventricular cavity is mildly-moderately dilated    Moderate tricuspid valve regurgitation is present. Estimated right ventricular systolic pressure from tricuspid regurgitation is mildly elevated (35-45 mmHg).      Chronic pain disorder 09/20/2022    Lumbar post-laminectomy syndrome 09/20/2022    Bilateral carotid artery stenosis 11/29/2021     Note Last Updated: 11/29/2021     16 to 49% bilateral internal carotid artery stenosis by duplex November 2019      History of SDH (subdural hematoma) 11/29/2021     Note Last Updated: 11/29/2021     [facility] 7-23-21      Chronic fatigue 12/04/2019    Orthostatic hypotension 02/13/2019    AICD (automatic cardioverter/defibrillator) present 03/05/2018     Note Last Updated: 11/29/2021     S/P AICD implant ([employer])       Paroxysmal atrial fibrillation 03/05/2018     Note Last Updated: 3/21/2022     LAAO 12-16-21  JORGE L, 2/9/2022: 24 mm watchman left atrial appendage occlusion device in place.  Device is well-seated with no peridevice flow.  No device associated thrombus.      Hyperlipidemia 03/05/2018    Essential hypertension 03/05/2018               History of Present Illness  59 year old male presents today for follow-up with a history of paroxysmal atrial fibrillation with a left atrial appendage occlusion device 2021 due to patient intolerance to anticoagulation after a subdural hematoma..  Patient also has NSVT, nonischemic dilated cardiomyopathy and frequent PVCs on long-term antiarrhythmic.  He is status post VT ablation Dr. Downs 5/31/2024.  This is his first appointment back since that ablation.  Unfortunately he has been admitted to  for ischemic stroke due to left M3 thrombus suggestive of cardioembolic etiology.  In addition he was found to have unprovoked DVT and PE.  He has since started on Eliquis therapy for this event.  He remains on sotalol therapy  for treatment of atrial fibrillation. HE recently had a JORGE L with [facility] and the family tells me this was stable.  He has had no ICD shocks dizziness near syncope CP he is working with PT and OT to recover from his left-sided hemiparesis and dysarthria.      No Known Allergies      Current Outpatient Medications:     apixaban (ELIQUIS) 5 MG tablet tablet, Take 1 tablet by mouth 2 (Two) Times a Day., Disp: , Rfl:     atorvastatin (LIPITOR) 40 MG tablet, Take 1 tablet by mouth Daily., Disp: , Rfl:     FLUoxetine (PROzac) 40 MG capsule, Take 1 capsule by mouth Daily., Disp: , Rfl:     gabapentin (NEURONTIN) 300 MG capsule, 2 (Two) Times a Day., Disp: , Rfl: 2    ibuprofen (ADVIL,MOTRIN) 800 MG tablet, Take 1 tablet by mouth Every 6 (Six) Hours As Needed for Moderate Pain. prn, Disp: , Rfl:     linaclotide (LINZESS) 72 MCG capsule capsule, Take 1 capsule by mouth Every Other Day., Disp: , Rfl:     pantoprazole (PROTONIX) 40 MG EC tablet, TAKE 1 TABLET EVERY DAY, Disp: 90 tablet, Rfl: 1    potassium chloride (KLOR-CON M10) 10 MEQ CR tablet, TAKE 1 TABLET EVERY DAY (Patient taking differently: 2 tablets.), Disp: 90 tablet, Rfl: 3    ramipril (ALTACE) 2.5 MG capsule, Take 1 capsule by mouth Every Night., Disp: , Rfl:     simvastatin (ZOCOR) 20 MG tablet, TAKE 1 TABLET EVERY NIGHT, Disp: 90 tablet, Rfl: 3    sotalol (BETAPACE) 120 MG tablet, Take 1 tablet by mouth 2 (Two) Times a Day., Disp: 30 tablet, Rfl: 5    torsemide (DEMADEX) 20 MG tablet, Take 1 tablet by mouth Daily., Disp: 90 tablet, Rfl: 3    traMADol (ULTRAM) 50 MG tablet, Take 1 tablet by mouth 2 (Two) Times a Day., Disp: , Rfl:     traZODone (DESYREL) 100 MG tablet, Take 1 tablet by mouth Every Night., Disp: , Rfl:     empagliflozin (Jardiance) 10 MG tablet tablet, Take 1 tablet by mouth Daily., Disp: 30 tablet, Rfl: 6    HYDROcodone-acetaminophen (NORCO) 5-325 MG per tablet, Take  by mouth Daily., Disp: , Rfl:     metoprolol succinate XL  "(TOPROL-XL) 25 MG 24 hr tablet, Take 1 tablet by mouth Daily., Disp: 30 tablet, Rfl: 11    oxyCODONE-acetaminophen (PERCOCET) 5-325 MG per tablet, Take 1 tablet by mouth Every 4 (Four) Hours As Needed for Pain. (Patient not taking: Reported on 10/7/2024), Disp: , Rfl:      .    Objective:     BP 92/70 (BP Location: Right arm, Patient Position: Sitting, Cuff Size: Adult)   Pulse 71   Ht 190.5 cm (75\")   Wt 99.7 kg (219 lb 12.8 oz)   SpO2 99%   BMI 27.47 kg/m²    Body mass index is 27.47 kg/m².     Vitals reviewed.   Constitutional:       Appearance: Well-developed.   Pulmonary:      Effort: Pulmonary effort is normal. No respiratory distress.      Breath sounds: Normal breath sounds. No wheezing. No rales.      Comments: Bases clear  Chest:      Chest wall: Not tender to palpatation.   Cardiovascular:      Bradycardia and tachycardia present. Regular rhythm.      Murmurs: There is no murmur.      No gallop.  No click. No rub.   Pulses:     Intact distal pulses.   Edema:     Peripheral edema absent.   Musculoskeletal: Normal range of motion.       Lab Review:     Lab Results   Component Value Date    GLUCOSE 147 (H) 09/06/2024    BUN 15 05/31/2024    CREATININE 0.88 05/31/2024    EGFRRESULT 84 11/23/2022    EGFR 98.4 05/31/2024    BCR 17.0 05/31/2024    K 3.5 (L) 09/06/2024    CO2 26.0 05/31/2024    CALCIUM 9.0 05/31/2024    ALBUMIN 3.6 06/26/2021    BILITOT 0.4 06/26/2021    AST 24 06/26/2021    ALT 32 06/26/2021     Lab Results   Component Value Date    WBC 10.28 09/13/2024    HGB 13.5 (L) 09/13/2024    HCT 38.8 (L) 09/13/2024    MCV 90 09/13/2024     09/13/2024     Lab Results   Component Value Date    TSH 2.610 11/23/2022             ECG 12 Lead    Date/Time: 10/7/2024 3:10 PM  Performed by: Cosme Vernon PA    Authorized by: Cosme Vernon PA  Comparison: compared with previous ECG from 4/17/2024  Similar to previous ECG  Rhythm: sinus rhythm  Rate: normal  Conduction: conduction normal  ST " Segments: ST segments normal  T Waves: T waves normal  QRS axis: normal    Clinical impression: normal ECG                   [employer] ICD: Mode DDD rate 60 a paced 27% RV paced 27%.  Normal thresholds impedance.  Total burden of A-fib 30%.  Most during recent hospitalization.  No VT ICD shocks.  Most VT recorded episodes are due to A-fib with RVR.  Battery voltage is 13 years.  Assessment:      Diagnosis Plan   1. Paroxysmal atrial fibrillation  Sotalol for rhythm control.  Recent A-fib in the setting of pneumonia, DVT PE and stroke.  Past few days sinus rhythm with improved rate control upon adding Toprol therapy.      2. Nonsustained ventricular tachycardia  Ablation of PVCs, VT Dr. Downs 5/31/2024, no recurrent VT by device interrogation today.      3. Nonischemic dilated cardiomyopathy  Stable.  Clinically euvolemic.  PVC interaction as above.      4. Presence of Watchman left atrial appendage closure device  24 mm watchman in situ.  Left atrial pended closure documented..  Follow-up JORGE L [facility] September 2024 no signs of residual left atrial clot or leak.      5. AICD (automatic cardioverter/defibrillator) present  Dual-chamber [employer] transvenous ICD system in situ.  No VT, A-fib burden in the setting of recent above issues.      6. Long term current use of antiarrhythmic drug  Sotalol.  EKG today QTc 473 stable   7.       DVTs, PE admission to : Eliquis therapy.     Stable CV course.  Unfortunate gentleman status post recent CVA admission to  in the setting of pneumonia, PE and DVT.  He is recovering well from his stroke.  He did have a thrombectomy at a timely fashion.  He has had a follow-up JORGE L revealing stable no leak or thrombus.  A-fib has been improved the past few days with addition of Toprol therapy.  Will continue to monitor if recurrent A-fib we discussed options of further treatment such as Tikosyn versus amiodarone therapy and lieu of sotalol.  I do not  think he is a good PVA candidate.  In addition due to severe cardiomyopathy will add Jardiance to current regiment further treatment heart failure.  He he will revisit with Dr. Mcdonough for continued management of cardiomyopathy and heart failure symptoms.  Return follow-up with Dr. Downs in [place] as scheduled.    Electronically signed by ROSEANNE Villar, 10/07/24, 10:27 AM EDT.

## 2024-10-21 RX ORDER — RAMIPRIL 2.5 MG/1
2.5 CAPSULE ORAL DAILY
Qty: 90 CAPSULE | Refills: 3 | Status: SHIPPED | OUTPATIENT
Start: 2024-10-21

## 2024-11-02 DIAGNOSIS — E78.2 MIXED HYPERLIPIDEMIA: ICD-10-CM

## 2024-11-13 RX ORDER — SIMVASTATIN 20 MG
TABLET ORAL
Qty: 90 TABLET | Refills: 3 | OUTPATIENT
Start: 2024-11-13

## 2024-11-13 NOTE — TELEPHONE ENCOUNTER
Called patient to verify what statin  he is currently taking, no answer, left voice message requesting return call.

## 2024-12-03 NOTE — Clinical Note
No in lab complications [Subsequent Evaluation] : a subsequent evaluation for [FreeTextEntry2] : ears/nose

## 2025-01-24 ENCOUNTER — TELEPHONE (OUTPATIENT)
Dept: CARDIOLOGY | Facility: CLINIC | Age: 60
End: 2025-01-24
Payer: MEDICARE

## 2025-01-24 NOTE — TELEPHONE ENCOUNTER
I called to speak to  regarding his missed latitude reading. There was no answer so I left a message with my name and number for him to call for assistance.

## 2025-01-27 RX ORDER — SOTALOL HYDROCHLORIDE 80 MG/1
80 TABLET ORAL 2 TIMES DAILY
Qty: 180 TABLET | Refills: 3 | Status: SHIPPED | OUTPATIENT
Start: 2025-01-27

## 2025-01-28 NOTE — TELEPHONE ENCOUNTER
Caller: Bill Umana     Relationship: [unfilled]     Best call back number: [phone]    What is your medical concern? PATIENT CONCERNED WITH WORSENING PVCs. REPORTS IT STARTED AROUND 3 IN THE AFTERNOON YESTERDAY. STATES HE IS HAVING HIS BATTERY REPLACED ON 3.19.24 BUT WOULD LIKE TO KNOW WHAT HE SHOULD DO BEFORE THEN REGARDING HIS CONCERN.        Patient called stating that the neulasta onpro she thinks hasn't injected. She states that she is unsure if she can see the black line on the neulasta. She states that the function is still blinking green and it has always been blinking. She states that it is empty from what she can tell. She doesn't have any wet clothes or wet bandages.   Writer stated that I would give the neulasta another hour before she takes it off and we will call patient tomorrow before noon with an update.   Patient agreeable.

## 2025-02-05 RX ORDER — TORSEMIDE 20 MG/1
20 TABLET ORAL DAILY
Qty: 90 TABLET | Refills: 3 | Status: SHIPPED | OUTPATIENT
Start: 2025-02-05

## 2025-02-07 ENCOUNTER — OFFICE VISIT (OUTPATIENT)
Dept: CARDIOLOGY | Facility: CLINIC | Age: 60
End: 2025-02-07
Payer: MEDICARE

## 2025-02-07 VITALS
WEIGHT: 227 LBS | DIASTOLIC BLOOD PRESSURE: 84 MMHG | HEIGHT: 75 IN | SYSTOLIC BLOOD PRESSURE: 127 MMHG | HEART RATE: 59 BPM | BODY MASS INDEX: 28.23 KG/M2 | OXYGEN SATURATION: 97 %

## 2025-02-07 DIAGNOSIS — I48.0 PAROXYSMAL ATRIAL FIBRILLATION: ICD-10-CM

## 2025-02-07 DIAGNOSIS — I50.22 CHRONIC SYSTOLIC HEART FAILURE: ICD-10-CM

## 2025-02-07 DIAGNOSIS — I10 ESSENTIAL HYPERTENSION: ICD-10-CM

## 2025-02-07 DIAGNOSIS — Z95.810 AICD (AUTOMATIC CARDIOVERTER/DEFIBRILLATOR) PRESENT: ICD-10-CM

## 2025-02-07 DIAGNOSIS — D68.9 COAGULOPATHY: ICD-10-CM

## 2025-02-07 DIAGNOSIS — I42.0 NONISCHEMIC DILATED CARDIOMYOPATHY: Primary | ICD-10-CM

## 2025-02-07 RX ORDER — ONDANSETRON 4 MG/1
TABLET, FILM COATED ORAL
COMMUNITY
Start: 2024-11-13

## 2025-02-07 RX ORDER — CLOPIDOGREL BISULFATE 75 MG/1
75 TABLET ORAL DAILY
COMMUNITY
Start: 2024-12-03

## 2025-02-07 RX ORDER — METHOCARBAMOL 500 MG/1
TABLET, FILM COATED ORAL
COMMUNITY

## 2025-02-07 RX ORDER — POTASSIUM CHLORIDE 750 MG/1
TABLET, EXTENDED RELEASE ORAL EVERY 24 HOURS
COMMUNITY

## 2025-02-07 RX ORDER — ALBUTEROL SULFATE 90 UG/1
INHALANT RESPIRATORY (INHALATION)
COMMUNITY
Start: 2024-12-05

## 2025-02-07 RX ORDER — ARIPIPRAZOLE 2 MG/1
TABLET ORAL EVERY 24 HOURS
COMMUNITY

## 2025-02-07 RX ORDER — BUDESONIDE AND FORMOTEROL FUMARATE DIHYDRATE 160; 4.5 UG/1; UG/1
AEROSOL RESPIRATORY (INHALATION)
COMMUNITY
Start: 2024-12-05

## 2025-02-07 NOTE — PROGRESS NOTES
Cardiac Electrophysiology Outpatient Follow Up Note            [facility]    Follow Up Office Visit      Bill Umana  [id number]  02/07/2025    Primary Care Physician: Kim Goff APRN        Subjective     Chief Complaint:   Diagnoses and all orders for this visit:    1. Nonischemic dilated cardiomyopathy (Primary)    2. AICD (automatic cardioverter/defibrillator) present    3. Chronic systolic heart failure    4. Essential hypertension    5. Paroxysmal atrial fibrillation      Chief Complaint   Patient presents with    Paroxysmal atrial fibrillation    Palpitations       History of Present Illness:   Bill Umana is a 59 y.o. male who presents to my electrophysiology clinic for follow up of [facility] [place] interrogation, afib, NICM>.  He is making a lot of progress since his stroke.  He is doing physical therapy he is only walking with a cane now.  He has had very little A-fib.  He denies any ongoing heart failure symptoms.  He appears euvolemic today.  He has no worsening orthopnea PediaProfen or ICD shocks.  .      Past Medical History:   Past Medical History:   Diagnosis Date    Abnormal ECG     Anxiety and depression     Arthritis     Asthma     Atrial fibrillation 2015    CHF (congestive heart failure) 2005    Chronic bronchitis     Clotting disorder     Coronary artery disease 2002    Deep vein thrombosis 12/2024    Stroke    Diverticulitis     Hyperlipidemia     Hypertension 5 years ago    Migraines     Pulmonary embolism     Sleep apnea 10/22    Stroke 09/2024       Past Surgical History:   Past Surgical History:   Procedure Laterality Date    ATRIAL APPENDAGE EXCLUSION LEFT WITH TRANSESOPHAGEAL ECHOCARDIOGRAM N/A 12/16/2021    Procedure: Atrial Appendage Occlusion (Watchman), DNS any meds;  Surgeon: Lobo Downs DO;  Location: [facility] INVASIVE LOCATION;  Service: Cardiology;  Laterality: N/A;    BACK SURGERY      CARDIAC CATHETERIZATION       CARDIAC DEFIBRILLATOR PLACEMENT  2002,    CARDIAC ELECTROPHYSIOLOGY PROCEDURE N/A 5/31/2024    Procedure: PVC RFA, Rythmia, STX, Vektor, GA, hold Sotalol 3 days;  Surgeon: Lobo Wu DO;  Location:  [facility] INVASIVE LOCATION;  Service: Cardiology;  Laterality: N/A;    COLON SURGERY  02/07/2020    Part of colon removed for diverticulitis     COLONOSCOPY      HERNIA REPAIR      ICD GENERATOR REPLACEMENT N/A 03/19/2024    Procedure: DDD ICD Gen Chg [place] no meds to hold;  Surgeon: Lobo Wu DO;  Location:  [facility] INVASIVE LOCATION;  Service: Cardiovascular;  Laterality: N/A;    INSERT / REPLACE / REMOVE PACEMAKER      PM/ICD    KNEE SURGERY      OTHER SURGICAL HISTORY  2021    Watchman device    OTHER SURGICAL HISTORY      PVA 05/31/2024 PER DR. WU    SPINAL CORD STIMULATOR IMPLANT      TONSILLECTOMY      ULNAR NERVE REPAIR         Family History:   Family History   Problem Relation Age of Onset    Heart attack Mother     Heart failure Mother     Hypertension Mother     Colon cancer Mother     Heart disease Father     Heart failure Father     Pancreatic cancer Sister     Lung cancer Sister     Anemia Maternal Grandmother        Social History:   Social History     Socioeconomic History    Marital status:    Tobacco Use    Smoking status: Never     Passive exposure: Past    Smokeless tobacco: Never   Vaping Use    Vaping status: Never Used    Passive vaping exposure: Yes   Substance and Sexual Activity    Alcohol use: Never    Drug use: Never    Sexual activity: Yes     Partners: Female       Medications:     Current Outpatient Medications:     albuterol sulfate  (90 Base) MCG/ACT inhaler, , Disp: , Rfl:     apixaban (ELIQUIS) 5 MG tablet tablet, Take 1 tablet by mouth 2 (Two) Times a Day., Disp: , Rfl:     ARIPiprazole (ABILIFY) 2 MG tablet, Daily., Disp: , Rfl:     atorvastatin (LIPITOR) 40 MG tablet, Take 1 tablet by mouth Daily., Disp: , Rfl:     clopidogrel (PLAVIX) 75 MG tablet,  Take 1 tablet by mouth Daily. as directed, Disp: , Rfl:     empagliflozin (Jardiance) 10 MG tablet tablet, Take 1 tablet by mouth Daily., Disp: 30 tablet, Rfl: 6    FLUoxetine (PROzac) 40 MG capsule, Take 1 capsule by mouth Daily., Disp: , Rfl:     gabapentin (NEURONTIN) 300 MG capsule, 2 (Two) Times a Day., Disp: , Rfl: 2    HYDROcodone-acetaminophen (NORCO) 5-325 MG per tablet, Take  by mouth Daily., Disp: , Rfl:     ibuprofen (ADVIL,MOTRIN) 800 MG tablet, Take 1 tablet by mouth Every 6 (Six) Hours As Needed for Moderate Pain. prn, Disp: , Rfl:     linaclotide (LINZESS) 72 MCG capsule capsule, Take 1 capsule by mouth Every Other Day., Disp: , Rfl:     methocarbamol (ROBAXIN) 500 MG tablet, TAKE 1 TABLET EVERY 8 HOURS for 90, Disp: , Rfl:     metoprolol succinate XL (TOPROL-XL) 25 MG 24 hr tablet, Take 1 tablet by mouth Daily., Disp: 30 tablet, Rfl: 11    ondansetron (ZOFRAN) 4 MG tablet, Every 4 (Four) Hours., Disp: , Rfl:     oxyCODONE-acetaminophen (PERCOCET) 5-325 MG per tablet, Take 1 tablet by mouth Every 4 (Four) Hours As Needed for Pain., Disp: , Rfl:     pantoprazole (PROTONIX) 40 MG EC tablet, TAKE 1 TABLET EVERY DAY, Disp: 90 tablet, Rfl: 1    potassium chloride (KLOR-CON M10) 10 MEQ CR tablet, TAKE 1 TABLET EVERY DAY (Patient taking differently: 2 tablets.), Disp: 90 tablet, Rfl: 3    potassium chloride (KLOR-CON) 10 MEQ CR tablet, Daily., Disp: , Rfl:     ramipril (ALTACE) 2.5 MG capsule, TAKE 1 CAPSULE EVERY DAY, Disp: 90 capsule, Rfl: 3    sotalol (BETAPACE) 80 MG tablet, TAKE 1 TABLET TWICE DAILY, Disp: 180 tablet, Rfl: 3    Symbicort 160-4.5 MCG/ACT inhaler, , Disp: , Rfl:     torsemide (DEMADEX) 20 MG tablet, TAKE 1 TABLET EVERY DAY, Disp: 90 tablet, Rfl: 3    traMADol (ULTRAM) 50 MG tablet, Take 1 tablet by mouth 2 (Two) Times a Day., Disp: , Rfl:     traZODone (DESYREL) 100 MG tablet, Take 1 tablet by mouth Every Night., Disp: , Rfl:     sotalol (BETAPACE) 120 MG tablet, Take 1 tablet by mouth 2  "(Two) Times a Day. (Patient not taking: Reported on 2/7/2025), Disp: 30 tablet, Rfl: 5    Allergies:   No Known Allergies    Objective   Vital Signs:   Vitals:    02/07/25 1053   BP: 127/84   BP Location: Left arm   Patient Position: Sitting   Pulse: 59   SpO2: 97%   Weight: 103 kg (227 lb)   Height: 190.5 cm (75\")       PHYSICAL EXAM  General appearance: Awake, alert, cooperative  Head: Normocephalic, without obvious abnormality, atraumatic  Eyes: Conjunctivae/corneas clear, EOMs intact  Neck: no adenopathy, no carotid bruit, no JVD, and thyroid: not enlarged  Lungs: clear to auscultation bilaterally and no rhonchi or crackles\", ' symmetric  Heart: regular rate and rhythm, S1, S2 normal, no murmur, click, rub or gallop  Abdomen: Soft, non-tender, bowel sounds normal,  no organomegaly  Extremities: extremities normal, atraumatic, no cyanosis or edema  Skin: Skin color, turgor normal, no rashes or lesions  Neurologic: Grossly normal     Lab Results   Component Value Date    GLUCOSE 147 (H) 09/06/2024    CALCIUM 9.0 05/31/2024     09/10/2024    K 3.5 (L) 09/06/2024    CO2 26.0 05/31/2024     09/06/2024    BUN 15 05/31/2024    CREATININE 0.88 05/31/2024    EGFRIFAFRI >60 06/26/2021    EGFRIFNONA 89 12/13/2021    BCR 17.0 05/31/2024    ANIONGAP 9.0 05/31/2024     Lab Results   Component Value Date    WBC 10.28 09/13/2024    HGB 13.5 (L) 09/13/2024    HCT 38.8 (L) 09/13/2024    MCV 90 09/13/2024     09/13/2024     Lab Results   Component Value Date    INR 1.2 (H) 09/06/2024    INR 1.2 (H) 09/05/2024    INR 1.99 (H) 12/16/2021    PROTIME 21.8 (H) 12/16/2021     Lab Results   Component Value Date    TSH 2.610 11/23/2022       Cardiac Testing:   [employer] ICD DDD rate 60 a paced 7% RV paced less than 1% normal thresholds impedance.  Battery voltage 13 years remaining.  Less than 1% mode switch.  I personally viewed and interpreted the patient's EKG/Telemetry/lab data      ECG 12 Lead    Date/Time: " 2/7/2025 11:36 AM  Performed by: Cosme Vernon PA    Authorized by: Cosme Vernon PA  Comparison: compared with previous ECG from 10/7/2024  Similar to previous ECG  Rhythm: sinus rhythm and paced  Rate: normal  Conduction: conduction normal  ST Segments: ST segments normal  QRS axis: normal    Clinical impression: non-specific ECG             Assessment & Plan    Diagnoses and all orders for this visit:    1. Nonischemic dilated cardiomyopathy (Primary)    2. AICD (automatic cardioverter/defibrillator) present    3. Chronic systolic heart failure    4. Essential hypertension    5. Paroxysmal atrial fibrillation         Diagnosis Plan   1. Nonischemic dilated cardiomyopathy /SHF GDMT, Demadex. EF 35% by Echo 9/7/2024.       2. AICD (automatic cardioverter/defibrillator) present  Interrogation. Stable.  A-fib burden low      3. CVD/PE Recovering well.  Consider hematology oncology consult regarding coagulopathy is tolerating Eliquis well.      4. Essential hypertension  Controlled on current medications      5. Paroxysmal atrial fibrillation  Sotalol Rx.  Remote VT RFA 5/31/2024.  Remote Watchman 12/16/2021-continued anticoagulation due to previous PE.  EKG today stable sotalol, Toprol controlling A-fib well.      Follow-up in 6 months.  Follow-up Dr. Mcdonough regarding nonischemic cardiomyopathy, congestive heart failure.  Maximize medical therapy.  Body mass index is 28.37 kg/m².  Electronically signed by ROSEANNE Villar, 02/07/25, 11:37 AM EST.

## 2025-03-26 ENCOUNTER — TELEPHONE (OUTPATIENT)
Dept: CARDIOLOGY | Facility: CLINIC | Age: 60
End: 2025-03-26
Payer: MEDICARE

## 2025-03-26 NOTE — TELEPHONE ENCOUNTER
Name: Bill Umana    Relationship: Self    Best Callback Number: [phone]     Incoming call to the Hub, requesting to  Reschedule their Other: BSC 6 MO FU   appointment on  04/15/25.     Per Hub workflow, further review is needed before the task can be completed.    Result of Call: Please reach out to the patient to reschedule

## 2025-03-26 NOTE — TELEPHONE ENCOUNTER
LVM for patient to call back and r/s device check appt w/ [facility] on 4/15/25. Appt has been cancelled.

## 2025-04-07 DIAGNOSIS — R06.02 SHORTNESS OF BREATH: ICD-10-CM

## 2025-04-07 DIAGNOSIS — I42.9 CARDIOMYOPATHY, UNSPECIFIED TYPE: ICD-10-CM

## 2025-04-07 DIAGNOSIS — R60.9 EDEMA, UNSPECIFIED TYPE: ICD-10-CM

## 2025-04-07 RX ORDER — POTASSIUM CHLORIDE 750 MG/1
10 TABLET, EXTENDED RELEASE ORAL DAILY
Qty: 90 TABLET | Refills: 3 | OUTPATIENT
Start: 2025-04-07

## 2025-04-23 NOTE — PROGRESS NOTES
[facility] Cardiology  Follow Up Visit  Bill Umana  1965    VISIT DATE:  04/28/25    PCP:   Kim Goff, APRN  [address] [facility] [address]          CC:  Paroxysmal atrial fibrillation and Shortness of Breath      Problem List:  NSVT/nonischemic dilated cardiomyopathy  Status post AICD implant ([employer]) in 2018  Cardiac catheterization March 2018 with normal coronaries and an EF estimated at 40-45%   JORGE L, 3/10/2023: Left ventricular systolic function is moderately decreased. Left ventricular ejection fraction appears to be 31 - 35%  Patient on sotalol for VT, followed by EP  DDD ICD generator change 3/19/2024  Frequent PVCs (bigeminy) on ECG April 2024  Ventricular tachycardia ablation May 2024  Paroxysmal atrial fibrillation  Status post LAAO 12/16/2021  JORGE L, 2/9/2022: 24 mm watchman left atrial appendage occlusion device in place. Device is well-seated with no peridevice flow. No device associated thrombus   PVCs, 20% PVC burden on interrogation February 2024, Frequent PVCs (bigeminy) on ECG April 2024  Chronic systolic heart failure  Pulmonary hypertension  JORGE L 3/10/2023: LVEF 31 - 35%. The left ventricular cavity is mildly-moderately dilated, Moderate tricuspid valve regurgitation is present. Estimated right ventricular systolic pressure from tricuspid regurgitation is mildly elevated (35-45 mmHg).  Obstructive sleep apnea  Hypertension  Hyperlipidemia  Prediabetes; hemoglobin A1c 6.1% April 2024  Bilateral coronary artery stenosis  Carotid duplex November 2019: 16-49% bilateral ICA stenosis  History of subdural hematoma at [facility] July 2021  Orthostatic hypotension, with referral to [place] [facility]  Admission for M3 occlusion stroke, status post TNKase, pulmonary embolus [facility] September 2024  Hx LLE DVT and PE  Sept 2024    History of Present Illness:  Bill Umana  Is a 60 y.o. male with pertinent cardiac history detailed above.   Since last visit with me the patient was admitted to  with acute ischemic stroke requiring TNKase.  Also with a pulmonary embolus.  He had left lower extremity DVT at that time he reports no new abnormalities were noted on the JORGE L however his ejection fraction was low.  He had previously undergone watchman implant for his atrial fibrillation due to a previous intracranial hemorrhage.  He needed to be started back on anticoagulation for these diagnoses.    Patient states over the winter he had 2 additional TIAs and was admitted at [facility].  Underwent a JORGE L at that time.  He did have moderate atrial fibrillation burden last fall but most recent device interrogation showed 0% burden.    He has some residual paresthesias with numbness from the stroke.      Patient Active Problem List    Diagnosis Date Noted    Long term current use of antiarrhythmic drug 02/26/2024    PVC (premature ventricular contraction) 12/27/2023     Note Last Updated: 8/30/2024     Catheter ablation of ventricular tachycardia and premature ventricular complexes.  5/31/2024      LISA (obstructive sleep apnea) 05/30/2023    Depressive disorder 04/27/2023    Chronic systolic heart failure 04/27/2023    Presence of Watchman left atrial appendage closure device 03/24/2023    Pulmonary HTN 03/24/2023     Note Last Updated: 3/24/2023     1. JORGE L 3-10-23:     Left ventricular systolic function is moderately decreased. Left ventricular ejection fraction appears to be 31 - 35%.    The left ventricular cavity is mildly-moderately dilated    Moderate tricuspid valve regurgitation is present. Estimated right ventricular systolic pressure from tricuspid regurgitation is mildly elevated (35-45 mmHg).      Chronic pain disorder 09/20/2022    Lumbar post-laminectomy syndrome 09/20/2022    Bilateral carotid artery stenosis 11/29/2021     Note Last Updated: 11/29/2021     16 to 49% bilateral internal carotid artery stenosis by duplex November 2019       History of SDH (subdural hematoma) 11/29/2021     Note Last Updated: 11/29/2021     [facility] 7-23-21      Chronic fatigue 12/04/2019    Orthostatic hypotension 02/13/2019    Nonsustained ventricular tachycardia 03/23/2018     Note Last Updated: 11/29/2021     interrogation demonstrates 4 episodes of nonsustained ventricular tachycardia and one episode of ventricular tachycardia converted with ATP without defibrillation  patient on Sotalol for ventricular tachycardia followed by EP services      AICD (automatic cardioverter/defibrillator) present 03/05/2018     Note Last Updated: 11/29/2021     S/P AICD implant ([employer])       Paroxysmal atrial fibrillation 03/05/2018     Note Last Updated: 3/21/2022     LAAO 12-16-21  JORGE L, 2/9/2022: 24 mm watchman left atrial appendage occlusion device in place.  Device is well-seated with no peridevice flow.  No device associated thrombus.      Hyperlipidemia 03/05/2018    Essential hypertension 03/05/2018    Nonischemic dilated cardiomyopathy 02/19/2018     Note Last Updated: 4/22/2024     S/P AICD implant ([employer])   Cardiac catheterization March 2018 with normal coronaries and an EF estimated at 40-45%  EF by echocardiogram November 2019 at 35 to 40% with grade 1 diastolic dysfunction  Echocardiogram, 7/2/2021: Mild concentric LVH.  Moderate global hypokinesis.  EF 35-40%.  Diastolic dysfunction.  Left atrium moderately dilated.  Mild aortic valve sclerosis without stenosis.  JORGE L, 2/9/2022:  EF 26 to 30%.  LV systolic function severely decreased.  JORGE L, 3-10-23: Left ventricular systolic function is moderately decreased. Left ventricular ejection fraction appears to be 31 - 35%.   ICD generator change, 3/19/2024:Implantation of dual-chamber ICD model D121          No Known Allergies    Social History     Socioeconomic History    Marital status:    Tobacco Use    Smoking status: Never     Passive exposure: Past    Smokeless tobacco: Never   Vaping Use     Vaping status: Never Used    Passive vaping exposure: Yes   Substance and Sexual Activity    Alcohol use: Never    Drug use: Never    Sexual activity: Yes     Partners: Female       Family History   Problem Relation Age of Onset    Heart attack Mother     Heart failure Mother     Hypertension Mother     Colon cancer Mother     Heart disease Father     Heart failure Father     Pancreatic cancer Sister     Lung cancer Sister     Anemia Maternal Grandmother        Current Medications:    Current Outpatient Medications:     albuterol sulfate  (90 Base) MCG/ACT inhaler, Inhale 2 puffs Every 6 (Six) Hours As Needed., Disp: , Rfl:     apixaban (ELIQUIS) 5 MG tablet tablet, Take 1 tablet by mouth 2 (Two) Times a Day., Disp: , Rfl:     ARIPiprazole (ABILIFY) 2 MG tablet, Take 1 tablet by mouth Daily., Disp: , Rfl:     atorvastatin (LIPITOR) 40 MG tablet, Take 1 tablet by mouth Daily., Disp: , Rfl:     clopidogrel (PLAVIX) 75 MG tablet, Take 1 tablet by mouth Daily. as directed, Disp: , Rfl:     empagliflozin (Jardiance) 10 MG tablet tablet, Take 1 tablet by mouth Daily., Disp: 30 tablet, Rfl: 6    FLUoxetine (PROzac) 40 MG capsule, Take 1 capsule by mouth Daily., Disp: , Rfl:     gabapentin (NEURONTIN) 300 MG capsule, Take 1 capsule by mouth 2 (Two) Times a Day., Disp: , Rfl: 2    HYDROcodone-acetaminophen (NORCO) 5-325 MG per tablet, Take 2 tablets by mouth Every 8 (Eight) Hours As Needed., Disp: , Rfl:     ibuprofen (ADVIL,MOTRIN) 800 MG tablet, Take 1 tablet by mouth Every 6 (Six) Hours As Needed for Moderate Pain. prn, Disp: , Rfl:     linaclotide (LINZESS) 72 MCG capsule capsule, Take 1 capsule by mouth Every Other Day., Disp: , Rfl:     methocarbamol (ROBAXIN) 500 MG tablet, TAKE 1 TABLET EVERY 8 HOURS for 90, Disp: , Rfl:     metoprolol succinate XL (TOPROL-XL) 25 MG 24 hr tablet, Take 1 tablet by mouth Daily., Disp: 30 tablet, Rfl: 11    ondansetron (ZOFRAN) 4 MG tablet, Take 1 tablet by mouth Every 4 (Four)  "Hours., Disp: , Rfl:     oxyCODONE-acetaminophen (PERCOCET) 5-325 MG per tablet, Take 1 tablet by mouth Every 4 (Four) Hours As Needed for Pain., Disp: , Rfl:     pantoprazole (PROTONIX) 40 MG EC tablet, TAKE 1 TABLET EVERY DAY, Disp: 90 tablet, Rfl: 1    potassium chloride (KLOR-CON M10) 10 MEQ CR tablet, TAKE 1 TABLET EVERY DAY (Patient taking differently: 2 tablets.), Disp: 90 tablet, Rfl: 3    potassium chloride (KLOR-CON) 10 MEQ CR tablet, Daily., Disp: , Rfl:     ramipril (ALTACE) 2.5 MG capsule, TAKE 1 CAPSULE EVERY DAY, Disp: 90 capsule, Rfl: 3    sotalol (BETAPACE) 120 MG tablet, Take 1 tablet by mouth 2 (Two) Times a Day., Disp: 30 tablet, Rfl: 5    sotalol (BETAPACE) 80 MG tablet, TAKE 1 TABLET TWICE DAILY, Disp: 180 tablet, Rfl: 3    Symbicort 160-4.5 MCG/ACT inhaler, , Disp: , Rfl:     torsemide (DEMADEX) 20 MG tablet, TAKE 1 TABLET EVERY DAY, Disp: 90 tablet, Rfl: 3    traMADol (ULTRAM) 50 MG tablet, Take 1 tablet by mouth 2 (Two) Times a Day., Disp: , Rfl:     traZODone (DESYREL) 100 MG tablet, Take 1 tablet by mouth Every Night., Disp: , Rfl:      Review of Systems   Cardiovascular:  Positive for irregular heartbeat and palpitations. Negative for chest pain.   Neurological:  Positive for paresthesias and sensory change.       Vitals:    04/28/25 0856   BP: 112/64   BP Location: Right arm   Patient Position: Sitting   Pulse: 65   SpO2: 96%   Weight: 103 kg (226 lb 6.4 oz)   Height: 190.5 cm (75\")       Physical Exam  Constitutional:       Appearance: Normal appearance.   Cardiovascular:      Rate and Rhythm: Normal rate.      Heart sounds: Murmur (Soft systolic) heard.      Comments: Occasional extrasystole  Musculoskeletal:      Right lower leg: No edema.      Left lower leg: No edema.   Neurological:      Mental Status: He is alert.         Diagnostic Data:  Procedures  Lab Results   Component Value Date    CHLPL 146 06/25/2021    TRIG 54 06/25/2021    HDL 47 06/25/2021     Lab Results   Component " Value Date    GLUCOSE 147 (H) 09/06/2024    BUN 15 05/31/2024    CREATININE 0.88 05/31/2024     09/10/2024    K 3.5 (L) 09/06/2024     09/06/2024    CO2 26.0 05/31/2024     Lab Results   Component Value Date    HGBA1C 6.1 (H) 09/06/2024     Lab Results   Component Value Date    WBC 10.28 09/13/2024    HGB 13.5 (L) 09/13/2024    HCT 38.8 (L) 09/13/2024     09/13/2024     Scientific device interrogation  Mode dual-chamber pacemaker/ICD model number P121  Mode DDD with a lower rate of 60  Right atrial lead: P wave amplitude 2.5 mV, threshold 1 V at 0.4 ms, impedance 780 ohms  RV lead less than 1% paced, R wave amplitude 20 mV, threshold 0.7 V at 0.4 ms, impedance 730 ohms, high-voltage impedance 66 nonreligious 13 years  Underlying rhythm sinus with PVCs  There were frequent PVCs since February 2025, heart logic is 7    4% PVC burden    Assessment:  No diagnosis found.    Plan:      Non-ischemic cardiomyopathy  Last EF 20 to 35% September 2024.  Mild RV dilation  Medications include ramipril, Toprol, Jardiance  Has been limited in titration before due to symptomatic hypotension  Atrial fibrillation  Status post Watchman last JORGE L here showed no abnormalities.  Will request JORGE L [facility] in 2024  Eliquis anticoagulation resumed due to PE and recurrent ischemic stroke  Also on plavix  Will refer to hematology for hypercoagulable workup given venous and arterial clots during same admission September 2024  History of ventricular tachycardia  On sotalol per EP service,0% A-fib  ICD in place  Previous VT ablation, having recurrent symptomatic PVCs.  325,000 PVCs on today's device interrogation  Increase Toprol-XL to 25 mg twice daily.  Continue sotalol.  Last EP EKG within normal limits Qtc  Encourage CPAP use  No device therapies            Devaughn Sousa MD [facility]

## 2025-04-28 ENCOUNTER — TELEPHONE (OUTPATIENT)
Dept: CARDIOLOGY | Facility: CLINIC | Age: 60
End: 2025-04-28

## 2025-04-28 ENCOUNTER — OFFICE VISIT (OUTPATIENT)
Dept: CARDIOLOGY | Facility: CLINIC | Age: 60
End: 2025-04-28
Payer: MEDICARE

## 2025-04-28 VITALS
OXYGEN SATURATION: 96 % | BODY MASS INDEX: 28.15 KG/M2 | DIASTOLIC BLOOD PRESSURE: 64 MMHG | WEIGHT: 226.4 LBS | SYSTOLIC BLOOD PRESSURE: 112 MMHG | HEIGHT: 75 IN | HEART RATE: 65 BPM

## 2025-04-28 DIAGNOSIS — D68.9 COAGULOPATHY: Primary | ICD-10-CM

## 2025-04-28 PROCEDURE — 3074F SYST BP LT 130 MM HG: CPT | Performed by: INTERNAL MEDICINE

## 2025-04-28 PROCEDURE — 93283 PRGRMG EVAL IMPLANTABLE DFB: CPT | Performed by: INTERNAL MEDICINE

## 2025-04-28 PROCEDURE — 99214 OFFICE O/P EST MOD 30 MIN: CPT | Performed by: INTERNAL MEDICINE

## 2025-04-28 PROCEDURE — 3078F DIAST BP <80 MM HG: CPT | Performed by: INTERNAL MEDICINE

## 2025-04-28 RX ORDER — METOPROLOL SUCCINATE 25 MG/1
25 TABLET, EXTENDED RELEASE ORAL 2 TIMES DAILY
Qty: 60 TABLET | Refills: 11 | Status: SHIPPED | OUTPATIENT
Start: 2025-04-28

## 2025-05-30 ENCOUNTER — TELEPHONE (OUTPATIENT)
Dept: CARDIOLOGY | Facility: CLINIC | Age: 60
End: 2025-05-30
Payer: MEDICARE

## 2025-05-30 NOTE — TELEPHONE ENCOUNTER
Received [employer] Lat[employer] Consult transmission today, 5/30/2025, from [facility]. Noted in the Latitude web site pt's Latitude home monitor is not communicating with the web site. Will send pt a [employer] message about home monitor connectivity.

## 2025-06-25 ENCOUNTER — LAB (OUTPATIENT)
Dept: LAB | Facility: HOSPITAL | Age: 60
End: 2025-06-25
Payer: MEDICARE

## 2025-06-25 ENCOUNTER — CONSULT (OUTPATIENT)
Dept: ONCOLOGY | Facility: CLINIC | Age: 60
End: 2025-06-25
Payer: MEDICARE

## 2025-06-25 VITALS
TEMPERATURE: 97.3 F | OXYGEN SATURATION: 98 % | RESPIRATION RATE: 16 BRPM | BODY MASS INDEX: 30.22 KG/M2 | DIASTOLIC BLOOD PRESSURE: 78 MMHG | HEART RATE: 60 BPM | WEIGHT: 228 LBS | SYSTOLIC BLOOD PRESSURE: 115 MMHG | HEIGHT: 73 IN

## 2025-06-25 DIAGNOSIS — I82.4Z2 ACUTE DEEP VEIN THROMBOSIS (DVT) OF DISTAL VEIN OF LEFT LOWER EXTREMITY: ICD-10-CM

## 2025-06-25 DIAGNOSIS — I26.94 MULTIPLE SUBSEGMENTAL PULMONARY EMBOLI WITHOUT ACUTE COR PULMONALE: ICD-10-CM

## 2025-06-25 DIAGNOSIS — I82.4Z2 ACUTE DEEP VEIN THROMBOSIS (DVT) OF DISTAL VEIN OF LEFT LOWER EXTREMITY: Primary | ICD-10-CM

## 2025-06-25 PROCEDURE — 85210 CLOT FACTOR II PROTHROM SPEC: CPT

## 2025-06-25 PROCEDURE — 85300 ANTITHROMBIN III ACTIVITY: CPT

## 2025-06-25 PROCEDURE — 99204 OFFICE O/P NEW MOD 45 MIN: CPT | Performed by: INTERNAL MEDICINE

## 2025-06-25 PROCEDURE — 86038 ANTINUCLEAR ANTIBODIES: CPT

## 2025-06-25 PROCEDURE — 85306 CLOT INHIBIT PROT S FREE: CPT

## 2025-06-25 PROCEDURE — 86147 CARDIOLIPIN ANTIBODY EA IG: CPT

## 2025-06-25 PROCEDURE — 3078F DIAST BP <80 MM HG: CPT | Performed by: INTERNAL MEDICINE

## 2025-06-25 PROCEDURE — 85305 CLOT INHIBIT PROT S TOTAL: CPT

## 2025-06-25 PROCEDURE — 1126F AMNT PAIN NOTED NONE PRSNT: CPT | Performed by: INTERNAL MEDICINE

## 2025-06-25 PROCEDURE — 86146 BETA-2 GLYCOPROTEIN ANTIBODY: CPT

## 2025-06-25 PROCEDURE — 85302 CLOT INHIBIT PROT C ANTIGEN: CPT

## 2025-06-25 PROCEDURE — 3074F SYST BP LT 130 MM HG: CPT | Performed by: INTERNAL MEDICINE

## 2025-06-25 PROCEDURE — 36415 COLL VENOUS BLD VENIPUNCTURE: CPT

## 2025-06-25 RX ORDER — ONDANSETRON 4 MG/1
TABLET, ORALLY DISINTEGRATING ORAL
COMMUNITY
Start: 2025-05-13

## 2025-06-25 RX ORDER — BUTALBITAL, ACETAMINOPHEN AND CAFFEINE 300; 40; 50 MG/1; MG/1; MG/1
CAPSULE ORAL
COMMUNITY
Start: 2025-05-29

## 2025-06-25 RX ORDER — UBROGEPANT 100 MG/1
TABLET ORAL
COMMUNITY
Start: 2025-06-17

## 2025-06-25 RX ORDER — DIVALPROEX SODIUM 250 MG/1
250 TABLET, DELAYED RELEASE ORAL NIGHTLY
COMMUNITY
Start: 2025-05-31

## 2025-06-25 RX ORDER — NITROGLYCERIN 0.4 MG/1
0.4 TABLET SUBLINGUAL
COMMUNITY
Start: 2025-05-13

## 2025-06-25 RX ORDER — FLUTICASONE PROPIONATE AND SALMETEROL 500; 50 UG/1; UG/1
1 POWDER RESPIRATORY (INHALATION) 2 TIMES DAILY
COMMUNITY
Start: 2025-03-04

## 2025-06-25 RX ORDER — AMOXICILLIN 500 MG/1
1 CAPSULE ORAL 3 TIMES DAILY
COMMUNITY
Start: 2025-06-17

## 2025-06-25 NOTE — LETTER
June 25, 2025     RAFY Aguirre  [address]  [place] [id number]    Patient: Bill Umana   YOB: 1965   Date of Visit: 6/25/2025     Dear RAFY Aguirre:       Thank you for referring Bill Umana to me for evaluation. Below are the relevant portions of my assessment and plan of care.    If you have questions, please do not hesitate to call me. I look forward to following Bill along with you.         Sincerely,        Edith Lou MD        CC: MD Dasha Lugo Arvinda, MD  06/25/25 1247  Sign when Signing Visit  ID: 60 y.o. year old male from [place] [id number]    PCP: Kim Goff APRN    REFERRING PHYSICIAN: Devaughn Sousa MD    Reason for Consultation: Concern for a hypercoagulable state    Dear Dr. Sousa and Ms. Goff    It is a pleasure to meet Mr. Umana today.  He is a very pleasant 60-year-old gentleman who presents today for consultation for concern of a underlying hypercoagulable state.  He has fairly significant and heart issues with an EF of between 20 and 30%.  He has underlying A-fib.  In September 2024 was admitted to the hospital with a stroke and was at .  Few days later he was noted to have a DVT and a PE also.  I was asked to see him because he has had multiple clots to make sure that he does not have an underlying hypercoagulable state.  He is currently on anticoagulation on Eliquis.      Past Medical History:   Diagnosis Date   • Abnormal ECG    • Anxiety and depression    • Arthritis    • Asthma    • Atrial fibrillation 2015   • CHF (congestive heart failure) 2005   • Chronic bronchitis    • Clotting disorder    • Coronary artery disease 2002   • Deep vein thrombosis 12/2024    Stroke   • Diverticulitis    • Hyperlipidemia    • Hypertension 5 years ago   • Migraines    • Pulmonary embolism    • Sleep apnea 10/22   • Stroke 09/2024       Past Surgical History:   Procedure Laterality Date    • ATRIAL APPENDAGE EXCLUSION LEFT WITH TRANSESOPHAGEAL ECHOCARDIOGRAM N/A 12/16/2021    Procedure: Atrial Appendage Occlusion (Watchman), DNS any meds;  Surgeon: Lobo Wu DO;  Location:  [facility];  Service: Cardiology;  Laterality: N/A;   • BACK SURGERY     • CARDIAC CATHETERIZATION     • CARDIAC DEFIBRILLATOR PLACEMENT  2002,   • CARDIAC ELECTROPHYSIOLOGY PROCEDURE N/A 5/31/2024    Procedure: PVC RFA, Rythmia, STX, Vektor, GA, hold Sotalol 3 days;  Surgeon: Lobo Wu DO;  Location:  [facility];  Service: Cardiology;  Laterality: N/A;   • COLON SURGERY  02/07/2020    Part of colon removed for diverticulitis    • COLONOSCOPY     • HERNIA REPAIR     • ICD GENERATOR REPLACEMENT N/A 03/19/2024    Procedure: DDD ICD Gen Chg (BS), no meds to hold;  Surgeon: Lobo Wu DO;  Location:  [facility];  Service: Cardiovascular;  Laterality: N/A;   • INSERT / REPLACE / REMOVE PACEMAKER      PM/ICD   • KNEE SURGERY     • OTHER SURGICAL HISTORY  2021    Watchman device   • OTHER SURGICAL HISTORY      PVA 05/31/2024 PER DR. WU   • SPINAL CORD STIMULATOR IMPLANT     • TONSILLECTOMY     • ULNAR NERVE REPAIR         Social History     Socioeconomic History   • Marital status:    Tobacco Use   • Smoking status: Never     Passive exposure: Past   • Smokeless tobacco: Never   Vaping Use   • Vaping status: Never Used   • Passive vaping exposure: Yes   Substance and Sexual Activity   • Alcohol use: Never   • Drug use: Never   • Sexual activity: Yes     Partners: Female       Family History   Problem Relation Age of Onset   • Heart attack Mother    • Heart failure Mother    • Hypertension Mother    • Colon cancer Mother    • Heart disease Father    • Heart failure Father    • Pancreatic cancer Sister    • Lung cancer Sister    • Anemia Maternal Grandmother        Review of Systems:    16 point review of systems was performed and reviewed and scanned into the  EMR    Review of Systems - Oncology      Current Outpatient Medications:   •  albuterol sulfate  (90 Base) MCG/ACT inhaler, Inhale 2 puffs Every 6 (Six) Hours As Needed., Disp: , Rfl:   •  amoxicillin (AMOXIL) 500 MG capsule, Take 1 capsule by mouth 3 times a day., Disp: , Rfl:   •  apixaban (ELIQUIS) 5 MG tablet tablet, Take 1 tablet by mouth 2 (Two) Times a Day., Disp: , Rfl:   •  ARIPiprazole (ABILIFY) 2 MG tablet, Take 1 tablet by mouth Daily., Disp: , Rfl:   •  atorvastatin (LIPITOR) 40 MG tablet, Take 1 tablet by mouth Daily., Disp: , Rfl:   •  butalbital-acetaminophen-caffeine (ORBIVAN) -40 MG capsule capsule, TAKE 1 TABLET BY MOUTH AS NEEDED FOR HEADACHE. NO MORE THAN TWICE WEEKLY., Disp: , Rfl:   •  clopidogrel (PLAVIX) 75 MG tablet, Take 1 tablet by mouth Daily. as directed, Disp: , Rfl:   •  divalproex (DEPAKOTE) 250 MG DR tablet, Take 1 tablet by mouth Every Night., Disp: , Rfl:   •  empagliflozin (Jardiance) 10 MG tablet tablet, Take 1 tablet by mouth Daily., Disp: 30 tablet, Rfl: 6  •  FLUoxetine (PROzac) 40 MG capsule, Take 1 capsule by mouth Daily., Disp: , Rfl:   •  Fluticasone-Salmeterol (ADVAIR/WIXELA) 500-50 MCG/ACT DISKUS, Inhale 1 puff 2 (Two) Times a Day., Disp: , Rfl:   •  gabapentin (NEURONTIN) 300 MG capsule, Take 1 capsule by mouth 2 (Two) Times a Day., Disp: , Rfl: 2  •  HYDROcodone-acetaminophen (NORCO) 5-325 MG per tablet, Take 2 tablets by mouth Every 8 (Eight) Hours As Needed., Disp: , Rfl:   •  ibuprofen (ADVIL,MOTRIN) 800 MG tablet, Take 1 tablet by mouth Every 6 (Six) Hours As Needed for Moderate Pain. prn, Disp: , Rfl:   •  linaclotide (LINZESS) 72 MCG capsule capsule, Take 1 capsule by mouth Every Other Day., Disp: , Rfl:   •  methocarbamol (ROBAXIN) 500 MG tablet, TAKE 1 TABLET EVERY 8 HOURS for 90, Disp: , Rfl:   •  metoprolol succinate XL (TOPROL-XL) 25 MG 24 hr tablet, Take 1 tablet by mouth 2 (Two) Times a Day., Disp: 60 tablet, Rfl: 11  •  nitroglycerin  (NITROSTAT) 0.4 MG SL tablet, Place 1 tablet under the tongue Every 5 (Five) Minutes As Needed for Chest Pain. do not exceed a total of 3 doses in 15 minutes, Disp: , Rfl:   •  ondansetron (ZOFRAN) 4 MG tablet, Take 1 tablet by mouth Every 4 (Four) Hours., Disp: , Rfl:   •  ondansetron ODT (ZOFRAN-ODT) 4 MG disintegrating tablet, DISSOLVE 1 TABLET IN MOUTH EVERY 6 HOURS AS NEEDED FOR NAUSEA, Disp: , Rfl:   •  oxyCODONE-acetaminophen (PERCOCET) 5-325 MG per tablet, Take 1 tablet by mouth Every 4 (Four) Hours As Needed for Pain., Disp: , Rfl:   •  pantoprazole (PROTONIX) 40 MG EC tablet, TAKE 1 TABLET EVERY DAY, Disp: 90 tablet, Rfl: 1  •  potassium chloride (KLOR-CON M10) 10 MEQ CR tablet, TAKE 1 TABLET EVERY DAY (Patient taking differently: 2 tablets.), Disp: 90 tablet, Rfl: 3  •  potassium chloride (KLOR-CON) 10 MEQ CR tablet, Daily., Disp: , Rfl:   •  ramipril (ALTACE) 2.5 MG capsule, TAKE 1 CAPSULE EVERY DAY, Disp: 90 capsule, Rfl: 3  •  sotalol (BETAPACE) 80 MG tablet, TAKE 1 TABLET TWICE DAILY, Disp: 180 tablet, Rfl: 3  •  Symbicort 160-4.5 MCG/ACT inhaler, , Disp: , Rfl:   •  torsemide (DEMADEX) 20 MG tablet, TAKE 1 TABLET EVERY DAY, Disp: 90 tablet, Rfl: 3  •  traMADol (ULTRAM) 50 MG tablet, Take 1 tablet by mouth 2 (Two) Times a Day., Disp: , Rfl:   •  traZODone (DESYREL) 100 MG tablet, Take 1 tablet by mouth Every Night., Disp: , Rfl:   •  Ubrelvy 100 MG tablet, TAKE ONE TABLET BY MOUTH AT ONSET OF MIGRAINE. IF SYMPTOMS PERSIST, A SECOND DOSE MAY BE TAKEN IN 2 HOURS. DO NOT EXCEED 2 DOSES IN A 24 HOUR PERIOD, UNLESS OTHERWISE INSTRUCTED BY YOUR PHYSICIAN, Disp: , Rfl:     Pain Medications              ARIPiprazole (ABILIFY) 2 MG tablet Take 1 tablet by mouth Daily.    butalbital-acetaminophen-caffeine (ORBIVAN) -40 MG capsule capsule TAKE 1 TABLET BY MOUTH AS NEEDED FOR HEADACHE. NO MORE THAN TWICE WEEKLY.    divalproex (DEPAKOTE) 250 MG DR tablet Take 1 tablet by mouth Every Night.    FLUoxetine  (PROzac) 40 MG capsule Take 1 capsule by mouth Daily.    gabapentin (NEURONTIN) 300 MG capsule Take 1 capsule by mouth 2 (Two) Times a Day.    HYDROcodone-acetaminophen (NORCO) 5-325 MG per tablet Take 2 tablets by mouth Every 8 (Eight) Hours As Needed.    ibuprofen (ADVIL,MOTRIN) 800 MG tablet Take 1 tablet by mouth Every 6 (Six) Hours As Needed for Moderate Pain. prn    methocarbamol (ROBAXIN) 500 MG tablet TAKE 1 TABLET EVERY 8 HOURS for 90    oxyCODONE-acetaminophen (PERCOCET) 5-325 MG per tablet Take 1 tablet by mouth Every 4 (Four) Hours As Needed for Pain.    traMADol (ULTRAM) 50 MG tablet Take 1 tablet by mouth 2 (Two) Times a Day.    traZODone (DESYREL) 100 MG tablet Take 1 tablet by mouth Every Night.             No Known Allergies      ECOG score: 1           Objective    Vitals:    06/25/25 1108   BP: 115/78   Pulse: 60   Resp: 16   Temp: 97.3 °F (36.3 °C)   SpO2: 98%     Body mass index is 30.29 kg/m².  Body surface area is 2.27 meters squared.        06/25/25  1108   Weight: 103 kg (228 lb)     Pain Score    06/25/25 1108   PainSc: 0-No pain          Physical Exam    General: well appearing, in no acute distress  HEENT: sclera anicteric, oropharynx clear, neck is supple  Lymphatics: no cervical, supraclavicular, or axillary adenopathy  Cardiovascular: regular rate and rhythm, no murmurs, rubs or gallops  Lungs: clear to auscultation bilaterally  Abdomen: soft, nontender, nondistended.  No palpable organomegaly  Extremities: no lower extremity edema  Skin: no rashes, lesions, bruising, or petechiae  Msk:  Shows no weakness of the large muscle groups  Psych: Mood is stable        Lab Results   Component Value Date    GLUCOSE 147 (H) 09/06/2024    BUN 15 05/31/2024    CREATININE 0.88 05/31/2024     09/10/2024    K 3.5 (L) 09/06/2024     09/06/2024    CO2 26.0 05/31/2024    CALCIUM 9.0 05/31/2024    PROTEINTOT 6.4 06/26/2021    ALBUMIN 3.6 06/26/2021    BILITOT 0.4 06/26/2021    ALKPHOS 75  06/26/2021    AST 24 06/26/2021    ALT 32 06/26/2021       Lab Results   Component Value Date    HGB 13.5 (L) 09/13/2024    HCT 38.8 (L) 09/13/2024    MCV 90 09/13/2024     09/13/2024    WBC 10.28 09/13/2024    NEUTROABS 11.26 (H) 09/06/2024    LYMPHSABS 2.50 09/06/2024    MONOSABS 1.28 (H) 09/06/2024    EOSABS 0.02 09/06/2024    BASOSABS 0.03 09/06/2024       No Images in the past 120 days found..      Assessment     1.  Stroke and left lower extremity DVT and PE.  Think is reasonable to consider a hypercoagulable workup.  Though it would not change what we do going forward.  He will be on indefinite anticoagulation.  I suspect the stroke was a result of low EF and underlying A-fib.  In the subsequent hospitalization probably increases risk for a DVT that resulted in a PE.  My suspicion for an underlying hypercoagulable state is very low at this time.  Does have a family history of pancreatic cancer.  Though patient's not symptomatic from this and he had ultrasound of his abdomen and liver along with CT of the chest that did not show anything obvious.        Thank you for allowing me to participate in the care of this patient.    Yours sincerely,    Edith Lou MD  [facility]  Hematology and Oncology         Orders Placed This Encounter   Procedures   • FREDRICK by IFA, Reflex to Titer and Pattern     Standing Status:   Future     Number of Occurrences:   1     Expected Date:   6/30/2025     Expiration Date:   6/25/2026     Release to patient:   Routine Release [[id number]]   • Beta-2 Glycoprotein Antibodies     Standing Status:   Future     Number of Occurrences:   1     Expected Date:   6/30/2025     Expiration Date:   6/25/2026     Release to patient:   Routine Release [[id number]]   • Anticardiolipin Antibody, IgG / M, Qn     Standing Status:   Future     Number of Occurrences:   1     Expected Date:   6/30/2025     Expiration Date:   6/25/2026     Release to patient:   Routine Release  [[id number]]   • Antithrombin III     Standing Status:   Future     Number of Occurrences:   1     Expected Date:   6/30/2025     Expiration Date:   6/25/2026     Release to patient:   Routine Release [[id number]]   • Factor 2 Activity     Standing Status:   Future     Number of Occurrences:   1     Expected Date:   6/30/2025     Expiration Date:   6/25/2026     Release to patient:   Routine Release [[id number]]   • Protein C Antigen, Total     Standing Status:   Future     Number of Occurrences:   1     Expected Date:   6/30/2025     Expiration Date:   6/25/2026     Release to patient:   Routine Release [[id number]]   • Protein S Functional     Standing Status:   Future     Number of Occurrences:   1     Expected Date:   6/30/2025     Expiration Date:   6/25/2026     Release to patient:   Routine Release [[id number]]   • Protein C & S Antigens     Standing Status:   Future     Number of Occurrences:   1     Expected Date:   6/30/2025     Expiration Date:   6/25/2026     Release to patient:   Routine Release [[id number]]

## 2025-06-25 NOTE — PROGRESS NOTES
ID: 60 y.o. year old male from [facility] [id number]    PCP: Kim Goff APRN    REFERRING PHYSICIAN: Devaughn Sousa MD    Reason for Consultation: Concern for a hypercoagulable state    Dear Dr. Sousa and Ms. Goff    It is a pleasure to meet Mr. Umana today.  He is a very pleasant 60-year-old gentleman who presents today for consultation for concern of a underlying hypercoagulable state.  He has fairly significant and heart issues with an EF of between 20 and 30%.  He has underlying A-fib.  In September 2024 was admitted to the hospital with a stroke and was at .  Few days later he was noted to have a DVT and a PE also.  I was asked to see him because he has had multiple clots to make sure that he does not have an underlying hypercoagulable state.  He is currently on anticoagulation on Eliquis.      Past Medical History:   Diagnosis Date    Abnormal ECG     Anxiety and depression     Arthritis     Asthma     Atrial fibrillation 2015    CHF (congestive heart failure) 2005    Chronic bronchitis     Clotting disorder     Coronary artery disease 2002    Deep vein thrombosis 12/2024    Stroke    Diverticulitis     Hyperlipidemia     Hypertension 5 years ago    Migraines     Pulmonary embolism     Sleep apnea 10/22    Stroke 09/2024       Past Surgical History:   Procedure Laterality Date    ATRIAL APPENDAGE EXCLUSION LEFT WITH TRANSESOPHAGEAL ECHOCARDIOGRAM N/A 12/16/2021    Procedure: Atrial Appendage Occlusion (Watchman), DNS any meds;  Surgeon: Lobo Downs DO;  Location:  [facility] INVASIVE LOCATION;  Service: Cardiology;  Laterality: N/A;    BACK SURGERY      CARDIAC CATHETERIZATION      CARDIAC DEFIBRILLATOR PLACEMENT  2002,    CARDIAC ELECTROPHYSIOLOGY PROCEDURE N/A 5/31/2024    Procedure: PVC RFA, Rythmia, STX, Vektor, GA, hold Sotalol 3 days;  Surgeon: Lobo Downs DO;  Location:  [facility];  Service: Cardiology;  Laterality: N/A;    COLON SURGERY  02/07/2020     Part of colon removed for diverticulitis     COLONOSCOPY      HERNIA REPAIR      ICD GENERATOR REPLACEMENT N/A 03/19/2024    Procedure: DDD ICD Gen Chg (BSC), no meds to hold;  Surgeon: Lobo Wu DO;  Location: [facility] INVAS[facility] LOC[facility];  Service: Cardiovascular;  Laterality: N/A;    INSERT / REPLACE / REMOVE PACEMAKER      PM/ICD    KNEE SURGERY      OTHER SURGICAL HISTORY  2021    Watchman device    OTHER SURGICAL HISTORY      PVA 05/31/2024 PER DR. WU    SPINAL CORD STIMULATOR IMPLANT      TONSILLECTOMY      ULNAR NERVE REPAIR         Social History     Socioeconomic History    Marital status:    Tobacco Use    Smoking status: Never     Passive exposure: Past    Smokeless tobacco: Never   Vaping Use    Vaping status: Never Used    Passive vaping exposure: Yes   Substance and Sexual Activity    Alcohol use: Never    Drug use: Never    Sexual activity: Yes     Partners: Female       Family History   Problem Relation Age of Onset    Heart attack Mother     Heart failure Mother     Hypertension Mother     Colon cancer Mother     Heart disease Father     Heart failure Father     Pancreatic cancer Sister     Lung cancer Sister     Anemia Maternal Grandmother        Review of Systems:    16 point review of systems was performed and reviewed and scanned into the EMR    Review of Systems - Oncology      Current Outpatient Medications:     albuterol sulfate  (90 Base) MCG/ACT inhaler, Inhale 2 puffs Every 6 (Six) Hours As Needed., Disp: , Rfl:     amoxicillin (AMOXIL) 500 MG capsule, Take 1 capsule by mouth 3 times a day., Disp: , Rfl:     apixaban (ELIQUIS) 5 MG tablet tablet, Take 1 tablet by mouth 2 (Two) Times a Day., Disp: , Rfl:     ARIPiprazole (ABILIFY) 2 MG tablet, Take 1 tablet by mouth Daily., Disp: , Rfl:     atorvastatin (LIPITOR) 40 MG tablet, Take 1 tablet by mouth Daily., Disp: , Rfl:     butalbital-acetaminophen-caffeine (ORBIVAN) -40 MG capsule capsule, TAKE 1 TABLET BY  MOUTH AS NEEDED FOR HEADACHE. NO MORE THAN TWICE WEEKLY., Disp: , Rfl:     clopidogrel (PLAVIX) 75 MG tablet, Take 1 tablet by mouth Daily. as directed, Disp: , Rfl:     divalproex (DEPAKOTE) 250 MG DR tablet, Take 1 tablet by mouth Every Night., Disp: , Rfl:     empagliflozin (Jardiance) 10 MG tablet tablet, Take 1 tablet by mouth Daily., Disp: 30 tablet, Rfl: 6    FLUoxetine (PROzac) 40 MG capsule, Take 1 capsule by mouth Daily., Disp: , Rfl:     Fluticasone-Salmeterol (ADVAIR/WIXELA) 500-50 MCG/ACT DISKUS, Inhale 1 puff 2 (Two) Times a Day., Disp: , Rfl:     gabapentin (NEURONTIN) 300 MG capsule, Take 1 capsule by mouth 2 (Two) Times a Day., Disp: , Rfl: 2    HYDROcodone-acetaminophen (NORCO) 5-325 MG per tablet, Take 2 tablets by mouth Every 8 (Eight) Hours As Needed., Disp: , Rfl:     ibuprofen (ADVIL,MOTRIN) 800 MG tablet, Take 1 tablet by mouth Every 6 (Six) Hours As Needed for Moderate Pain. prn, Disp: , Rfl:     linaclotide (LINZESS) 72 MCG capsule capsule, Take 1 capsule by mouth Every Other Day., Disp: , Rfl:     methocarbamol (ROBAXIN) 500 MG tablet, TAKE 1 TABLET EVERY 8 HOURS for 90, Disp: , Rfl:     metoprolol succinate XL (TOPROL-XL) 25 MG 24 hr tablet, Take 1 tablet by mouth 2 (Two) Times a Day., Disp: 60 tablet, Rfl: 11    nitroglycerin (NITROSTAT) 0.4 MG SL tablet, Place 1 tablet under the tongue Every 5 (Five) Minutes As Needed for Chest Pain. do not exceed a total of 3 doses in 15 minutes, Disp: , Rfl:     ondansetron (ZOFRAN) 4 MG tablet, Take 1 tablet by mouth Every 4 (Four) Hours., Disp: , Rfl:     ondansetron ODT (ZOFRAN-ODT) 4 MG disintegrating tablet, DISSOLVE 1 TABLET IN MOUTH EVERY 6 HOURS AS NEEDED FOR NAUSEA, Disp: , Rfl:     oxyCODONE-acetaminophen (PERCOCET) 5-325 MG per tablet, Take 1 tablet by mouth Every 4 (Four) Hours As Needed for Pain., Disp: , Rfl:     pantoprazole (PROTONIX) 40 MG EC tablet, TAKE 1 TABLET EVERY DAY, Disp: 90 tablet, Rfl: 1    potassium chloride (KLOR-CON M10)  10 MEQ CR tablet, TAKE 1 TABLET EVERY DAY (Patient taking differently: 2 tablets.), Disp: 90 tablet, Rfl: 3    potassium chloride (KLOR-CON) 10 MEQ CR tablet, Daily., Disp: , Rfl:     ramipril (ALTACE) 2.5 MG capsule, TAKE 1 CAPSULE EVERY DAY, Disp: 90 capsule, Rfl: 3    sotalol (BETAPACE) 80 MG tablet, TAKE 1 TABLET TWICE DAILY, Disp: 180 tablet, Rfl: 3    Symbicort 160-4.5 MCG/ACT inhaler, , Disp: , Rfl:     torsemide (DEMADEX) 20 MG tablet, TAKE 1 TABLET EVERY DAY, Disp: 90 tablet, Rfl: 3    traMADol (ULTRAM) 50 MG tablet, Take 1 tablet by mouth 2 (Two) Times a Day., Disp: , Rfl:     traZODone (DESYREL) 100 MG tablet, Take 1 tablet by mouth Every Night., Disp: , Rfl:     Ubrelvy 100 MG tablet, TAKE ONE TABLET BY MOUTH AT ONSET OF MIGRAINE. IF SYMPTOMS PERSIST, A SECOND DOSE MAY BE TAKEN IN 2 HOURS. DO NOT EXCEED 2 DOSES IN A 24 HOUR PERIOD, UNLESS OTHERWISE INSTRUCTED BY YOUR PHYSICIAN, Disp: , Rfl:     Pain Medications              ARIPiprazole (ABILIFY) 2 MG tablet Take 1 tablet by mouth Daily.    butalbital-acetaminophen-caffeine (ORBIVAN) -40 MG capsule capsule TAKE 1 TABLET BY MOUTH AS NEEDED FOR HEADACHE. NO MORE THAN TWICE WEEKLY.    divalproex (DEPAKOTE) 250 MG DR tablet Take 1 tablet by mouth Every Night.    FLUoxetine (PROzac) 40 MG capsule Take 1 capsule by mouth Daily.    gabapentin (NEURONTIN) 300 MG capsule Take 1 capsule by mouth 2 (Two) Times a Day.    HYDROcodone-acetaminophen (NORCO) 5-325 MG per tablet Take 2 tablets by mouth Every 8 (Eight) Hours As Needed.    ibuprofen (ADVIL,MOTRIN) 800 MG tablet Take 1 tablet by mouth Every 6 (Six) Hours As Needed for Moderate Pain. prn    methocarbamol (ROBAXIN) 500 MG tablet TAKE 1 TABLET EVERY 8 HOURS for 90    oxyCODONE-acetaminophen (PERCOCET) 5-325 MG per tablet Take 1 tablet by mouth Every 4 (Four) Hours As Needed for Pain.    traMADol (ULTRAM) 50 MG tablet Take 1 tablet by mouth 2 (Two) Times a Day.    traZODone (DESYREL) 100 MG tablet Take 1  tablet by mouth Every Night.             No Known Allergies      ECOG score: 1           Objective     Vitals:    06/25/25 1108   BP: 115/78   Pulse: 60   Resp: 16   Temp: 97.3 °F (36.3 °C)   SpO2: 98%     Body mass index is 30.29 kg/m².  Body surface area is 2.27 meters squared.        06/25/25  1108   Weight: 103 kg (228 lb)     Pain Score    06/25/25 1108   PainSc: 0-No pain          Physical Exam    General: well appearing, in no acute distress  HEENT: sclera anicteric, oropharynx clear, neck is supple  Lymphatics: no cervical, supraclavicular, or axillary adenopathy  Cardiovascular: regular rate and rhythm, no murmurs, rubs or gallops  Lungs: clear to auscultation bilaterally  Abdomen: soft, nontender, nondistended.  No palpable organomegaly  Extremities: no lower extremity edema  Skin: no rashes, lesions, bruising, or petechiae  Msk:  Shows no weakness of the large muscle groups  Psych: Mood is stable        Lab Results   Component Value Date    GLUCOSE 147 (H) 09/06/2024    BUN 15 05/31/2024    CREATININE 0.88 05/31/2024     09/10/2024    K 3.5 (L) 09/06/2024     09/06/2024    CO2 26.0 05/31/2024    CALCIUM 9.0 05/31/2024    PROTEINTOT 6.4 06/26/2021    ALBUMIN 3.6 06/26/2021    BILITOT 0.4 06/26/2021    ALKPHOS 75 06/26/2021    AST 24 06/26/2021    ALT 32 06/26/2021       Lab Results   Component Value Date    HGB 13.5 (L) 09/13/2024    HCT 38.8 (L) 09/13/2024    MCV 90 09/13/2024     09/13/2024    WBC 10.28 09/13/2024    NEUTROABS 11.26 (H) 09/06/2024    LYMPHSABS 2.50 09/06/2024    MONOSABS 1.28 (H) 09/06/2024    EOSABS 0.02 09/06/2024    BASOSABS 0.03 09/06/2024       No Images in the past 120 days found..      Assessment      1.  Stroke and left lower extremity DVT and PE.  Think is reasonable to consider a hypercoagulable workup.  Though it would not change what we do going forward.  He will be on indefinite anticoagulation.  I suspect the stroke was a result of low EF and underlying  A-fib.  In the subsequent hospitalization probably increases risk for a DVT that resulted in a PE.  My suspicion for an underlying hypercoagulable state is very low at this time.  Does have a family history of pancreatic cancer.  Though patient's not symptomatic from this and he had ultrasound of his abdomen and liver along with CT of the chest that did not show anything obvious.        Thank you for allowing me to participate in the care of this patient.    Yours sincerely,    Edith Lou MD  [facility]  Hematology and Oncology         Orders Placed This Encounter   Procedures    FREDRICK by IFA, Reflex to Titer and Pattern     Standing Status:   Future     Number of Occurrences:   1     Expected Date:   6/30/2025     Expiration Date:   6/25/2026     Release to patient:   Routine Release [[id number]]    Beta-2 Glycoprotein Antibodies     Standing Status:   Future     Number of Occurrences:   1     Expected Date:   6/30/2025     Expiration Date:   6/25/2026     Release to patient:   Routine Release [[id number]]    Anticardiolipin Antibody, IgG / M, Qn     Standing Status:   Future     Number of Occurrences:   1     Expected Date:   6/30/2025     Expiration Date:   6/25/2026     Release to patient:   Routine Release [[id number]]    Antithrombin III     Standing Status:   Future     Number of Occurrences:   1     Expected Date:   6/30/2025     Expiration Date:   6/25/2026     Release to patient:   Routine Release [[id number]]    Factor 2 Activity     Standing Status:   Future     Number of Occurrences:   1     Expected Date:   6/30/2025     Expiration Date:   6/25/2026     Release to patient:   Routine Release [[id number]]    Protein C Antigen, Total     Standing Status:   Future     Number of Occurrences:   1     Expected Date:   6/30/2025     Expiration Date:   6/25/2026     Release to patient:   Routine Release [[id number]]    Protein S Functional     Standing Status:   Future     Number of  Occurrences:   1     Expected Date:   6/30/2025     Expiration Date:   6/25/2026     Release to patient:   Routine Release [[id number]]    Protein C & S Antigens     Standing Status:   Future     Number of Occurrences:   1     Expected Date:   6/30/2025     Expiration Date:   6/25/2026     Release to patient:   Routine Release [[id number]]

## 2025-06-26 LAB
ANA SER QL IF: NEGATIVE
B2 GLYCOPROT1 IGA SER-ACNC: 15 GPI IGA UNITS (ref 0–25)
B2 GLYCOPROT1 IGG SER-ACNC: <9 GPI IGG UNITS (ref 0–20)
B2 GLYCOPROT1 IGM SER-ACNC: <9 GPI IGM UNITS (ref 0–32)
CARDIOLIPIN IGG SER IA-ACNC: <9 GPL U/ML (ref 0–14)
CARDIOLIPIN IGM SER IA-ACNC: <9 MPL U/ML (ref 0–12)

## 2025-06-27 LAB
AT III ACT/NOR PPP CHRO: 150 % (ref 75–135)
PROT C AG ACT/NOR PPP IA: 103 % (ref 60–150)
PROT S ACT/NOR PPP: 120 % (ref 63–140)
PROT S AG ACT/NOR PPP IA: 124 % (ref 60–150)
PROT S FREE AG ACT/NOR PPP IA: 123 % (ref 61–136)

## 2025-06-28 LAB — PROTHROM ACT/NOR PPP: 132 % (ref 50–154)

## 2025-07-01 ENCOUNTER — TELEPHONE (OUTPATIENT)
Dept: ONCOLOGY | Facility: CLINIC | Age: 60
End: 2025-07-01
Payer: MEDICARE

## 2025-07-01 NOTE — TELEPHONE ENCOUNTER
Caller: Bill Umana    Relationship: Self    Best call back number: [phone]     What is the best time to reach you: ASAP    Who are you requesting to speak with (clinical staff, provider,  specific staff member): CLINICAL      What was the call regarding: PT RETURNING CALL TO [facility] FOR LAB RESULTS, HUB UNABLE TO WARM TRANSFER, PLEASE CALL PT BACK TO [facility].

## 2025-07-01 NOTE — TELEPHONE ENCOUNTER
RN left pt know all tests Dr Mercado performed were negative, per Dr Mercado's request. Pt verbalized understanding.

## 2025-08-11 RX ORDER — RAMIPRIL 2.5 MG/1
2.5 CAPSULE ORAL DAILY
Qty: 90 CAPSULE | Refills: 3 | OUTPATIENT
Start: 2025-08-11

## (undated) DEVICE — LIMB HOLDER, WRIST/ANKLE: Brand: DEROYAL

## (undated) DEVICE — ADULT NASAL CO2 SAMPLING WITH O2 DELIVERY CANNULA FOR CAPNOFLEX MODULE: Brand: VITAL SIGNS™

## (undated) DEVICE — GW TRNSEP SAFESEPT LT/ATRIAL RO 135CM .014IN

## (undated) DEVICE — LEX ELECTRO PHYSIOLOGY: Brand: MEDLINE INDUSTRIES, INC.

## (undated) DEVICE — SYS TRNSEP ACC ACROSS ADLT BRK1 71CM

## (undated) DEVICE — GW CORNRY QUIKCAS

## (undated) DEVICE — CATH DIAG EXPO .052 PIG145 6F 110CM

## (undated) DEVICE — Device: Brand: MEDEX

## (undated) DEVICE — KT MANIFLD EP

## (undated) DEVICE — DECANT BG O JET

## (undated) DEVICE — SET PRIMARY GRVTY 10DP MALE LL 104IN

## (undated) DEVICE — ST EXT IV SMARTSITE 2VLV SP M LL 5ML IV1

## (undated) DEVICE — HIGH RESOLUTION MAPPING CATHETER: Brand: INTELLAMAP ORION™

## (undated) DEVICE — TUBING, SUCTION, 1/4" X 10', STRAIGHT: Brand: MEDLINE

## (undated) DEVICE — MEDI-VAC YANKAUER SUCTION HANDLE W/BULBOUS TIP: Brand: CARDINAL HEALTH

## (undated) DEVICE — Device: Brand: NAVISTAR RMT

## (undated) DEVICE — INTRO SHEATH ART/FEM ENGAGE .038 6F12CM

## (undated) DEVICE — CONTRL CONTRST CHMBRD W/TBG72IN REUS

## (undated) DEVICE — MSK ENDO PORT O2 POM ELITE CURAPLEX A/

## (undated) DEVICE — SYS CLS VASC/VENI VASCADE MVP 6TO12F

## (undated) DEVICE — PLASMABLADE PS210-030S 3.0S LOCK: Brand: PLASMABLADE™

## (undated) DEVICE — CAP SYS PROC ACC WATCHMAN LAA

## (undated) DEVICE — CANN NASL CO2 DIVIDED A/

## (undated) DEVICE — CAUTERY TIP POLISHER: Brand: DEVON

## (undated) DEVICE — ACCESS SHEATH WITH DILATOR: Brand: WATCHMAN® ACCESS SYSTEM

## (undated) DEVICE — Device: Brand: SMARTABLATE

## (undated) DEVICE — DIAGNOSTIC ELECTRODE CATHETER: Brand: WOVEN

## (undated) DEVICE — DRSNG SURESITE123 4X4.8IN

## (undated) DEVICE — INTRO SWARTZ HEMO SL0 8.5F81CM

## (undated) DEVICE — ST INF PRI SMRTSTE 20DRP 2VLV 24ML 117

## (undated) DEVICE — SOL NACL 0.9PCT 1000ML

## (undated) DEVICE — PAD,NON-ADHERENT,3X8,STERILE,LF,1/PK: Brand: MEDLINE

## (undated) DEVICE — INTRO STEER AGILIS NXT MED/CURL 8.5F

## (undated) DEVICE — ELECTRD RETRN/GRND MEGADYNE SGL/PLT W/CORD 9FT DISP

## (undated) DEVICE — DOME MONITORING W BONDED STPCK BIOTRANS2

## (undated) DEVICE — ST EXT IV SMRTSTE 2VLV FIX M LL 6ML 41

## (undated) DEVICE — ST EXT IV LG BORE NDLESS FLTR LL 17IN

## (undated) DEVICE — KT DISP ARRHYTHMIA VMAP 91200008 DISP

## (undated) DEVICE — ADULT, W/LG. BACK PAD, RADIOTRANSPARENT ELEMENT AND LEAD WIRE COMPATIBLE W/: Brand: DEFIBRILLATION ELECTRODES

## (undated) DEVICE — CATH ULTRASND ECHOCARDIOGRAPHY ACUNAV

## (undated) DEVICE — INTRO SHEATH FAST/CATH LG/LUM 11F .038IN 12CM

## (undated) DEVICE — GW SAFARI2 PRESH XSM CRV .035IN 3.2X2.9X275CM

## (undated) DEVICE — INTRO SHEATH TRNSEP .032 SL0 8.5F 63CM

## (undated) DEVICE — Device: Brand: WEBSTER CS